# Patient Record
Sex: MALE | Race: WHITE | NOT HISPANIC OR LATINO | Employment: OTHER | URBAN - METROPOLITAN AREA
[De-identification: names, ages, dates, MRNs, and addresses within clinical notes are randomized per-mention and may not be internally consistent; named-entity substitution may affect disease eponyms.]

---

## 2018-05-29 ENCOUNTER — HOSPITAL ENCOUNTER (INPATIENT)
Facility: HOSPITAL | Age: 58
LOS: 16 days | Discharge: HOME HEALTH | End: 2018-06-15
Attending: EMERGENCY MEDICINE | Admitting: INTERNAL MEDICINE

## 2018-05-29 ENCOUNTER — APPOINTMENT (OUTPATIENT)
Dept: GENERAL RADIOLOGY | Facility: HOSPITAL | Age: 58
End: 2018-05-29

## 2018-05-29 DIAGNOSIS — J96.02 ACUTE RESPIRATORY FAILURE WITH HYPOXIA AND HYPERCAPNIA (HCC): ICD-10-CM

## 2018-05-29 DIAGNOSIS — J12.9 VIRAL PNEUMONIA: Primary | ICD-10-CM

## 2018-05-29 DIAGNOSIS — J96.01 ACUTE RESPIRATORY FAILURE WITH HYPOXIA AND HYPERCAPNIA (HCC): ICD-10-CM

## 2018-05-29 PROCEDURE — 85379 FIBRIN DEGRADATION QUANT: CPT | Performed by: INTERNAL MEDICINE

## 2018-05-29 PROCEDURE — 99285 EMERGENCY DEPT VISIT HI MDM: CPT

## 2018-05-29 PROCEDURE — 71045 X-RAY EXAM CHEST 1 VIEW: CPT

## 2018-05-29 PROCEDURE — 93005 ELECTROCARDIOGRAM TRACING: CPT | Performed by: EMERGENCY MEDICINE

## 2018-05-29 PROCEDURE — 84484 ASSAY OF TROPONIN QUANT: CPT | Performed by: EMERGENCY MEDICINE

## 2018-05-29 PROCEDURE — 83880 ASSAY OF NATRIURETIC PEPTIDE: CPT | Performed by: EMERGENCY MEDICINE

## 2018-05-29 PROCEDURE — 80053 COMPREHEN METABOLIC PANEL: CPT | Performed by: EMERGENCY MEDICINE

## 2018-05-29 PROCEDURE — 83605 ASSAY OF LACTIC ACID: CPT | Performed by: EMERGENCY MEDICINE

## 2018-05-29 PROCEDURE — 85025 COMPLETE CBC W/AUTO DIFF WBC: CPT | Performed by: EMERGENCY MEDICINE

## 2018-05-29 PROCEDURE — 93010 ELECTROCARDIOGRAM REPORT: CPT | Performed by: INTERNAL MEDICINE

## 2018-05-29 PROCEDURE — 87040 BLOOD CULTURE FOR BACTERIA: CPT | Performed by: EMERGENCY MEDICINE

## 2018-05-29 RX ORDER — CEFTRIAXONE SODIUM 1 G/50ML
1 INJECTION, SOLUTION INTRAVENOUS ONCE
Status: COMPLETED | OUTPATIENT
Start: 2018-05-29 | End: 2018-05-30

## 2018-05-29 RX ORDER — SODIUM CHLORIDE 0.9 % (FLUSH) 0.9 %
10 SYRINGE (ML) INJECTION AS NEEDED
Status: DISCONTINUED | OUTPATIENT
Start: 2018-05-29 | End: 2018-06-15 | Stop reason: HOSPADM

## 2018-05-30 ENCOUNTER — APPOINTMENT (OUTPATIENT)
Dept: CT IMAGING | Facility: HOSPITAL | Age: 58
End: 2018-05-30

## 2018-05-30 ENCOUNTER — APPOINTMENT (OUTPATIENT)
Dept: CARDIOLOGY | Facility: HOSPITAL | Age: 58
End: 2018-05-30
Attending: INTERNAL MEDICINE

## 2018-05-30 PROBLEM — J96.90 RESPIRATORY FAILURE (HCC): Status: ACTIVE | Noted: 2018-05-30

## 2018-05-30 LAB
ALBUMIN SERPL-MCNC: 3.2 G/DL (ref 3.5–5.2)
ALBUMIN SERPL-MCNC: 3.7 G/DL (ref 3.5–5.2)
ALBUMIN/GLOB SERPL: 1 G/DL
ALBUMIN/GLOB SERPL: 1.1 G/DL
ALP SERPL-CCNC: 50 U/L (ref 39–117)
ALP SERPL-CCNC: 58 U/L (ref 39–117)
ALT SERPL W P-5'-P-CCNC: 20 U/L (ref 1–41)
ALT SERPL W P-5'-P-CCNC: 26 U/L (ref 1–41)
ANION GAP SERPL CALCULATED.3IONS-SCNC: 8.6 MMOL/L
ANION GAP SERPL CALCULATED.3IONS-SCNC: 9.2 MMOL/L
ARTERIAL PATENCY WRIST A: POSITIVE
ARTERIAL PATENCY WRIST A: POSITIVE
AST SERPL-CCNC: 14 U/L (ref 1–40)
AST SERPL-CCNC: 19 U/L (ref 1–40)
ATMOSPHERIC PRESS: 744.6 MMHG
ATMOSPHERIC PRESS: 745.6 MMHG
B PERT DNA SPEC QL NAA+PROBE: NOT DETECTED
BASE EXCESS BLDA CALC-SCNC: 7.6 MMOL/L (ref 0–2)
BASE EXCESS BLDA CALC-SCNC: 8.2 MMOL/L (ref 0–2)
BASOPHILS # BLD AUTO: 0.01 10*3/MM3 (ref 0–0.2)
BASOPHILS # BLD AUTO: 0.02 10*3/MM3 (ref 0–0.2)
BASOPHILS NFR BLD AUTO: 0.2 % (ref 0–1.5)
BASOPHILS NFR BLD AUTO: 0.3 % (ref 0–1.5)
BDY SITE: ABNORMAL
BDY SITE: ABNORMAL
BH CV ECHO MEAS - ACS: 2.6 CM
BH CV ECHO MEAS - AO MEAN PG (FULL): 2 MMHG
BH CV ECHO MEAS - AO MEAN PG: 6 MMHG
BH CV ECHO MEAS - AO V2 MAX: 148 CM/SEC
BH CV ECHO MEAS - AO V2 MEAN: 113 CM/SEC
BH CV ECHO MEAS - AO V2 VTI: 25.4 CM
BH CV ECHO MEAS - AVA(I,A): 3.7 CM^2
BH CV ECHO MEAS - AVA(I,D): 3.7 CM^2
BH CV ECHO MEAS - BSA(HAYCOCK): 2.1 M^2
BH CV ECHO MEAS - BSA: 2.1 M^2
BH CV ECHO MEAS - BZI_BMI: 25 KILOGRAMS/M^2
BH CV ECHO MEAS - BZI_METRIC_HEIGHT: 182.9 CM
BH CV ECHO MEAS - BZI_METRIC_WEIGHT: 83.5 KG
BH CV ECHO MEAS - CONTRAST EF (2CH): 56.6 ML/M^2
BH CV ECHO MEAS - CONTRAST EF 4CH: 65.6 ML/M^2
BH CV ECHO MEAS - EDV(CUBED): 97.3 ML
BH CV ECHO MEAS - EDV(MOD-SP2): 122 ML
BH CV ECHO MEAS - EDV(MOD-SP4): 128 ML
BH CV ECHO MEAS - EDV(TEICH): 97.3 ML
BH CV ECHO MEAS - EF(CUBED): 74.9 %
BH CV ECHO MEAS - EF(MOD-BP): 66 %
BH CV ECHO MEAS - EF(MOD-SP2): 56.6 %
BH CV ECHO MEAS - EF(MOD-SP4): 65.6 %
BH CV ECHO MEAS - EF(TEICH): 66.9 %
BH CV ECHO MEAS - ESV(CUBED): 24.4 ML
BH CV ECHO MEAS - ESV(MOD-SP2): 53 ML
BH CV ECHO MEAS - ESV(MOD-SP4): 44 ML
BH CV ECHO MEAS - ESV(TEICH): 32.2 ML
BH CV ECHO MEAS - FS: 37 %
BH CV ECHO MEAS - IVS/LVPW: 1.1
BH CV ECHO MEAS - IVSD: 1.2 CM
BH CV ECHO MEAS - LA DIMENSION: 5 CM
BH CV ECHO MEAS - LAT PEAK E' VEL: 5 CM/SEC
BH CV ECHO MEAS - LV DIASTOLIC VOL/BSA (35-75): 62.3 ML/M^2
BH CV ECHO MEAS - LV MASS(C)D: 192.9 GRAMS
BH CV ECHO MEAS - LV MASS(C)DI: 93.8 GRAMS/M^2
BH CV ECHO MEAS - LV MEAN PG: 4 MMHG
BH CV ECHO MEAS - LV SYSTOLIC VOL/BSA (12-30): 21.4 ML/M^2
BH CV ECHO MEAS - LV V1 MAX: 132 CM/SEC
BH CV ECHO MEAS - LV V1 MEAN: 85.4 CM/SEC
BH CV ECHO MEAS - LV V1 VTI: 24.9 CM
BH CV ECHO MEAS - LVIDD: 4.6 CM
BH CV ECHO MEAS - LVIDS: 2.9 CM
BH CV ECHO MEAS - LVLD AP2: 8.3 CM
BH CV ECHO MEAS - LVLD AP4: 8 CM
BH CV ECHO MEAS - LVLS AP2: 8.3 CM
BH CV ECHO MEAS - LVLS AP4: 6.8 CM
BH CV ECHO MEAS - LVOT AREA (M): 3.8 CM^2
BH CV ECHO MEAS - LVOT AREA: 3.8 CM^2
BH CV ECHO MEAS - LVOT DIAM: 2.2 CM
BH CV ECHO MEAS - LVPWD: 1.1 CM
BH CV ECHO MEAS - MED PEAK E' VEL: 5 CM/SEC
BH CV ECHO MEAS - MV A DUR: 0.12 SEC
BH CV ECHO MEAS - MV A MAX VEL: 68.1 CM/SEC
BH CV ECHO MEAS - MV DEC SLOPE: 235 CM/SEC^2
BH CV ECHO MEAS - MV DEC TIME: 0.18 SEC
BH CV ECHO MEAS - MV E MAX VEL: 57.8 CM/SEC
BH CV ECHO MEAS - MV E/A: 0.85
BH CV ECHO MEAS - MV MEAN PG: 1 MMHG
BH CV ECHO MEAS - MV P1/2T MAX VEL: 61.1 CM/SEC
BH CV ECHO MEAS - MV P1/2T: 76.2 MSEC
BH CV ECHO MEAS - MV V2 MEAN: 40.8 CM/SEC
BH CV ECHO MEAS - MV V2 VTI: 22.3 CM
BH CV ECHO MEAS - MVA P1/2T LCG: 3.6 CM^2
BH CV ECHO MEAS - MVA(P1/2T): 2.9 CM^2
BH CV ECHO MEAS - MVA(VTI): 4.2 CM^2
BH CV ECHO MEAS - PA ACC SLOPE: 0 CM/SEC^2
BH CV ECHO MEAS - PA ACC TIME: 0.06 SEC
BH CV ECHO MEAS - PA MAX PG (FULL): 2.8 MMHG
BH CV ECHO MEAS - PA MAX PG: 4.3 MMHG
BH CV ECHO MEAS - PA PR(ACCEL): 50.7 MMHG
BH CV ECHO MEAS - PA V2 MAX: 104 CM/SEC
BH CV ECHO MEAS - PULM A REVS DUR: 0.15 SEC
BH CV ECHO MEAS - PULM A REVS VEL: 55 CM/SEC
BH CV ECHO MEAS - PULM DIAS VEL: 42.9 CM/SEC
BH CV ECHO MEAS - PULM S/D: 1
BH CV ECHO MEAS - PULM SYS VEL: 43.3 CM/SEC
BH CV ECHO MEAS - PVA(V,A): 2.5 CM^2
BH CV ECHO MEAS - PVA(V,D): 2.5 CM^2
BH CV ECHO MEAS - QP/QS: 0.55
BH CV ECHO MEAS - RAP SYSTOLE: 8 MMHG
BH CV ECHO MEAS - RV MAX PG: 1.6 MMHG
BH CV ECHO MEAS - RV MEAN PG: 1 MMHG
BH CV ECHO MEAS - RV V1 MAX: 62.4 CM/SEC
BH CV ECHO MEAS - RV V1 MEAN: 40.9 CM/SEC
BH CV ECHO MEAS - RV V1 VTI: 12.5 CM
BH CV ECHO MEAS - RVOT AREA: 4.2 CM^2
BH CV ECHO MEAS - RVOT DIAM: 2.3 CM
BH CV ECHO MEAS - RVSP: 15 MMHG
BH CV ECHO MEAS - SI(CUBED): 35.5 ML/M^2
BH CV ECHO MEAS - SI(LVOT): 46 ML/M^2
BH CV ECHO MEAS - SI(MOD-SP2): 33.6 ML/M^2
BH CV ECHO MEAS - SI(MOD-SP4): 40.9 ML/M^2
BH CV ECHO MEAS - SI(TEICH): 31.7 ML/M^2
BH CV ECHO MEAS - SV(CUBED): 72.9 ML
BH CV ECHO MEAS - SV(LVOT): 94.7 ML
BH CV ECHO MEAS - SV(MOD-SP2): 69 ML
BH CV ECHO MEAS - SV(MOD-SP4): 84 ML
BH CV ECHO MEAS - SV(RVOT): 51.9 ML
BH CV ECHO MEAS - SV(TEICH): 65.1 ML
BH CV ECHO MEAS - TAPSE (>1.6): 1.8 CM2
BH CV ECHO MEAS - TR MAX VEL: 132 CM/SEC
BH CV ECHO MEASUREMENTS AVERAGE E/E' RATIO: 11.56
BH CV XLRA - RV BASE: 3.6 CM
BH CV XLRA - TDI S': 14 CM/SEC
BILIRUB SERPL-MCNC: 0.2 MG/DL (ref 0.1–1.2)
BILIRUB SERPL-MCNC: 0.3 MG/DL (ref 0.1–1.2)
BUN BLD-MCNC: 16 MG/DL (ref 6–20)
BUN BLD-MCNC: 17 MG/DL (ref 6–20)
BUN/CREAT SERPL: 33.3 (ref 7–25)
BUN/CREAT SERPL: 36.2 (ref 7–25)
C PNEUM DNA NPH QL NAA+NON-PROBE: NOT DETECTED
CALCIUM SPEC-SCNC: 7.9 MG/DL (ref 8.6–10.5)
CALCIUM SPEC-SCNC: 8.7 MG/DL (ref 8.6–10.5)
CHLORIDE SERPL-SCNC: 101 MMOL/L (ref 98–107)
CHLORIDE SERPL-SCNC: 97 MMOL/L (ref 98–107)
CO2 SERPL-SCNC: 31.4 MMOL/L (ref 22–29)
CO2 SERPL-SCNC: 32.8 MMOL/L (ref 22–29)
CREAT BLD-MCNC: 0.47 MG/DL (ref 0.76–1.27)
CREAT BLD-MCNC: 0.48 MG/DL (ref 0.76–1.27)
D DIMER PPP FEU-MCNC: 0.31 MCGFEU/ML (ref 0–0.49)
D-LACTATE SERPL-SCNC: 1.8 MMOL/L (ref 0.5–2)
DEPRECATED RDW RBC AUTO: 53.3 FL (ref 37–54)
DEPRECATED RDW RBC AUTO: 53.9 FL (ref 37–54)
EOSINOPHIL # BLD AUTO: 0.03 10*3/MM3 (ref 0–0.7)
EOSINOPHIL # BLD AUTO: 0.04 10*3/MM3 (ref 0–0.7)
EOSINOPHIL NFR BLD AUTO: 0.5 % (ref 0.3–6.2)
EOSINOPHIL NFR BLD AUTO: 0.7 % (ref 0.3–6.2)
ERYTHROCYTE [DISTWIDTH] IN BLOOD BY AUTOMATED COUNT: 14.3 % (ref 11.5–14.5)
ERYTHROCYTE [DISTWIDTH] IN BLOOD BY AUTOMATED COUNT: 14.5 % (ref 11.5–14.5)
FLUAV H1 2009 PAND RNA NPH QL NAA+PROBE: NOT DETECTED
FLUAV H1 HA GENE NPH QL NAA+PROBE: NOT DETECTED
FLUAV H3 RNA NPH QL NAA+PROBE: NOT DETECTED
FLUAV SUBTYP SPEC NAA+PROBE: NOT DETECTED
FLUBV RNA ISLT QL NAA+PROBE: NOT DETECTED
FOLATE SERPL-MCNC: 19.2 NG/ML (ref 4.78–24.2)
GFR SERPL CREATININE-BSD FRML MDRD: >150 ML/MIN/1.73
GFR SERPL CREATININE-BSD FRML MDRD: >150 ML/MIN/1.73
GLOBULIN UR ELPH-MCNC: 3.2 GM/DL
GLOBULIN UR ELPH-MCNC: 3.4 GM/DL
GLUCOSE BLD-MCNC: 112 MG/DL (ref 65–99)
GLUCOSE BLD-MCNC: 196 MG/DL (ref 65–99)
GLUCOSE BLDC GLUCOMTR-MCNC: 106 MG/DL (ref 70–130)
GLUCOSE BLDC GLUCOMTR-MCNC: 109 MG/DL (ref 70–130)
GLUCOSE BLDC GLUCOMTR-MCNC: 110 MG/DL (ref 70–130)
GLUCOSE BLDC GLUCOMTR-MCNC: 117 MG/DL (ref 70–130)
GLUCOSE BLDC GLUCOMTR-MCNC: 136 MG/DL (ref 70–130)
HADV DNA SPEC NAA+PROBE: NOT DETECTED
HBA1C MFR BLD: 5.6 % (ref 4.8–5.6)
HCO3 BLDA-SCNC: 35.8 MMOL/L (ref 22–28)
HCO3 BLDA-SCNC: 36.5 MMOL/L (ref 22–28)
HCOV 229E RNA SPEC QL NAA+PROBE: NOT DETECTED
HCOV HKU1 RNA SPEC QL NAA+PROBE: NOT DETECTED
HCOV NL63 RNA SPEC QL NAA+PROBE: NOT DETECTED
HCOV OC43 RNA SPEC QL NAA+PROBE: NOT DETECTED
HCT VFR BLD AUTO: 41.2 % (ref 40.4–52.2)
HCT VFR BLD AUTO: 45.9 % (ref 40.4–52.2)
HGB BLD-MCNC: 12.7 G/DL (ref 13.7–17.6)
HGB BLD-MCNC: 14.8 G/DL (ref 13.7–17.6)
HMPV RNA NPH QL NAA+NON-PROBE: NOT DETECTED
HOLD SPECIMEN: NORMAL
HOLD SPECIMEN: NORMAL
HOROWITZ INDEX BLD+IHG-RTO: 100 %
HPIV1 RNA SPEC QL NAA+PROBE: NOT DETECTED
HPIV2 RNA SPEC QL NAA+PROBE: NOT DETECTED
HPIV3 RNA NPH QL NAA+PROBE: DETECTED
HPIV4 P GENE NPH QL NAA+PROBE: NOT DETECTED
IMM GRANULOCYTES # BLD: 0.02 10*3/MM3 (ref 0–0.03)
IMM GRANULOCYTES # BLD: 0.03 10*3/MM3 (ref 0–0.03)
IMM GRANULOCYTES NFR BLD: 0.3 % (ref 0–0.5)
IMM GRANULOCYTES NFR BLD: 0.5 % (ref 0–0.5)
LEFT ATRIUM VOLUME INDEX: 13 ML/M2
LEFT ATRIUM VOLUME: 26 CM3
LYMPHOCYTES # BLD AUTO: 0.93 10*3/MM3 (ref 0.9–4.8)
LYMPHOCYTES # BLD AUTO: 1.07 10*3/MM3 (ref 0.9–4.8)
LYMPHOCYTES NFR BLD AUTO: 15.6 % (ref 19.6–45.3)
LYMPHOCYTES NFR BLD AUTO: 17.8 % (ref 19.6–45.3)
M PNEUMO IGG SER IA-ACNC: NOT DETECTED
MAXIMAL PREDICTED HEART RATE: 161 BPM
MCH RBC QN AUTO: 31.4 PG (ref 27–32.7)
MCH RBC QN AUTO: 32.8 PG (ref 27–32.7)
MCHC RBC AUTO-ENTMCNC: 30.8 G/DL (ref 32.6–36.4)
MCHC RBC AUTO-ENTMCNC: 32.2 G/DL (ref 32.6–36.4)
MCV RBC AUTO: 101.8 FL (ref 79.8–96.2)
MCV RBC AUTO: 102 FL (ref 79.8–96.2)
MODALITY: ABNORMAL
MODALITY: ABNORMAL
MONOCYTES # BLD AUTO: 0.62 10*3/MM3 (ref 0.2–1.2)
MONOCYTES # BLD AUTO: 0.73 10*3/MM3 (ref 0.2–1.2)
MONOCYTES NFR BLD AUTO: 10.3 % (ref 5–12)
MONOCYTES NFR BLD AUTO: 12.2 % (ref 5–12)
NEUTROPHILS # BLD AUTO: 4.22 10*3/MM3 (ref 1.9–8.1)
NEUTROPHILS # BLD AUTO: 4.26 10*3/MM3 (ref 1.9–8.1)
NEUTROPHILS NFR BLD AUTO: 70.8 % (ref 42.7–76)
NEUTROPHILS NFR BLD AUTO: 70.8 % (ref 42.7–76)
NT-PROBNP SERPL-MCNC: 182.9 PG/ML (ref 0–900)
O2 A-A PPRESDIFF RESPIRATORY: 0.1 MMHG
PCO2 BLDA: 64.2 MM HG (ref 35–45)
PCO2 BLDA: 67.2 MM HG (ref 35–45)
PH BLDA: 7.34 PH UNITS (ref 7.35–7.45)
PH BLDA: 7.36 PH UNITS (ref 7.35–7.45)
PLATELET # BLD AUTO: 120 10*3/MM3 (ref 140–500)
PLATELET # BLD AUTO: 127 10*3/MM3 (ref 140–500)
PMV BLD AUTO: 9.6 FL (ref 6–12)
PMV BLD AUTO: 9.6 FL (ref 6–12)
PO2 BLDA: 67.8 MM HG (ref 80–100)
PO2 BLDA: 89.6 MM HG (ref 80–100)
POTASSIUM BLD-SCNC: 4.1 MMOL/L (ref 3.5–5.2)
POTASSIUM BLD-SCNC: 4.4 MMOL/L (ref 3.5–5.2)
PROCALCITONIN SERPL-MCNC: 0.06 NG/ML (ref 0.1–0.25)
PROT SERPL-MCNC: 6.4 G/DL (ref 6–8.5)
PROT SERPL-MCNC: 7.1 G/DL (ref 6–8.5)
RBC # BLD AUTO: 4.04 10*6/MM3 (ref 4.6–6)
RBC # BLD AUTO: 4.51 10*6/MM3 (ref 4.6–6)
RHINOVIRUS RNA SPEC NAA+PROBE: NOT DETECTED
RSV RNA NPH QL NAA+NON-PROBE: NOT DETECTED
SAO2 % BLDCOA: 91.2 % (ref 92–99)
SAO2 % BLDCOA: 96.1 % (ref 92–99)
SET MECH RESP RATE: 24
SODIUM BLD-SCNC: 139 MMOL/L (ref 136–145)
SODIUM BLD-SCNC: 141 MMOL/L (ref 136–145)
STRESS TARGET HR: 137 BPM
TOTAL RATE: 20 BREATHS/MINUTE
TROPONIN T SERPL-MCNC: <0.01 NG/ML (ref 0–0.03)
VIT B12 BLD-MCNC: 641 PG/ML (ref 211–946)
WBC NRBC COR # BLD: 5.96 10*3/MM3 (ref 4.5–10.7)
WBC NRBC COR # BLD: 6.02 10*3/MM3 (ref 4.5–10.7)
WHOLE BLOOD HOLD SPECIMEN: NORMAL
WHOLE BLOOD HOLD SPECIMEN: NORMAL

## 2018-05-30 PROCEDURE — 93306 TTE W/DOPPLER COMPLETE: CPT | Performed by: INTERNAL MEDICINE

## 2018-05-30 PROCEDURE — 94799 UNLISTED PULMONARY SVC/PX: CPT

## 2018-05-30 PROCEDURE — 87798 DETECT AGENT NOS DNA AMP: CPT | Performed by: EMERGENCY MEDICINE

## 2018-05-30 PROCEDURE — 82962 GLUCOSE BLOOD TEST: CPT

## 2018-05-30 PROCEDURE — 87486 CHLMYD PNEUM DNA AMP PROBE: CPT | Performed by: EMERGENCY MEDICINE

## 2018-05-30 PROCEDURE — 36600 WITHDRAWAL OF ARTERIAL BLOOD: CPT

## 2018-05-30 PROCEDURE — 70450 CT HEAD/BRAIN W/O DYE: CPT

## 2018-05-30 PROCEDURE — 25010000002 CEFTRIAXONE PER 250 MG: Performed by: EMERGENCY MEDICINE

## 2018-05-30 PROCEDURE — 87633 RESP VIRUS 12-25 TARGETS: CPT | Performed by: EMERGENCY MEDICINE

## 2018-05-30 PROCEDURE — 83036 HEMOGLOBIN GLYCOSYLATED A1C: CPT | Performed by: INTERNAL MEDICINE

## 2018-05-30 PROCEDURE — 85025 COMPLETE CBC W/AUTO DIFF WBC: CPT | Performed by: INTERNAL MEDICINE

## 2018-05-30 PROCEDURE — 82803 BLOOD GASES ANY COMBINATION: CPT

## 2018-05-30 PROCEDURE — 87081 CULTURE SCREEN ONLY: CPT | Performed by: INTERNAL MEDICINE

## 2018-05-30 PROCEDURE — 87581 M.PNEUMON DNA AMP PROBE: CPT | Performed by: EMERGENCY MEDICINE

## 2018-05-30 PROCEDURE — 25010000002 AZITHROMYCIN PER 500 MG: Performed by: EMERGENCY MEDICINE

## 2018-05-30 PROCEDURE — 80053 COMPREHEN METABOLIC PANEL: CPT | Performed by: INTERNAL MEDICINE

## 2018-05-30 PROCEDURE — 94668 MNPJ CHEST WALL SBSQ: CPT

## 2018-05-30 PROCEDURE — 25010000002 ENOXAPARIN PER 10 MG: Performed by: INTERNAL MEDICINE

## 2018-05-30 PROCEDURE — 94667 MNPJ CHEST WALL 1ST: CPT

## 2018-05-30 PROCEDURE — 94640 AIRWAY INHALATION TREATMENT: CPT

## 2018-05-30 PROCEDURE — 99222 1ST HOSP IP/OBS MODERATE 55: CPT | Performed by: PSYCHIATRY & NEUROLOGY

## 2018-05-30 PROCEDURE — 93010 ELECTROCARDIOGRAM REPORT: CPT | Performed by: INTERNAL MEDICINE

## 2018-05-30 PROCEDURE — 93306 TTE W/DOPPLER COMPLETE: CPT

## 2018-05-30 PROCEDURE — 87205 SMEAR GRAM STAIN: CPT | Performed by: INTERNAL MEDICINE

## 2018-05-30 PROCEDURE — 25010000002 FUROSEMIDE PER 20 MG: Performed by: INTERNAL MEDICINE

## 2018-05-30 PROCEDURE — 84145 PROCALCITONIN (PCT): CPT | Performed by: INTERNAL MEDICINE

## 2018-05-30 PROCEDURE — 87070 CULTURE OTHR SPECIMN AEROBIC: CPT | Performed by: INTERNAL MEDICINE

## 2018-05-30 PROCEDURE — 93005 ELECTROCARDIOGRAM TRACING: CPT | Performed by: INTERNAL MEDICINE

## 2018-05-30 PROCEDURE — 71250 CT THORAX DX C-: CPT

## 2018-05-30 PROCEDURE — 82607 VITAMIN B-12: CPT | Performed by: INTERNAL MEDICINE

## 2018-05-30 PROCEDURE — 82746 ASSAY OF FOLIC ACID SERUM: CPT | Performed by: INTERNAL MEDICINE

## 2018-05-30 RX ORDER — SODIUM CHLORIDE 0.9 % (FLUSH) 0.9 %
1-10 SYRINGE (ML) INJECTION AS NEEDED
Status: DISCONTINUED | OUTPATIENT
Start: 2018-05-30 | End: 2018-06-15 | Stop reason: HOSPADM

## 2018-05-30 RX ORDER — ACETAMINOPHEN 325 MG/1
650 TABLET ORAL EVERY 4 HOURS PRN
Status: DISCONTINUED | OUTPATIENT
Start: 2018-05-30 | End: 2018-06-15 | Stop reason: HOSPADM

## 2018-05-30 RX ORDER — CARBAMAZEPINE 100 MG/5ML
100 SUSPENSION ORAL 2 TIMES DAILY
Status: DISCONTINUED | OUTPATIENT
Start: 2018-05-30 | End: 2018-06-15 | Stop reason: HOSPADM

## 2018-05-30 RX ORDER — DIGOXIN 125 MCG
125 TABLET ORAL
COMMUNITY

## 2018-05-30 RX ORDER — POTASSIUM CHLORIDE 750 MG/1
40 CAPSULE, EXTENDED RELEASE ORAL AS NEEDED
Status: DISCONTINUED | OUTPATIENT
Start: 2018-05-30 | End: 2018-06-15 | Stop reason: HOSPADM

## 2018-05-30 RX ORDER — LANSOPRAZOLE 30 MG/1
30 CAPSULE, DELAYED RELEASE ORAL DAILY
COMMUNITY
End: 2018-06-15 | Stop reason: HOSPADM

## 2018-05-30 RX ORDER — CETIRIZINE HYDROCHLORIDE 10 MG/1
10 TABLET ORAL DAILY
COMMUNITY
End: 2018-06-15 | Stop reason: HOSPADM

## 2018-05-30 RX ORDER — SODIUM CHLORIDE FOR INHALATION 7 %
4 VIAL, NEBULIZER (ML) INHALATION
Status: DISCONTINUED | OUTPATIENT
Start: 2018-05-30 | End: 2018-06-01

## 2018-05-30 RX ORDER — ARIPIPRAZOLE 10 MG/1
20 TABLET ORAL DAILY
Status: DISCONTINUED | OUTPATIENT
Start: 2018-05-30 | End: 2018-05-30

## 2018-05-30 RX ORDER — BACLOFEN 10 MG/1
20 TABLET ORAL 3 TIMES DAILY
Status: DISCONTINUED | OUTPATIENT
Start: 2018-05-30 | End: 2018-05-30

## 2018-05-30 RX ORDER — POTASSIUM CHLORIDE 1.5 G/1.77G
40 POWDER, FOR SOLUTION ORAL AS NEEDED
Status: DISCONTINUED | OUTPATIENT
Start: 2018-05-30 | End: 2018-06-15 | Stop reason: HOSPADM

## 2018-05-30 RX ORDER — ACETAMINOPHEN 650 MG/1
650 SUPPOSITORY RECTAL EVERY 4 HOURS PRN
Status: DISCONTINUED | OUTPATIENT
Start: 2018-05-30 | End: 2018-06-15 | Stop reason: HOSPADM

## 2018-05-30 RX ORDER — DEXTROSE MONOHYDRATE 25 G/50ML
25 INJECTION, SOLUTION INTRAVENOUS
Status: DISCONTINUED | OUTPATIENT
Start: 2018-05-30 | End: 2018-06-15 | Stop reason: HOSPADM

## 2018-05-30 RX ORDER — PANTOPRAZOLE SODIUM 40 MG/1
40 TABLET, DELAYED RELEASE ORAL EVERY MORNING
Status: DISCONTINUED | OUTPATIENT
Start: 2018-05-30 | End: 2018-05-30

## 2018-05-30 RX ORDER — CARBAMAZEPINE 100 MG/5ML
SUSPENSION ORAL 2 TIMES DAILY
COMMUNITY

## 2018-05-30 RX ORDER — FUROSEMIDE 10 MG/ML
40 INJECTION INTRAMUSCULAR; INTRAVENOUS ONCE
Status: COMPLETED | OUTPATIENT
Start: 2018-05-30 | End: 2018-05-30

## 2018-05-30 RX ORDER — BUMETANIDE 0.5 MG/1
0.5 TABLET ORAL DAILY
COMMUNITY
End: 2018-06-15 | Stop reason: HOSPADM

## 2018-05-30 RX ORDER — LEVOTHYROXINE SODIUM 0.05 MG/1
50 TABLET ORAL DAILY
Status: DISCONTINUED | OUTPATIENT
Start: 2018-05-30 | End: 2018-06-15 | Stop reason: HOSPADM

## 2018-05-30 RX ORDER — CETIRIZINE HYDROCHLORIDE 10 MG/1
10 TABLET ORAL DAILY
Status: DISCONTINUED | OUTPATIENT
Start: 2018-05-30 | End: 2018-06-10

## 2018-05-30 RX ORDER — POTASSIUM CHLORIDE 7.45 MG/ML
10 INJECTION INTRAVENOUS
Status: DISCONTINUED | OUTPATIENT
Start: 2018-05-30 | End: 2018-06-15 | Stop reason: HOSPADM

## 2018-05-30 RX ORDER — IPRATROPIUM BROMIDE AND ALBUTEROL SULFATE 2.5; .5 MG/3ML; MG/3ML
3 SOLUTION RESPIRATORY (INHALATION)
Status: DISCONTINUED | OUTPATIENT
Start: 2018-05-30 | End: 2018-05-30

## 2018-05-30 RX ORDER — DIGOXIN 125 MCG
125 TABLET ORAL
Status: DISCONTINUED | OUTPATIENT
Start: 2018-05-30 | End: 2018-06-11

## 2018-05-30 RX ORDER — IPRATROPIUM BROMIDE 21 UG/1
2 SPRAY, METERED NASAL EVERY 12 HOURS
Status: DISCONTINUED | OUTPATIENT
Start: 2018-05-30 | End: 2018-05-30

## 2018-05-30 RX ORDER — ASPIRIN 325 MG
325 TABLET ORAL DAILY
Status: DISCONTINUED | OUTPATIENT
Start: 2018-05-30 | End: 2018-06-15 | Stop reason: HOSPADM

## 2018-05-30 RX ORDER — LEVETIRACETAM 750 MG/1
750 TABLET ORAL 2 TIMES DAILY
COMMUNITY

## 2018-05-30 RX ORDER — SENNA AND DOCUSATE SODIUM 50; 8.6 MG/1; MG/1
2 TABLET, FILM COATED ORAL NIGHTLY
Status: DISCONTINUED | OUTPATIENT
Start: 2018-05-30 | End: 2018-06-02

## 2018-05-30 RX ORDER — SCOLOPAMINE TRANSDERMAL SYSTEM 1 MG/1
1 PATCH, EXTENDED RELEASE TRANSDERMAL
Status: DISCONTINUED | OUTPATIENT
Start: 2018-05-30 | End: 2018-06-02

## 2018-05-30 RX ORDER — SODIUM CHLORIDE, SODIUM LACTATE, POTASSIUM CHLORIDE, CALCIUM CHLORIDE 600; 310; 30; 20 MG/100ML; MG/100ML; MG/100ML; MG/100ML
100 INJECTION, SOLUTION INTRAVENOUS CONTINUOUS
Status: DISCONTINUED | OUTPATIENT
Start: 2018-05-30 | End: 2018-05-30

## 2018-05-30 RX ORDER — FAMOTIDINE 10 MG/ML
20 INJECTION, SOLUTION INTRAVENOUS EVERY 12 HOURS SCHEDULED
Status: DISCONTINUED | OUTPATIENT
Start: 2018-05-30 | End: 2018-05-30 | Stop reason: ALTCHOICE

## 2018-05-30 RX ORDER — NICOTINE POLACRILEX 4 MG
15 LOZENGE BUCCAL
Status: DISCONTINUED | OUTPATIENT
Start: 2018-05-30 | End: 2018-06-15 | Stop reason: HOSPADM

## 2018-05-30 RX ORDER — ARFORMOTEROL TARTRATE 15 UG/2ML
15 SOLUTION RESPIRATORY (INHALATION)
Status: DISCONTINUED | OUTPATIENT
Start: 2018-05-30 | End: 2018-06-01

## 2018-05-30 RX ORDER — LEVOTHYROXINE SODIUM 0.05 MG/1
50 TABLET ORAL DAILY
COMMUNITY

## 2018-05-30 RX ORDER — BACLOFEN 10 MG/1
10 TABLET ORAL 3 TIMES DAILY
Status: DISCONTINUED | OUTPATIENT
Start: 2018-05-30 | End: 2018-06-10

## 2018-05-30 RX ORDER — IPRATROPIUM BROMIDE AND ALBUTEROL SULFATE 2.5; .5 MG/3ML; MG/3ML
3 SOLUTION RESPIRATORY (INHALATION)
Status: DISCONTINUED | OUTPATIENT
Start: 2018-05-30 | End: 2018-06-07

## 2018-05-30 RX ORDER — ARIPIPRAZOLE 20 MG/1
20 TABLET ORAL DAILY
COMMUNITY

## 2018-05-30 RX ORDER — BACLOFEN 20 MG/1
20 TABLET ORAL 3 TIMES DAILY
Status: ON HOLD | COMMUNITY
End: 2018-06-15

## 2018-05-30 RX ORDER — LANSOPRAZOLE
30 KIT EVERY MORNING
Status: DISCONTINUED | OUTPATIENT
Start: 2018-05-31 | End: 2018-06-10

## 2018-05-30 RX ORDER — ASPIRIN 325 MG
325 TABLET ORAL DAILY
COMMUNITY

## 2018-05-30 RX ADMIN — IPRATROPIUM BROMIDE 2 SPRAY: 21 SPRAY NASAL at 04:19

## 2018-05-30 RX ADMIN — LEVETIRACETAM 750 MG: 500 TABLET, FILM COATED ORAL at 07:36

## 2018-05-30 RX ADMIN — BACLOFEN 10 MG: 10 TABLET ORAL at 20:08

## 2018-05-30 RX ADMIN — SODIUM CHLORIDE, POTASSIUM CHLORIDE, SODIUM LACTATE AND CALCIUM CHLORIDE 100 ML/HR: 600; 310; 30; 20 INJECTION, SOLUTION INTRAVENOUS at 03:36

## 2018-05-30 RX ADMIN — ARFORMOTEROL TARTRATE 15 MCG: 15 SOLUTION RESPIRATORY (INHALATION) at 15:18

## 2018-05-30 RX ADMIN — CETIRIZINE HYDROCHLORIDE 10 MG: 10 TABLET, FILM COATED ORAL at 08:39

## 2018-05-30 RX ADMIN — ENOXAPARIN SODIUM 40 MG: 100 INJECTION SUBCUTANEOUS at 06:24

## 2018-05-30 RX ADMIN — BACLOFEN 20 MG: 10 TABLET ORAL at 08:39

## 2018-05-30 RX ADMIN — CEFTRIAXONE SODIUM 1 G: 1 INJECTION, SOLUTION INTRAVENOUS at 00:00

## 2018-05-30 RX ADMIN — SODIUM CHLORIDE 4 ML: 7 NEBU SOLN,3 % NEBU at 15:18

## 2018-05-30 RX ADMIN — FAMOTIDINE 20 MG: 10 INJECTION INTRAVENOUS at 08:41

## 2018-05-30 RX ADMIN — ARIPIPRAZOLE 20 MG: 10 TABLET ORAL at 08:39

## 2018-05-30 RX ADMIN — IPRATROPIUM BROMIDE AND ALBUTEROL SULFATE 3 ML: .5; 3 SOLUTION RESPIRATORY (INHALATION) at 06:48

## 2018-05-30 RX ADMIN — DILTIAZEM HYDROCHLORIDE 30 MG: 30 TABLET, FILM COATED ORAL at 20:07

## 2018-05-30 RX ADMIN — LEVOTHYROXINE SODIUM 50 MCG: 50 TABLET ORAL at 08:39

## 2018-05-30 RX ADMIN — DILTIAZEM HYDROCHLORIDE 30 MG: 30 TABLET, FILM COATED ORAL at 08:39

## 2018-05-30 RX ADMIN — DIGOXIN 125 MCG: 0.12 TABLET ORAL at 13:49

## 2018-05-30 RX ADMIN — CARBAMAZEPINE 100 MG: 100 SUSPENSION ORAL at 20:08

## 2018-05-30 RX ADMIN — SCOPOLAMINE 1 PATCH: 1 PATCH, EXTENDED RELEASE TRANSDERMAL at 17:16

## 2018-05-30 RX ADMIN — FUROSEMIDE 40 MG: 10 INJECTION, SOLUTION INTRAMUSCULAR; INTRAVENOUS at 16:09

## 2018-05-30 RX ADMIN — IPRATROPIUM BROMIDE AND ALBUTEROL SULFATE 3 ML: .5; 3 SOLUTION RESPIRATORY (INHALATION) at 20:21

## 2018-05-30 RX ADMIN — BACLOFEN 10 MG: 10 TABLET ORAL at 16:07

## 2018-05-30 RX ADMIN — ASPIRIN 325 MG: 325 TABLET ORAL at 08:39

## 2018-05-30 RX ADMIN — AZITHROMYCIN MONOHYDRATE 500 MG: 500 INJECTION, POWDER, LYOPHILIZED, FOR SOLUTION INTRAVENOUS at 00:31

## 2018-05-30 RX ADMIN — DOCUSATE SODIUM -SENNOSIDES 2 TABLET: 50; 8.6 TABLET, COATED ORAL at 07:36

## 2018-05-30 RX ADMIN — LEVETIRACETAM 750 MG: 500 TABLET, FILM COATED ORAL at 17:17

## 2018-05-30 RX ADMIN — CARBAMAZEPINE 100 MG: 100 SUSPENSION ORAL at 08:39

## 2018-05-30 RX ADMIN — SODIUM CHLORIDE 1000 ML: 9 INJECTION, SOLUTION INTRAVENOUS at 00:00

## 2018-05-30 RX ADMIN — DOCUSATE SODIUM -SENNOSIDES 2 TABLET: 50; 8.6 TABLET, COATED ORAL at 20:08

## 2018-05-30 RX ADMIN — DILTIAZEM HYDROCHLORIDE 30 MG: 30 TABLET, FILM COATED ORAL at 16:08

## 2018-05-31 PROBLEM — J96.01 ACUTE RESPIRATORY FAILURE WITH HYPOXIA AND HYPERCAPNIA (HCC): Status: ACTIVE | Noted: 2018-05-29

## 2018-05-31 PROBLEM — J96.02 ACUTE RESPIRATORY FAILURE WITH HYPOXIA AND HYPERCAPNIA (HCC): Status: ACTIVE | Noted: 2018-05-29

## 2018-05-31 LAB
ANION GAP SERPL CALCULATED.3IONS-SCNC: 9.3 MMOL/L
ARTERIAL PATENCY WRIST A: ABNORMAL
ATMOSPHERIC PRESS: 748.1 MMHG
BASE EXCESS BLDA CALC-SCNC: 8.3 MMOL/L (ref 0–2)
BASOPHILS # BLD AUTO: 0.02 10*3/MM3 (ref 0–0.2)
BASOPHILS NFR BLD AUTO: 0.3 % (ref 0–1.5)
BDY SITE: ABNORMAL
BUN BLD-MCNC: 19 MG/DL (ref 6–20)
BUN/CREAT SERPL: 46.3 (ref 7–25)
CALCIUM SPEC-SCNC: 7.9 MG/DL (ref 8.6–10.5)
CHLORIDE SERPL-SCNC: 97 MMOL/L (ref 98–107)
CO2 SERPL-SCNC: 34.7 MMOL/L (ref 22–29)
CREAT BLD-MCNC: 0.41 MG/DL (ref 0.76–1.27)
DEPRECATED RDW RBC AUTO: 49.4 FL (ref 37–54)
EOSINOPHIL # BLD AUTO: 0.01 10*3/MM3 (ref 0–0.7)
EOSINOPHIL NFR BLD AUTO: 0.2 % (ref 0.3–6.2)
ERYTHROCYTE [DISTWIDTH] IN BLOOD BY AUTOMATED COUNT: 14.3 % (ref 11.5–14.5)
GFR SERPL CREATININE-BSD FRML MDRD: >150 ML/MIN/1.73
GLUCOSE BLD-MCNC: 149 MG/DL (ref 65–99)
GLUCOSE BLDC GLUCOMTR-MCNC: 102 MG/DL (ref 70–130)
GLUCOSE BLDC GLUCOMTR-MCNC: 146 MG/DL (ref 70–130)
GLUCOSE BLDC GLUCOMTR-MCNC: 191 MG/DL (ref 70–130)
HCO3 BLDA-SCNC: 36.3 MMOL/L (ref 22–28)
HCT VFR BLD AUTO: 40.3 % (ref 40.4–52.2)
HGB BLD-MCNC: 13.6 G/DL (ref 13.7–17.6)
HOROWITZ INDEX BLD+IHG-RTO: 100 %
IMM GRANULOCYTES # BLD: 0.02 10*3/MM3 (ref 0–0.03)
IMM GRANULOCYTES NFR BLD: 0.3 % (ref 0–0.5)
LYMPHOCYTES # BLD AUTO: 0.48 10*3/MM3 (ref 0.9–4.8)
LYMPHOCYTES NFR BLD AUTO: 7.6 % (ref 19.6–45.3)
MCH RBC QN AUTO: 32.1 PG (ref 27–32.7)
MCHC RBC AUTO-ENTMCNC: 33.7 G/DL (ref 32.6–36.4)
MCV RBC AUTO: 95 FL (ref 79.8–96.2)
MODALITY: ABNORMAL
MONOCYTES # BLD AUTO: 0.44 10*3/MM3 (ref 0.2–1.2)
MONOCYTES NFR BLD AUTO: 7 % (ref 5–12)
NEUTROPHILS # BLD AUTO: 5.38 10*3/MM3 (ref 1.9–8.1)
NEUTROPHILS NFR BLD AUTO: 84.9 % (ref 42.7–76)
O2 A-A PPRESDIFF RESPIRATORY: 0.1 MMHG
PCO2 BLDA: 64.3 MM HG (ref 35–45)
PH BLDA: 7.36 PH UNITS (ref 7.35–7.45)
PLATELET # BLD AUTO: 115 10*3/MM3 (ref 140–500)
PMV BLD AUTO: 8.9 FL (ref 6–12)
PO2 BLDA: 55.7 MM HG (ref 80–100)
POTASSIUM BLD-SCNC: 3.9 MMOL/L (ref 3.5–5.2)
RBC # BLD AUTO: 4.24 10*6/MM3 (ref 4.6–6)
SAO2 % BLDCOA: 86 % (ref 92–99)
SET MECH RESP RATE: 29
SODIUM BLD-SCNC: 141 MMOL/L (ref 136–145)
WBC NRBC COR # BLD: 6.33 10*3/MM3 (ref 4.5–10.7)

## 2018-05-31 PROCEDURE — 85025 COMPLETE CBC W/AUTO DIFF WBC: CPT | Performed by: INTERNAL MEDICINE

## 2018-05-31 PROCEDURE — 82803 BLOOD GASES ANY COMBINATION: CPT

## 2018-05-31 PROCEDURE — 94799 UNLISTED PULMONARY SVC/PX: CPT

## 2018-05-31 PROCEDURE — 80048 BASIC METABOLIC PNL TOTAL CA: CPT | Performed by: INTERNAL MEDICINE

## 2018-05-31 PROCEDURE — 63710000001 INSULIN ASPART PER 5 UNITS: Performed by: INTERNAL MEDICINE

## 2018-05-31 PROCEDURE — 99232 SBSQ HOSP IP/OBS MODERATE 35: CPT | Performed by: NURSE PRACTITIONER

## 2018-05-31 PROCEDURE — 94660 CPAP INITIATION&MGMT: CPT

## 2018-05-31 PROCEDURE — 25010000002 ENOXAPARIN PER 10 MG: Performed by: INTERNAL MEDICINE

## 2018-05-31 PROCEDURE — 94668 MNPJ CHEST WALL SBSQ: CPT

## 2018-05-31 PROCEDURE — 36600 WITHDRAWAL OF ARTERIAL BLOOD: CPT

## 2018-05-31 PROCEDURE — 82962 GLUCOSE BLOOD TEST: CPT

## 2018-05-31 PROCEDURE — 25010000002 FUROSEMIDE PER 20 MG: Performed by: INTERNAL MEDICINE

## 2018-05-31 RX ORDER — FUROSEMIDE 10 MG/ML
40 INJECTION INTRAMUSCULAR; INTRAVENOUS ONCE
Status: COMPLETED | OUTPATIENT
Start: 2018-05-31 | End: 2018-05-31

## 2018-05-31 RX ADMIN — INSULIN ASPART 2 UNITS: 100 INJECTION, SOLUTION INTRAVENOUS; SUBCUTANEOUS at 06:00

## 2018-05-31 RX ADMIN — DILTIAZEM HYDROCHLORIDE 30 MG: 30 TABLET, FILM COATED ORAL at 10:00

## 2018-05-31 RX ADMIN — CETIRIZINE HYDROCHLORIDE 10 MG: 10 TABLET, FILM COATED ORAL at 10:00

## 2018-05-31 RX ADMIN — BACLOFEN 10 MG: 10 TABLET ORAL at 20:07

## 2018-05-31 RX ADMIN — ASPIRIN 325 MG: 325 TABLET ORAL at 10:00

## 2018-05-31 RX ADMIN — LEVOTHYROXINE SODIUM 50 MCG: 50 TABLET ORAL at 10:00

## 2018-05-31 RX ADMIN — DILTIAZEM HYDROCHLORIDE 30 MG: 30 TABLET, FILM COATED ORAL at 20:07

## 2018-05-31 RX ADMIN — DIGOXIN 125 MCG: 0.12 TABLET ORAL at 12:18

## 2018-05-31 RX ADMIN — LANSOPRAZOLE 30 MG: KIT at 06:11

## 2018-05-31 RX ADMIN — ARIPIPRAZOLE 15 MG: 5 TABLET ORAL at 10:00

## 2018-05-31 RX ADMIN — CARBAMAZEPINE 100 MG: 100 SUSPENSION ORAL at 20:07

## 2018-05-31 RX ADMIN — IPRATROPIUM BROMIDE AND ALBUTEROL SULFATE 3 ML: .5; 3 SOLUTION RESPIRATORY (INHALATION) at 02:33

## 2018-05-31 RX ADMIN — ENOXAPARIN SODIUM 40 MG: 100 INJECTION SUBCUTANEOUS at 06:11

## 2018-05-31 RX ADMIN — SODIUM CHLORIDE 4 ML: 7 NEBU SOLN,3 % NEBU at 06:36

## 2018-05-31 RX ADMIN — BACLOFEN 10 MG: 10 TABLET ORAL at 10:00

## 2018-05-31 RX ADMIN — ARFORMOTEROL TARTRATE 15 MCG: 15 SOLUTION RESPIRATORY (INHALATION) at 19:42

## 2018-05-31 RX ADMIN — LEVETIRACETAM 750 MG: 500 TABLET, FILM COATED ORAL at 06:12

## 2018-05-31 RX ADMIN — ARFORMOTEROL TARTRATE 15 MCG: 15 SOLUTION RESPIRATORY (INHALATION) at 06:36

## 2018-05-31 RX ADMIN — DILTIAZEM HYDROCHLORIDE 30 MG: 30 TABLET, FILM COATED ORAL at 16:28

## 2018-05-31 RX ADMIN — FUROSEMIDE 40 MG: 10 INJECTION, SOLUTION INTRAMUSCULAR; INTRAVENOUS at 15:43

## 2018-05-31 RX ADMIN — SODIUM CHLORIDE 4 ML: 7 NEBU SOLN,3 % NEBU at 19:43

## 2018-05-31 RX ADMIN — CARBAMAZEPINE 100 MG: 100 SUSPENSION ORAL at 10:00

## 2018-05-31 RX ADMIN — LEVETIRACETAM 750 MG: 500 TABLET, FILM COATED ORAL at 18:20

## 2018-05-31 RX ADMIN — IPRATROPIUM BROMIDE AND ALBUTEROL SULFATE 3 ML: .5; 3 SOLUTION RESPIRATORY (INHALATION) at 13:40

## 2018-05-31 RX ADMIN — BACLOFEN 10 MG: 10 TABLET ORAL at 16:28

## 2018-05-31 RX ADMIN — ACETAMINOPHEN 650 MG: 325 TABLET ORAL at 10:13

## 2018-06-01 LAB
ANION GAP SERPL CALCULATED.3IONS-SCNC: 7.1 MMOL/L
APPEARANCE FLD: ABNORMAL
ARTERIAL PATENCY WRIST A: ABNORMAL
ARTERIAL PATENCY WRIST A: POSITIVE
ATMOSPHERIC PRESS: 748.2 MMHG
ATMOSPHERIC PRESS: 748.8 MMHG
BACTERIA SPEC RESP CULT: NORMAL
BASE EXCESS BLDA CALC-SCNC: 11.5 MMOL/L (ref 0–2)
BASE EXCESS BLDA CALC-SCNC: 12.1 MMOL/L (ref 0–2)
BASOPHILS # BLD AUTO: 0.03 10*3/MM3 (ref 0–0.2)
BASOPHILS NFR BLD AUTO: 0.4 % (ref 0–1.5)
BDY SITE: ABNORMAL
BDY SITE: ABNORMAL
BUN BLD-MCNC: 21 MG/DL (ref 6–20)
BUN/CREAT SERPL: 46.7 (ref 7–25)
CALCIUM SPEC-SCNC: 8.1 MG/DL (ref 8.6–10.5)
CHLORIDE SERPL-SCNC: 95 MMOL/L (ref 98–107)
CO2 SERPL-SCNC: 36.9 MMOL/L (ref 22–29)
COLOR FLD: ABNORMAL
CREAT BLD-MCNC: 0.45 MG/DL (ref 0.76–1.27)
DEPRECATED RDW RBC AUTO: 53.9 FL (ref 37–54)
EOSINOPHIL # BLD AUTO: 0.01 10*3/MM3 (ref 0–0.7)
EOSINOPHIL NFR BLD AUTO: 0.1 % (ref 0.3–6.2)
ERYTHROCYTE [DISTWIDTH] IN BLOOD BY AUTOMATED COUNT: 14.5 % (ref 11.5–14.5)
GFR SERPL CREATININE-BSD FRML MDRD: >150 ML/MIN/1.73
GIE STN SPEC: NORMAL
GLUCOSE BLD-MCNC: 120 MG/DL (ref 65–99)
GLUCOSE BLDC GLUCOMTR-MCNC: 103 MG/DL (ref 70–130)
GLUCOSE BLDC GLUCOMTR-MCNC: 106 MG/DL (ref 70–130)
GLUCOSE BLDC GLUCOMTR-MCNC: 110 MG/DL (ref 70–130)
GLUCOSE BLDC GLUCOMTR-MCNC: 125 MG/DL (ref 70–130)
GLUCOSE BLDC GLUCOMTR-MCNC: 133 MG/DL (ref 70–130)
GLUCOSE BLDC GLUCOMTR-MCNC: 144 MG/DL (ref 70–130)
GLUCOSE BLDC GLUCOMTR-MCNC: 154 MG/DL (ref 70–130)
GRAM STN SPEC: NORMAL
HCO3 BLDA-SCNC: 38.2 MMOL/L (ref 22–28)
HCO3 BLDA-SCNC: 43.6 MMOL/L (ref 22–28)
HCT VFR BLD AUTO: 43.6 % (ref 40.4–52.2)
HGB BLD-MCNC: 13.7 G/DL (ref 13.7–17.6)
HOROWITZ INDEX BLD+IHG-RTO: 50 %
HOROWITZ INDEX BLD+IHG-RTO: 50 %
IMM GRANULOCYTES # BLD: 0.02 10*3/MM3 (ref 0–0.03)
IMM GRANULOCYTES NFR BLD: 0.2 % (ref 0–0.5)
LYMPHOCYTES # BLD AUTO: 0.93 10*3/MM3 (ref 0.9–4.8)
LYMPHOCYTES NFR BLD AUTO: 11.6 % (ref 19.6–45.3)
LYMPHOCYTES NFR FLD MANUAL: 11 %
MCH RBC QN AUTO: 31.9 PG (ref 27–32.7)
MCHC RBC AUTO-ENTMCNC: 31.4 G/DL (ref 32.6–36.4)
MCV RBC AUTO: 101.6 FL (ref 79.8–96.2)
MODALITY: ABNORMAL
MODALITY: ABNORMAL
MONOCYTES # BLD AUTO: 0.71 10*3/MM3 (ref 0.2–1.2)
MONOCYTES NFR BLD AUTO: 8.9 % (ref 5–12)
MONOCYTES NFR FLD: 6 %
MONOS+MACROS NFR FLD: 1 %
MRSA SPEC QL CULT: NORMAL
NEUTROPHILS # BLD AUTO: 6.32 10*3/MM3 (ref 1.9–8.1)
NEUTROPHILS NFR BLD AUTO: 78.8 % (ref 42.7–76)
NEUTROPHILS NFR FLD MANUAL: 82 %
O2 A-A PPRESDIFF RESPIRATORY: 0.2 MMHG
O2 A-A PPRESDIFF RESPIRATORY: 0.3 MMHG
PCO2 BLDA: 57 MM HG (ref 35–45)
PCO2 BLDA: 92.6 MM HG (ref 35–45)
PEEP RESPIRATORY: 5 CM[H2O]
PEEP RESPIRATORY: 5 CM[H2O]
PH BLDA: 7.28 PH UNITS (ref 7.35–7.45)
PH BLDA: 7.43 PH UNITS (ref 7.35–7.45)
PLATELET # BLD AUTO: 111 10*3/MM3 (ref 140–500)
PMV BLD AUTO: 9.4 FL (ref 6–12)
PO2 BLDA: 65.1 MM HG (ref 80–100)
PO2 BLDA: 68.8 MM HG (ref 80–100)
POTASSIUM BLD-SCNC: 3.8 MMOL/L (ref 3.5–5.2)
RBC # BLD AUTO: 4.29 10*6/MM3 (ref 4.6–6)
RBC # FLD AUTO: ABNORMAL /MM3
SAO2 % BLDCOA: 89.3 % (ref 92–99)
SAO2 % BLDCOA: 92.4 % (ref 92–99)
SET MECH RESP RATE: 16
SET MECH RESP RATE: 24
SODIUM BLD-SCNC: 139 MMOL/L (ref 136–145)
TOTAL RATE: 16 BREATHS/MINUTE
TOTAL RATE: 24 BREATHS/MINUTE
VENTILATOR MODE: ABNORMAL
VENTILATOR MODE: AC
VT ON VENT VENT: 345 ML
VT ON VENT VENT: 450 ML
WBC # FLD: 920 /MM3
WBC NRBC COR # BLD: 8.02 10*3/MM3 (ref 4.5–10.7)

## 2018-06-01 PROCEDURE — 25010000002 FENTANYL CITRATE (PF) 100 MCG/2ML SOLUTION

## 2018-06-01 PROCEDURE — 87116 MYCOBACTERIA CULTURE: CPT | Performed by: INTERNAL MEDICINE

## 2018-06-01 PROCEDURE — 87102 FUNGUS ISOLATION CULTURE: CPT | Performed by: INTERNAL MEDICINE

## 2018-06-01 PROCEDURE — 94668 MNPJ CHEST WALL SBSQ: CPT

## 2018-06-01 PROCEDURE — 87071 CULTURE AEROBIC QUANT OTHER: CPT | Performed by: INTERNAL MEDICINE

## 2018-06-01 PROCEDURE — 88312 SPECIAL STAINS GROUP 1: CPT | Performed by: INTERNAL MEDICINE

## 2018-06-01 PROCEDURE — 89051 BODY FLUID CELL COUNT: CPT | Performed by: INTERNAL MEDICINE

## 2018-06-01 PROCEDURE — 82962 GLUCOSE BLOOD TEST: CPT

## 2018-06-01 PROCEDURE — 94799 UNLISTED PULMONARY SVC/PX: CPT

## 2018-06-01 PROCEDURE — 63710000001 INSULIN ASPART PER 5 UNITS: Performed by: INTERNAL MEDICINE

## 2018-06-01 PROCEDURE — 25010000002 PROPOFOL 10 MG/ML EMULSION

## 2018-06-01 PROCEDURE — 0B21XFZ CHANGE TRACHEOSTOMY DEVICE IN TRACHEA, EXTERNAL APPROACH: ICD-10-PCS | Performed by: INTERNAL MEDICINE

## 2018-06-01 PROCEDURE — C1769 GUIDE WIRE: HCPCS

## 2018-06-01 PROCEDURE — 85025 COMPLETE CBC W/AUTO DIFF WBC: CPT | Performed by: INTERNAL MEDICINE

## 2018-06-01 PROCEDURE — 88305 TISSUE EXAM BY PATHOLOGIST: CPT | Performed by: INTERNAL MEDICINE

## 2018-06-01 PROCEDURE — 88112 CYTOPATH CELL ENHANCE TECH: CPT | Performed by: INTERNAL MEDICINE

## 2018-06-01 PROCEDURE — 87206 SMEAR FLUORESCENT/ACID STAI: CPT | Performed by: INTERNAL MEDICINE

## 2018-06-01 PROCEDURE — 36600 WITHDRAWAL OF ARTERIAL BLOOD: CPT

## 2018-06-01 PROCEDURE — 0B9J8ZX DRAINAGE OF LEFT LOWER LUNG LOBE, VIA NATURAL OR ARTIFICIAL OPENING ENDOSCOPIC, DIAGNOSTIC: ICD-10-PCS | Performed by: INTERNAL MEDICINE

## 2018-06-01 PROCEDURE — 94002 VENT MGMT INPAT INIT DAY: CPT

## 2018-06-01 PROCEDURE — 82803 BLOOD GASES ANY COMBINATION: CPT

## 2018-06-01 PROCEDURE — 87186 SC STD MICRODIL/AGAR DIL: CPT | Performed by: INTERNAL MEDICINE

## 2018-06-01 PROCEDURE — 25010000002 PROPOFOL 1000 MG/ML EMULSION: Performed by: INTERNAL MEDICINE

## 2018-06-01 PROCEDURE — 94640 AIRWAY INHALATION TREATMENT: CPT

## 2018-06-01 PROCEDURE — 80048 BASIC METABOLIC PNL TOTAL CA: CPT | Performed by: INTERNAL MEDICINE

## 2018-06-01 PROCEDURE — 25010000002 FENTANYL CITRATE (PF) 100 MCG/2ML SOLUTION: Performed by: INTERNAL MEDICINE

## 2018-06-01 PROCEDURE — 87205 SMEAR GRAM STAIN: CPT | Performed by: INTERNAL MEDICINE

## 2018-06-01 PROCEDURE — 0B9B8ZZ DRAINAGE OF LEFT LOWER LOBE BRONCHUS, VIA NATURAL OR ARTIFICIAL OPENING ENDOSCOPIC: ICD-10-PCS | Performed by: INTERNAL MEDICINE

## 2018-06-01 RX ORDER — FENTANYL CITRATE 50 UG/ML
INJECTION, SOLUTION INTRAMUSCULAR; INTRAVENOUS
Status: COMPLETED
Start: 2018-06-01 | End: 2018-06-01

## 2018-06-01 RX ORDER — VECURONIUM BROMIDE 1 MG/ML
INJECTION, POWDER, LYOPHILIZED, FOR SOLUTION INTRAVENOUS
Status: COMPLETED
Start: 2018-06-01 | End: 2018-06-01

## 2018-06-01 RX ORDER — FENTANYL CITRATE 50 UG/ML
12.5 INJECTION, SOLUTION INTRAMUSCULAR; INTRAVENOUS
Status: DISCONTINUED | OUTPATIENT
Start: 2018-06-01 | End: 2018-06-11

## 2018-06-01 RX ORDER — ALBUTEROL SULFATE 90 UG/1
2 AEROSOL, METERED RESPIRATORY (INHALATION) EVERY 4 HOURS PRN
Status: DISCONTINUED | OUTPATIENT
Start: 2018-06-01 | End: 2018-06-01

## 2018-06-01 RX ORDER — ALBUTEROL SULFATE 90 UG/1
6 AEROSOL, METERED RESPIRATORY (INHALATION)
Status: DISCONTINUED | OUTPATIENT
Start: 2018-06-01 | End: 2018-06-07

## 2018-06-01 RX ORDER — PROPOFOL 10 MG/ML
VIAL (ML) INTRAVENOUS
Status: COMPLETED
Start: 2018-06-01 | End: 2018-06-01

## 2018-06-01 RX ADMIN — VECURONIUM BROMIDE 10 MG: 1 INJECTION, POWDER, LYOPHILIZED, FOR SOLUTION INTRAVENOUS at 09:15

## 2018-06-01 RX ADMIN — DILTIAZEM HYDROCHLORIDE 30 MG: 30 TABLET, FILM COATED ORAL at 16:27

## 2018-06-01 RX ADMIN — LANSOPRAZOLE 30 MG: KIT at 06:14

## 2018-06-01 RX ADMIN — ALBUTEROL SULFATE 6 PUFF: 90 AEROSOL, METERED RESPIRATORY (INHALATION) at 20:15

## 2018-06-01 RX ADMIN — PROPOFOL 1000 MG: 10 INJECTION, EMULSION INTRAVENOUS at 09:15

## 2018-06-01 RX ADMIN — BACLOFEN 10 MG: 10 TABLET ORAL at 20:08

## 2018-06-01 RX ADMIN — FENTANYL CITRATE 100 MCG: 50 INJECTION INTRAMUSCULAR; INTRAVENOUS at 09:15

## 2018-06-01 RX ADMIN — ARFORMOTEROL TARTRATE 15 MCG: 15 SOLUTION RESPIRATORY (INHALATION) at 07:10

## 2018-06-01 RX ADMIN — ALBUTEROL SULFATE 6 PUFF: 90 AEROSOL, METERED RESPIRATORY (INHALATION) at 15:09

## 2018-06-01 RX ADMIN — ALBUTEROL SULFATE 6 PUFF: 90 AEROSOL, METERED RESPIRATORY (INHALATION) at 23:26

## 2018-06-01 RX ADMIN — DOCUSATE SODIUM -SENNOSIDES 2 TABLET: 50; 8.6 TABLET, COATED ORAL at 20:08

## 2018-06-01 RX ADMIN — INSULIN ASPART 2 UNITS: 100 INJECTION, SOLUTION INTRAVENOUS; SUBCUTANEOUS at 18:00

## 2018-06-01 RX ADMIN — FENTANYL CITRATE 12.5 MCG: 50 INJECTION, SOLUTION INTRAMUSCULAR; INTRAVENOUS at 11:29

## 2018-06-01 RX ADMIN — LEVETIRACETAM 750 MG: 500 TABLET, FILM COATED ORAL at 06:14

## 2018-06-01 RX ADMIN — CETIRIZINE HYDROCHLORIDE 10 MG: 10 TABLET, FILM COATED ORAL at 11:48

## 2018-06-01 RX ADMIN — LEVETIRACETAM 750 MG: 500 TABLET, FILM COATED ORAL at 18:00

## 2018-06-01 RX ADMIN — ALBUTEROL SULFATE 6 PUFF: 90 AEROSOL, METERED RESPIRATORY (INHALATION) at 11:41

## 2018-06-01 RX ADMIN — CARBAMAZEPINE 100 MG: 100 SUSPENSION ORAL at 20:08

## 2018-06-01 RX ADMIN — SODIUM CHLORIDE 4 ML: 7 NEBU SOLN,3 % NEBU at 07:11

## 2018-06-01 RX ADMIN — ARIPIPRAZOLE 15 MG: 5 TABLET ORAL at 11:48

## 2018-06-01 RX ADMIN — LEVOTHYROXINE SODIUM 50 MCG: 50 TABLET ORAL at 11:49

## 2018-06-01 RX ADMIN — PROPOFOL 10 MCG/KG/MIN: 10 INJECTION, EMULSION INTRAVENOUS at 23:23

## 2018-06-01 RX ADMIN — ASPIRIN 325 MG: 325 TABLET ORAL at 11:48

## 2018-06-01 RX ADMIN — BACLOFEN 10 MG: 10 TABLET ORAL at 16:27

## 2018-06-01 NOTE — PROGRESS NOTES
Juan Blankenship MD                          [phone]      Patient ID:    Name:  Brayden Lynn    MRN:  [id number]    1960   57 y.o.  male            Patient Care Team:  Guillermo Do MD as PCP - General (Internal Medicine)      CC/Reason for visit:     Subjective: Pt seen and examined this AM. No acute overnight events noted. Doing better. O2 needs stable. Hemoptysis this AM. Persistent secretions +.      ROS:   Unable to obtain     Objective     Vital Signs past 24hrs    BP range: BP: ()/() 116/62  Pulse range: Heart Rate:  [52-87] 57  Resp rate range: Resp:  [18-24] 22  Temp range: Temp (24hrs), Av.1 °F (36.7 °C), Min:97.7 °F (36.5 °C), Max:99.1 °F (37.3 °C)      Ventilator/Non-Invasive Ventilation Settings     None          Device (Oxygen Therapy): high-flow nasal cannula       82.2 kg (181 lb 3.5 oz); Body mass index is 24.57 kg/m².      Intake/Output Summary (Last 24 hours) at 18 0817  Last data filed at 18 0616   Gross per 24 hour   Intake              275 ml   Output             1420 ml   Net            -1145 ml       Exam:    GEN:  Awake but drowsy male, minimally interactive @ baseline  EYES:   EOM-i, anicteric sclera bilat  ENT:    External ears/nose normal, OP dry  NECK:  Supple, Size 4 metal juanjose, No JVD or cervical LApathy  LUNGS: Bilateral breath sounds heard, Dec BS @ bases, rhonchi+   CV:  Regular rate and rhythm, No murmur  ABD:  Non tender, no distended, no palpable liver or masses, PEG +  EXT:  No cyanosis or clubbing, No peripheral/sacral edema    Scheduled meds:      arformoterol 15 mcg Nebulization BID - RT   ARIPiprazole 15 mg Per G Tube Daily   aspirin 325 mg Per G Tube Daily   baclofen 10 mg Per G Tube TID   carBAMazepine 100 mg Per G Tube BID   cetirizine 10 mg Per G Tube Daily   digoxin 125 mcg Per G Tube Daily   diltiaZEM 30 mg Per G Tube TID   enoxaparin 40 mg Subcutaneous Q24H   insulin aspart 0-7 Units  Subcutaneous Q6H   lansoprazole 30 mg Nasogastric QAM   levETIRAcetam 750 mg Per G Tube Q12H   levothyroxine 50 mcg Per G Tube Daily   Scopolamine 1 patch Transdermal Q72H   sennosides-docusate sodium 2 tablet Oral Nightly   sodium chloride 4 mL Nebulization BID - RT       IV meds:                           Data Review:        Results from last 7 days  Lab Units 06/01/18  0441 05/31/18  1009 05/30/18  0413 05/29/18  2347   SODIUM mmol/L 139 141 141 139   POTASSIUM mmol/L 3.8 3.9 4.1 4.4   CHLORIDE mmol/L 95* 97* 101 97*   CO2 mmol/L 36.9* 34.7* 31.4* 32.8*   BUN mg/dL 21* 19 17 16   CREATININE mg/dL 0.45* 0.41* 0.47* 0.48*   CALCIUM mg/dL 8.1* 7.9* 7.9* 8.7   BILIRUBIN mg/dL  --   --  0.2 0.3   ALK PHOS U/L  --   --  50 58   ALT (SGPT) U/L  --   --  20 26   AST (SGOT) U/L  --   --  14 19   GLUCOSE mg/dL 120* 149* 112* 196*   WBC 10*3/mm3 8.02 6.33 5.96 6.02   HEMOGLOBIN g/dL 13.7 13.6* 12.7* 14.8   PLATELETS 10*3/mm3 111* 115* 120* 127*   PROBNP pg/mL  --   --   --  182.9   PROCALCITONIN ng/mL  --   --  0.06*  --        Lab Results   Component Value Date    CALCIUM 8.1 (L) 06/01/2018         Results from last 7 days  Lab Units 05/30/18  0336 05/30/18  0322 05/29/18  2359 05/29/18  2343   BLOODCX   --   --  No growth at 2 days No growth at 2 days   RESPCX  Light growth (2+) Normal Respiratory Yanet  --   --   --    GRAM STAIN RESULT  Few (2+) Mixed bacterial morphotypes seen on Gram Stain  Rare (1+) WBCs per low power field  Rare (1+) Epithelial cells per low power field  --   --   --    MRSA SCREEN CX   --  No Methicillin Resistant Staphylococcus aureus Isolated at 24 hours  --   --          Results from last 7 days  Lab Units 05/29/18  0047   TROPONIN T ng/mL <0.010       Results Review:    I have reviewed the available laboratory results and reviewed the chest imaging personally    Imaging Results (all)     Procedure Component Value Units Date/Time    XR Chest 1 View [[id number]] Collected:  05/29/18 4959      "Updated:  05/29/18 0845    Narrative:       X-RAY CHEST 1 VIEW.     HISTORY: Shortness of breath.     COMPARISON: No prior studies for comparison.     FINDINGS:  Borderline cardiomegaly, previously tube is in position.         Pulmonary congestion. Opacity at the left lung base and elevation of the  left hemidiaphragm.              Impression:       Infiltrate of the left lung base cannot be excluded, clinical  correlation is recommended.                   ASSESSMENT:   Ac on chronic hypoxic resp failure (On nocturnal o2 only @ baseline)  Ac on chronic hypercapnic resp failure   Parainfluenza URTI   LLL atelectasis/ ? pna  Metabolic encephalopathy   H/o TBI s/p trach - plugged usually   Dysphagia s/p PEG   Chronic ruiz - replaced     PLAN:  LLL atelectasis with hilar fullness and mucus pluggings.   Plan for trach upsize and bronch today in CCU.    Will wean suppl o2 as tolerated. Will rest him on the vent post procedure  Appreciate neuro input and will need to back off meds aggressively. I suspect most of the sedation and chronic hypercapnic resp failure is from it.    Might end up needing AVAPS QHS on a plugged trach if we cant make progress with chronic resp failure. ? If he will be more awake than and can cough up preventing pna / resp failure    Would c/w watching off abx. Will get a BAL when we bronch.   Will c/w mucus clearance techniques - BD/ CHest PT   Pt on bumex with no diagnosis of CHF. ECHO shows normal EF. No DD. RAP 8, RVSP 15. Getting spot lasix for \"secretions\" essentially.  Full Code   DVT ppx     CC- 39 mins    I have discussed my findings and recommendations with patient, family and nursing staff.     Juan Blankenship MD  6/1/2018  "

## 2018-06-01 NOTE — OP NOTE
Bronchoscopy Procedure Note    Procedure:  1. Bronchoscopy, Diagnostic  2. Bronchoscopy, Therapeutic  3. Bronchoalveolar lavage, BAL    Pre-Operative Diagnosis: LLL atelectasis       Tracheostomy position confirmation    Post-Operative Diagnosis: Same    Anesthesia: Moderate Sedation    Procedure Details: Patient was consented for the procedure with all risk and benefit of the procedure explained in detail.  Patient was given the opportunity to ask questions and all concerns were answered.  The bronchocope was inserted into the main airway via the trach. A detailed anatomical survey was done of the airways from the trachea till  the visualized subsegmental bronchi and the findings are as reported below.     A bronchialalveolar lavage was performed using aliquots of normal saline, a total of 90 cc was instilled into the airways and 60 cc was aspirated back.    Findings:  1) Diffusely erythematous airways  2) Thick mucoid secretions occluding the LLL bronchus s/p therapeutic suctioning   3) Successful BAL of the LLL was performed as mentioned above.    4) Trach position confirmed and is approximately 5 cms above the julio c.    Estimated Blood Loss:  Minimal and self limited                Complications: None; patient tolerated the procedure well.           Disposition: CCU    Patient tolerated the procedure well and I have updated the family at bedside    Juan Blankenship MD  6/1/2018  9:58 AM

## 2018-06-01 NOTE — PLAN OF CARE
Problem: Breathing Pattern Ineffective (Adult)  Intervention: Optimize Oxygenation/Ventilation/Perfusion   06/01/18 0013   Respiratory Interventions   Airway/Ventilation Management airway patency maintained;humidification applied;pulmonary hygiene promoted

## 2018-06-01 NOTE — PLAN OF CARE
Problem: Breathing Pattern Ineffective (Adult)  Goal: Effective Oxygenation/Ventilation  Outcome: Ongoing (interventions implemented as appropriate)

## 2018-06-01 NOTE — OP NOTE
Pre-procedure Diagnosis: Acute hypoxic respiratory failure     Post procedure Diagnosis: Same    SEDATION: Moderate sedation was provided by me personally with face to face time from 9:10 AM to 9:45 AM.     MEDICATIONS: Plz see MAR     DESCRIPTION OF PROCEDURE:   The procedure was performed in the patient's ICU bed. The patient was lying in a supine position with rolled up towels placed under the shoulders to hyperextend the neck. The neck anatomy was then examined. The pt had a prior long term trach size 4 metal juanjose. The cricoid cartilage, the laryngeal cartilage, tracheal rings, sternal notch and sternocleidomastoid muscles could be easily palpated and identified. The anterior part of the neck was then prepped and draped in the usual sterile fashion.     Next, a 1.5 centimeter vertical midline incision was made in  the anterior part of the neck opposite the 2nd tracheal ring extending the prior trach incision inferiorly. Blunt dissection was done down to the level of the trachea. Next, a guidewire was passed into the trachea through the existing small hole using the Seldinger technique. Next, a curved Rhino dilator preloaded on a guiding catheter, was passed as a single unit over the guidewire to enter the trachea and dilate  the stoma.     The curved Rhino dilator was then removed, leaving the guidewire and  guiding catheter in place. Initially a size 8 trach was attempted but we could not successfully pass it.  Next, a size 6 cuff Shiley tracheostomy tube was preloaded on the 26 -Micronesian curved dilator, and then it was passed over the guidewire and guiding catheter to enter the trachea without difficulty. This was than visualized on the bronchoscopic review.     The flexible fiberoptic bronchoscope was then used to confirm the trach position by passing it through the trach. The julio c was about 4 centimeters from the tip of the tracheostomy tube. The inner cannula was then placed, and the patient was  connected to the ventilator through the tracheostomy showing good tidal volume return,  again confirming placement. The tracheostomy was finally secured in place sutures and a soft Velcro tracheostomy tie.      The patient tolerated the procedure very well other than brief desaturation for less than 30 secs needing Bag and mask ventilation with good recovery. Family updated at bedside post procedure.     ESTIMATED BLOOD LOSS: Minimal expected blood loss.      COMPLICATIONS : No immediate periop complications noted.

## 2018-06-01 NOTE — PLAN OF CARE
Problem: Breathing Pattern Ineffective (Adult)  Goal: Identify Related Risk Factors and Signs and Symptoms  Outcome: Ongoing (interventions implemented as appropriate)   06/01/18 0013   Breathing Pattern Ineffective (Adult)   Related Risk Factors (Breathing Pattern Ineffective) immobility   Signs and Symptoms (Breathing Pattern Ineffective) breath sounds abnormal;cough ineffective

## 2018-06-01 NOTE — PLAN OF CARE
Problem: Breathing Pattern Ineffective (Adult)  Intervention: Minimize Oxygen Consumption/Demand   06/01/18 0014   Coping/Psychosocial Interventions   Environmental Support calm environment promoted

## 2018-06-02 ENCOUNTER — APPOINTMENT (OUTPATIENT)
Dept: GENERAL RADIOLOGY | Facility: HOSPITAL | Age: 58
End: 2018-06-02

## 2018-06-02 ENCOUNTER — APPOINTMENT (OUTPATIENT)
Dept: CT IMAGING | Facility: HOSPITAL | Age: 58
End: 2018-06-02

## 2018-06-02 LAB
ANION GAP SERPL CALCULATED.3IONS-SCNC: 9.4 MMOL/L
BASOPHILS # BLD AUTO: 0.02 10*3/MM3 (ref 0–0.2)
BASOPHILS NFR BLD AUTO: 0.2 % (ref 0–1.5)
BUN BLD-MCNC: 31 MG/DL (ref 6–20)
BUN/CREAT SERPL: 70.5 (ref 7–25)
C DIFF TOX GENS STL QL NAA+PROBE: NEGATIVE
CALCIUM SPEC-SCNC: 8.8 MG/DL (ref 8.6–10.5)
CHLORIDE SERPL-SCNC: 93 MMOL/L (ref 98–107)
CO2 SERPL-SCNC: 35.6 MMOL/L (ref 22–29)
CREAT BLD-MCNC: 0.44 MG/DL (ref 0.76–1.27)
DEPRECATED RDW RBC AUTO: 53.8 FL (ref 37–54)
EOSINOPHIL # BLD AUTO: 0 10*3/MM3 (ref 0–0.7)
EOSINOPHIL NFR BLD AUTO: 0 % (ref 0.3–6.2)
ERYTHROCYTE [DISTWIDTH] IN BLOOD BY AUTOMATED COUNT: 14.6 % (ref 11.5–14.5)
GFR SERPL CREATININE-BSD FRML MDRD: >150 ML/MIN/1.73
GLUCOSE BLD-MCNC: 146 MG/DL (ref 65–99)
GLUCOSE BLDC GLUCOMTR-MCNC: 121 MG/DL (ref 70–130)
GLUCOSE BLDC GLUCOMTR-MCNC: 145 MG/DL (ref 70–130)
GLUCOSE BLDC GLUCOMTR-MCNC: 169 MG/DL (ref 70–130)
HCT VFR BLD AUTO: 41.9 % (ref 40.4–52.2)
HGB BLD-MCNC: 13.4 G/DL (ref 13.7–17.6)
IMM GRANULOCYTES # BLD: 0.02 10*3/MM3 (ref 0–0.03)
IMM GRANULOCYTES NFR BLD: 0.2 % (ref 0–0.5)
LYMPHOCYTES # BLD AUTO: 1.57 10*3/MM3 (ref 0.9–4.8)
LYMPHOCYTES NFR BLD AUTO: 13.7 % (ref 19.6–45.3)
MCH RBC QN AUTO: 32.3 PG (ref 27–32.7)
MCHC RBC AUTO-ENTMCNC: 32 G/DL (ref 32.6–36.4)
MCV RBC AUTO: 101 FL (ref 79.8–96.2)
MONOCYTES # BLD AUTO: 0.83 10*3/MM3 (ref 0.2–1.2)
MONOCYTES NFR BLD AUTO: 7.3 % (ref 5–12)
NEUTROPHILS # BLD AUTO: 8.98 10*3/MM3 (ref 1.9–8.1)
NEUTROPHILS NFR BLD AUTO: 78.6 % (ref 42.7–76)
PLATELET # BLD AUTO: 121 10*3/MM3 (ref 140–500)
PMV BLD AUTO: 9.7 FL (ref 6–12)
POTASSIUM BLD-SCNC: 3.1 MMOL/L (ref 3.5–5.2)
PROCALCITONIN SERPL-MCNC: 0.08 NG/ML (ref 0.1–0.25)
RBC # BLD AUTO: 4.15 10*6/MM3 (ref 4.6–6)
SODIUM BLD-SCNC: 138 MMOL/L (ref 136–145)
WBC NRBC COR # BLD: 11.42 10*3/MM3 (ref 4.5–10.7)

## 2018-06-02 PROCEDURE — 85025 COMPLETE CBC W/AUTO DIFF WBC: CPT | Performed by: INTERNAL MEDICINE

## 2018-06-02 PROCEDURE — 94668 MNPJ CHEST WALL SBSQ: CPT

## 2018-06-02 PROCEDURE — 80048 BASIC METABOLIC PNL TOTAL CA: CPT | Performed by: INTERNAL MEDICINE

## 2018-06-02 PROCEDURE — 94799 UNLISTED PULMONARY SVC/PX: CPT

## 2018-06-02 PROCEDURE — 74176 CT ABD & PELVIS W/O CONTRAST: CPT

## 2018-06-02 PROCEDURE — 94003 VENT MGMT INPAT SUBQ DAY: CPT

## 2018-06-02 PROCEDURE — 25010000002 FENTANYL CITRATE (PF) 100 MCG/2ML SOLUTION: Performed by: INTERNAL MEDICINE

## 2018-06-02 PROCEDURE — 63710000001 INSULIN ASPART PER 5 UNITS: Performed by: INTERNAL MEDICINE

## 2018-06-02 PROCEDURE — 82962 GLUCOSE BLOOD TEST: CPT

## 2018-06-02 PROCEDURE — 84145 PROCALCITONIN (PCT): CPT | Performed by: INTERNAL MEDICINE

## 2018-06-02 PROCEDURE — 87040 BLOOD CULTURE FOR BACTERIA: CPT | Performed by: INTERNAL MEDICINE

## 2018-06-02 PROCEDURE — 25010000002 ENOXAPARIN PER 10 MG: Performed by: INTERNAL MEDICINE

## 2018-06-02 PROCEDURE — 74018 RADEX ABDOMEN 1 VIEW: CPT

## 2018-06-02 PROCEDURE — 99223 1ST HOSP IP/OBS HIGH 75: CPT | Performed by: INTERNAL MEDICINE

## 2018-06-02 PROCEDURE — 87493 C DIFF AMPLIFIED PROBE: CPT | Performed by: INTERNAL MEDICINE

## 2018-06-02 RX ADMIN — BACLOFEN 10 MG: 10 TABLET ORAL at 21:29

## 2018-06-02 RX ADMIN — DILTIAZEM HYDROCHLORIDE 30 MG: 30 TABLET, FILM COATED ORAL at 16:02

## 2018-06-02 RX ADMIN — INSULIN ASPART 2 UNITS: 100 INJECTION, SOLUTION INTRAVENOUS; SUBCUTANEOUS at 11:44

## 2018-06-02 RX ADMIN — ALBUTEROL SULFATE 6 PUFF: 90 AEROSOL, METERED RESPIRATORY (INHALATION) at 15:27

## 2018-06-02 RX ADMIN — ENOXAPARIN SODIUM 40 MG: 100 INJECTION SUBCUTANEOUS at 06:10

## 2018-06-02 RX ADMIN — POTASSIUM CHLORIDE 40 MEQ: 1.5 POWDER, FOR SOLUTION ORAL at 09:00

## 2018-06-02 RX ADMIN — DILTIAZEM HYDROCHLORIDE 30 MG: 30 TABLET, FILM COATED ORAL at 09:00

## 2018-06-02 RX ADMIN — LEVOTHYROXINE SODIUM 50 MCG: 50 TABLET ORAL at 09:01

## 2018-06-02 RX ADMIN — ALBUTEROL SULFATE 6 PUFF: 90 AEROSOL, METERED RESPIRATORY (INHALATION) at 11:11

## 2018-06-02 RX ADMIN — ALBUTEROL SULFATE 6 PUFF: 90 AEROSOL, METERED RESPIRATORY (INHALATION) at 22:58

## 2018-06-02 RX ADMIN — DIGOXIN 125 MCG: 0.12 TABLET ORAL at 16:02

## 2018-06-02 RX ADMIN — CARBAMAZEPINE 100 MG: 100 SUSPENSION ORAL at 21:29

## 2018-06-02 RX ADMIN — LEVETIRACETAM 750 MG: 500 TABLET, FILM COATED ORAL at 19:03

## 2018-06-02 RX ADMIN — CETIRIZINE HYDROCHLORIDE 10 MG: 10 TABLET, FILM COATED ORAL at 09:01

## 2018-06-02 RX ADMIN — BACLOFEN 10 MG: 10 TABLET ORAL at 09:00

## 2018-06-02 RX ADMIN — POTASSIUM CHLORIDE 40 MEQ: 1.5 POWDER, FOR SOLUTION ORAL at 21:29

## 2018-06-02 RX ADMIN — LANSOPRAZOLE 30 MG: KIT at 06:11

## 2018-06-02 RX ADMIN — SCOPOLAMINE 1 PATCH: 1 PATCH, EXTENDED RELEASE TRANSDERMAL at 16:02

## 2018-06-02 RX ADMIN — ASPIRIN 325 MG: 325 TABLET ORAL at 09:01

## 2018-06-02 RX ADMIN — ALBUTEROL SULFATE 6 PUFF: 90 AEROSOL, METERED RESPIRATORY (INHALATION) at 20:56

## 2018-06-02 RX ADMIN — ALBUTEROL SULFATE 6 PUFF: 90 AEROSOL, METERED RESPIRATORY (INHALATION) at 07:32

## 2018-06-02 RX ADMIN — LEVETIRACETAM 750 MG: 500 TABLET, FILM COATED ORAL at 06:10

## 2018-06-02 RX ADMIN — BACLOFEN 10 MG: 10 TABLET ORAL at 16:02

## 2018-06-02 RX ADMIN — FENTANYL CITRATE 12.5 MCG: 50 INJECTION, SOLUTION INTRAMUSCULAR; INTRAVENOUS at 04:18

## 2018-06-02 RX ADMIN — CARBAMAZEPINE 100 MG: 100 SUSPENSION ORAL at 09:01

## 2018-06-02 RX ADMIN — ALBUTEROL SULFATE 6 PUFF: 90 AEROSOL, METERED RESPIRATORY (INHALATION) at 03:33

## 2018-06-02 RX ADMIN — DILTIAZEM HYDROCHLORIDE 30 MG: 30 TABLET, FILM COATED ORAL at 21:29

## 2018-06-02 RX ADMIN — ARIPIPRAZOLE 15 MG: 5 TABLET ORAL at 09:00

## 2018-06-02 RX ADMIN — ACETAMINOPHEN 650 MG: 325 TABLET ORAL at 16:07

## 2018-06-02 NOTE — PLAN OF CARE
Problem: Breathing Pattern Ineffective (Adult)  Intervention: Optimize Oxygenation/Ventilation/Perfusion  Pt placed on pressure support. Unable to maintain sats on PS 8 and 40%. Pt on PS for 90 min. Placed back on AC/VC.

## 2018-06-02 NOTE — PLAN OF CARE
Problem: Patient Care Overview  Goal: Plan of Care Review  Outcome: Ongoing (interventions implemented as appropriate)   06/02/18 0659   Coping/Psychosocial   Plan of Care Reviewed With father;mother   OTHER   Outcome Summary Pt remains on tube feeds. VS stable. Pt is on the vent with propofol, plans to wean today.    Plan of Care Review   Progress no change       Problem: Skin Injury Risk (Adult)  Goal: Skin Health and Integrity  Outcome: Ongoing (interventions implemented as appropriate)      Problem: Infection, Risk/Actual (Adult)  Goal: Infection Prevention/Resolution  Outcome: Ongoing (interventions implemented as appropriate)      Problem: Nutrition, Enteral (Adult)  Goal: Signs and Symptoms of Listed Potential Problems Will be Absent, Minimized or Managed (Nutrition, Enteral)  Outcome: Ongoing (interventions implemented as appropriate)      Problem: Breathing Pattern Ineffective (Adult)  Goal: Effective Oxygenation/Ventilation  Outcome: Ongoing (interventions implemented as appropriate)    Goal: Anxiety/Fear Reduction  Outcome: Ongoing (interventions implemented as appropriate)

## 2018-06-02 NOTE — CONSULTS
[facility]              Initial Inpatient Consult Note  Referring Provider: Carlota  Reason for Consultation: Abdominal distention    Subjective     History of present illness:  Patient was admitted for respiratory insufficiency.  Over the last 24 hours he has been noted to have increasing abdominal distention.  The patient is not verbal but staff believes she is been in some distress.  Both CT scan and abdominal series demonstrated diffuse colon distention with diameters measuring up to 10.5 cm.  He's also been noted to be febrile today.    Past Medical History:  Past Medical History:   Diagnosis Date   • Gastrostomy tube in place    • TBI (traumatic brain injury)    • Tracheostomy dependent        Past Surgical History:  Past Surgical History:   Procedure Laterality Date   • HIP SURGERY     • LEG SURGERY          Social History:   Social History   Substance Use Topics   • Smoking status: Never Smoker   • Smokeless tobacco: Not on file   • Alcohol use No        Family History:  History reviewed. No pertinent family history.    Home Meds:  Prescriptions Prior to Admission   Medication Sig Dispense Refill Last Dose   • ARIPiprazole (ABILIFY) 20 MG tablet 20 mg by Per G Tube route Daily.      • aspirin 325 MG tablet 325 mg by Per G Tube route Daily.      • baclofen (LIORESAL) 20 MG tablet 20 mg by Per G Tube route 3 (Three) Times a Day.      • bumetanide (BUMEX) 0.5 MG tablet 0.5 mg by Per G Tube route Daily.      • carBAMazepine (TEGretol) 100 MG/5ML suspension by Per G Tube route 2 (Two) Times a Day.      • cetirizine (zyrTEC) 10 MG tablet 10 mg by Per G Tube route Daily.      • digoxin (LANOXIN) 125 MCG tablet 125 mcg by Per G Tube route Daily.      • diltiaZEM (CARDIZEM) 30 MG tablet 30 mg by Per G Tube route 3 (Three) Times a Day.      • lansoprazole (PREVACID) 30 MG capsule Take 30 mg by mouth Daily.      • levETIRAcetam (KEPPRA) 750 MG tablet 750 mg by Per G Tube route 2  (Two) Times a Day.      • levothyroxine (SYNTHROID, LEVOTHROID) 50 MCG tablet 50 mcg by Per G Tube route Daily.          Current Meds:     albuterol 6 puff Inhalation Q4H - RT   ARIPiprazole 15 mg Per G Tube Daily   aspirin 325 mg Per G Tube Daily   baclofen 10 mg Per G Tube TID   carBAMazepine 100 mg Per G Tube BID   cetirizine 10 mg Per G Tube Daily   digoxin 125 mcg Per G Tube Daily   diltiaZEM 30 mg Per G Tube TID   enoxaparin 40 mg Subcutaneous Q24H   insulin aspart 0-7 Units Subcutaneous Q6H   lansoprazole 30 mg Nasogastric QAM   levETIRAcetam 750 mg Per G Tube Q12H   levothyroxine 50 mcg Per G Tube Daily   Scopolamine 1 patch Transdermal Q72H   sennosides-docusate sodium 2 tablet Oral Nightly       Allergies:  Allergies   Allergen Reactions   • Ciprofloxacin Unknown (See Comments)     Decreases BP per family report     • Contrast Dye Swelling   • Amoxicillin Rash   • Augmentin [Amoxicillin-Pot Clavulanate] Rash       Review of Systems  Review of systems could not be obtained due to   patient nonverbal.    Objective     Vital Signs  Temp:  [100.3 °F (37.9 °C)-101.1 °F (38.4 °C)] 101.1 °F (38.4 °C)  Heart Rate:  [58-95] 79  Resp:  [20-24] 21  BP: ()/() 117/70  FiO2 (%):  [40 %-41 %] 40 %    Physical Exam:   Chronically ill-appearing male.  He is being ventilated via tracheostomy.   Head examination demonstrates evidence of previous trauma.   Appears to be some scarring involving the right orbit.  Left eye looks normal.   Hearing cannot be assessed.    Neck is supple.  Tracheostomy in place.   He's being mechanically ventilated.  Coarse rales throughout the lungs.   Cardiac examination is normal.  There is no pedal edema.   His abdomen is distended.  Bowel sounds are present.  He is tympanitic on percussion.  Palpation does not appear to elicit any pain response.  There is no gross hepatosplenomegaly.   Rectal examination demonstrates rather high anal sphincter tone.  With simple dilation he passed a  large amount of liquid stool and gas.   No obvious rash or crepitus.   Neurologic examination demonstrates evidence of orientation and he is completely nonverbal.    Results Review:   I reviewed the patient's new clinical results.    WBC No results found for: WBCS   HGB Hemoglobin   Date Value Ref Range Status   06/02/2018 13.4 (L) 13.7 - 17.6 g/dL Final   06/01/2018 13.7 13.7 - 17.6 g/dL Final   05/31/2018 13.6 (L) 13.7 - 17.6 g/dL Final      HCT Hematocrit   Date Value Ref Range Status   06/02/2018 41.9 40.4 - 52.2 % Final   06/01/2018 43.6 40.4 - 52.2 % Final   05/31/2018 40.3 (L) 40.4 - 52.2 % Final      Platlets No results found for: LABPLAT   MCV MCV   Date Value Ref Range Status   06/02/2018 101.0 (H) 79.8 - 96.2 fL Final   06/01/2018 101.6 (H) 79.8 - 96.2 fL Final   05/31/2018 95.0 79.8 - 96.2 fL Final          Sodium Sodium   Date Value Ref Range Status   06/02/2018 138 136 - 145 mmol/L Final   06/01/2018 139 136 - 145 mmol/L Final   05/31/2018 141 136 - 145 mmol/L Final      Potassium Potassium   Date Value Ref Range Status   06/02/2018 3.1 (L) 3.5 - 5.2 mmol/L Final   06/01/2018 3.8 3.5 - 5.2 mmol/L Final   05/31/2018 3.9 3.5 - 5.2 mmol/L Final      Chloride Chloride   Date Value Ref Range Status   06/02/2018 93 (L) 98 - 107 mmol/L Final   06/01/2018 95 (L) 98 - 107 mmol/L Final   05/31/2018 97 (L) 98 - 107 mmol/L Final      CO2 CO2   Date Value Ref Range Status   06/02/2018 35.6 (H) 22.0 - 29.0 mmol/L Final   06/01/2018 36.9 (H) 22.0 - 29.0 mmol/L Final   05/31/2018 34.7 (H) 22.0 - 29.0 mmol/L Final      BUN BUN   Date Value Ref Range Status   06/02/2018 31 (H) 6 - 20 mg/dL Final   06/01/2018 21 (H) 6 - 20 mg/dL Final   05/31/2018 19 6 - 20 mg/dL Final      Creatinine Creatinine   Date Value Ref Range Status   06/02/2018 0.44 (L) 0.76 - 1.27 mg/dL Final   06/01/2018 0.45 (L) 0.76 - 1.27 mg/dL Final   05/31/2018 0.41 (L) 0.76 - 1.27 mg/dL Final      Calcium Calcium   Date Value Ref Range Status    06/02/2018 8.8 8.6 - 10.5 mg/dL Final   06/01/2018 8.1 (L) 8.6 - 10.5 mg/dL Final   05/31/2018 7.9 (L) 8.6 - 10.5 mg/dL Final      Albumin No results found for: ALBUMIN   AST  ALT  PT/INR:   No results found for: AST  No results found for: ALT  No results found for: PROTIME/No results found for: INR         Imaging Results (last 72 hours)     Procedure Component Value Units Date/Time    CT Abdomen Pelvis Without Contrast [[id number]] Collected:  06/02/18 1309     Updated:  06/02/18 1349    Narrative:       CT ABDOMEN PELVIS WITHOUT CONTRAST-     CLINICAL: Ileus versus megacolon, traumatic brain injury patient with  abdominal distention.     Radiation dose reduction techniques were utilized, including automated  exposure control and exposure modulation based on body size.     FINDINGS: The stomach is collapsed, the G-tube position is satisfactory.  The bladder is collapsed, Hay catheter position is satisfactory.     The colon is grossly distended without wall thickening or transition  point. There is gradual tapering to the rectum. The colon is filled with  fluid and a small amount of formed fecal material.     The small bowel is normal in appearance. No free intraperitoneal air or  free fluid identified.     Several small nonobstructing left renal calculi measuring up to 6 mm.  The left kidney is somewhat small in size compared to the right.     Bilateral lower lobe lung consolidation. 2.6 cm hepatic cyst identified.  The gallbladder has either been removed or it is completely collapsed.  No given history of previous cholecystectomy. In the gallbladder fossa  there is a calcific density measuring 1.4 cm, gallstone possible.     The pancreas, spleen and imaging of glands are within normal limits.  Caliber of the aorta is normal.     CONCLUSION:  1. Bilateral lower lobe lung consolidation, possible pneumonia.  2. G-tube and Hay catheter positions are satisfactory.  3. Nonobstructing left renal calculi.  4.  Hepatic cysts.  5. Possible gallstone, the gallbladder has either been removed or  completely collapsed, no given history of cholecystectomy.  6. The entire colon is dilated and slowly tapers to the rectum, it is  filled with fluid and a small amount of formed fecal material. No wall  thickening or transition point and the small bowel caliber is normal.     This report was finalized on 6/2/2018 1:46 PM by Dr. Magdi Tse M.D.       XR Abdomen KUB [[id number]] Collected:  06/02/18 1057     Updated:  06/02/18 1108    Narrative:       ONE VIEW PORTABLE ABDOMEN AT 10:39 AM     HISTORY: Abdominal distention.     There is marked gaseous distention of the entire colon with several  segments of the colon measuring up to 10.5 cm in diameter. There is  relatively little definite small bowel distention and the findings are  nonspecific but could relate to either severe ileus or possibly toxic  megacolon and further clinical correlation is required.     This report was finalized on 6/2/2018 11:05 AM by Dr. Jonathan Clarke M.D.       XR Chest 1 View [[id number]] Collected:  05/29/18 2349     Updated:  05/30/18 2253    Narrative:       X-RAY CHEST 1 VIEW.     HISTORY: Shortness of breath.     COMPARISON: No prior studies for comparison.     FINDINGS:  Borderline cardiomegaly, previously tube is in position.         Pulmonary congestion. Opacity at the left lung base and elevation of the  left hemidiaphragm.              Impression:       Infiltrate of the left lung base cannot be excluded, clinical  correlation is recommended.         This report was finalized on 5/30/2018 10:50 PM by Dr. Simi Freedman M.D.             Assessment/Plan     Principal Problem:    Acute respiratory failure with hypoxia and hypercapnia  Active Problems:    Respiratory failure      This is likely to be nonobstructive colonic inertia/distention.  I see no evidence of an obstructive process at all.  It is encouraging that he passed a great deal of gas  with simple digital examination of the rectum.  This suggests that he would have a good response to temporary placement of a fecal management system, i.e. rectal tube.  If he does not respond readily to this, I would discontinue some of his medications including the scopolamine  and anything acid as anticholinergic effects.  I will recheck his flat plate in the morning.      I discussed the patients findings and my recommendations with nursing staff    Massimo Gooden MD  [facility]  06/02/18  5:13 PM

## 2018-06-02 NOTE — PROGRESS NOTES
Aman Van MD                          [phone]      Patient ID:    Name:  Brayden Lynn    MRN:  [id number]    1960   57 y.o.  male            Patient Care Team:  Guillermo Do MD as PCP - General (Internal Medicine)      CC/Reason for visit:     Subjective:  Bronch and trach yesterday at bedside.  Trach upsize to shiley cuff size 6.  Increased shake of right arm per family (who has taken care of him for past 57years)    Objective     Vital Signs past 24hrs    BP range: BP: ()/() 155/108  Pulse range: Heart Rate:  [54-87] 77  Resp rate range: Resp:  [20-24] 24  Temp range: Temp (24hrs), Av.2 °F (37.3 °C), Min:98.4 °F (36.9 °C), Max:100.3 °F (37.9 °C)      Device (Oxygen Therapy): ventilator FiO2 (%): 40 %     81.3 kg (179 lb 3.7 oz); Body mass index is 24.3 kg/m².      Intake/Output Summary (Last 24 hours) at 18 0938  Last data filed at 18 0516   Gross per 24 hour   Intake             1586 ml   Output              450 ml   Net             1136 ml     FiO2 (%):  [40 %-50 %] 40 %  S RR:  [16-24] 20  PEEP/CPAP (cm H2O):  [5 cm H20] 5 cm H20  MAP (cm H2O):  [9.8-17] 9.8    Exam:    GEN:  Awake but drowsy male, minimally interactive ( baseline)  EYES:   EOM-i, anicteric sclera bilat  ENT:    External ears/nose normal, OP dry  NECK:  Supple, Size 6 shiley suture, cdi, no bleeding , No JVD or cervical lymphadenopthy  LUNGS: Bilateral breath sounds with rhonchi diffusely and diminished air entry at bilateral bases   CV:  Regular rate and rhythm, No murmur  ABD:  Non tender, +distended, no palpable liver or masses, PEG + hypoactive BS  EXT:  No cyanosis or clubbing, No peripheral/sacral edema fine tremor intermittently or right UE    Scheduled meds:      albuterol 6 puff Inhalation Q4H - RT   ARIPiprazole 15 mg Per G Tube Daily   aspirin 325 mg Per G Tube Daily   baclofen 10 mg Per G Tube TID   carBAMazepine 100 mg Per G Tube BID   cetirizine 10  mg Per G Tube Daily   digoxin 125 mcg Per G Tube Daily   diltiaZEM 30 mg Per G Tube TID   enoxaparin 40 mg Subcutaneous Q24H   insulin aspart 0-7 Units Subcutaneous Q6H   lansoprazole 30 mg Nasogastric QAM   levETIRAcetam 750 mg Per G Tube Q12H   levothyroxine 50 mcg Per G Tube Daily   Scopolamine 1 patch Transdermal Q72H   sennosides-docusate sodium 2 tablet Oral Nightly       IV meds:                          propofol 5-50 mcg/kg/min Last Rate: 10 mcg/kg/min (06/01/18 7168)       Data Review:        Results from last 7 days  Lab Units 06/02/18  0529 06/01/18  0441 05/31/18  1009 05/30/18  0413 05/29/18  2347   SODIUM mmol/L 138 139 141 141 139   POTASSIUM mmol/L 3.1* 3.8 3.9 4.1 4.4   CHLORIDE mmol/L 93* 95* 97* 101 97*   CO2 mmol/L 35.6* 36.9* 34.7* 31.4* 32.8*   BUN mg/dL 31* 21* 19 17 16   CREATININE mg/dL 0.44* 0.45* 0.41* 0.47* 0.48*   CALCIUM mg/dL 8.8 8.1* 7.9* 7.9* 8.7   BILIRUBIN mg/dL  --   --   --  0.2 0.3   ALK PHOS U/L  --   --   --  50 58   ALT (SGPT) U/L  --   --   --  20 26   AST (SGOT) U/L  --   --   --  14 19   GLUCOSE mg/dL 146* 120* 149* 112* 196*   WBC 10*3/mm3 11.42* 8.02 6.33 5.96 6.02   HEMOGLOBIN g/dL 13.4* 13.7 13.6* 12.7* 14.8   PLATELETS 10*3/mm3 121* 111* 115* 120* 127*   PROBNP pg/mL  --   --   --   --  182.9   PROCALCITONIN ng/mL  --   --   --  0.06*  --        Lab Results   Component Value Date    CALCIUM 8.8 06/02/2018         Results from last 7 days  Lab Units 05/30/18  0336 05/30/18  0322 05/29/18  6339 05/29/18  2346   BLOODCX   --   --  No growth at 3 days No growth at 3 days   RESPCX  Light growth (2+) Normal Respiratory Yanet  --   --   --    GRAM STAIN RESULT  Few (2+) Mixed bacterial morphotypes seen on Gram Stain  Rare (1+) WBCs per low power field  Rare (1+) Epithelial cells per low power field  --   --   --    MRSA SCREEN CX   --  No Methicillin Resistant Staphylococcus aureus isolated  --   --          Results from last 7 days  Lab Units 05/29/18  9410   TROPONIN T  ng/mL <0.010       Results Review:    I have reviewed the available laboratory results and reviewed the chest imaging personally    Imaging Results (all)     Procedure Component Value Units Date/Time    XR Chest 1 View [[id number]] Collected:  05/29/18 2349     Updated:  05/29/18 2349    Narrative:       X-RAY CHEST 1 VIEW.     HISTORY: Shortness of breath.     COMPARISON: No prior studies for comparison.     FINDINGS:  Borderline cardiomegaly, previously tube is in position.         Pulmonary congestion. Opacity at the left lung base and elevation of the  left hemidiaphragm.              Impression:       Infiltrate of the left lung base cannot be excluded, clinical  correlation is recommended.                   ASSESSMENT:   Ac on chronic hypoxic resp failure (On nocturnal o2 only @ baseline)  Ac on chronic hypercapnic resp failure   Parainfluenza URTI   LLL atelectasis/ ? pna  Metabolic encephalopathy   H/o TBI s/p trach - capped usually   Dysphagia s/p PEG   Chronic ruiz - replaced     PLAN:  LLL atelectasis with hilar fullness and mucus pluggings.   KUB hold tubefeeds  6/1/2018 trach upsize and bronch   Will wean suppl o2 as tolerated. Will rest him on the vent post procedure  Appreciate neuro input and will need to back off meds aggressively. I suspect most of the sedation and chronic hypercapnic resp failure is from it.    Follow up BAL and procal send today  Will c/w mucus clearance techniques - BD/ CHest PT   Pt on bumex with no diagnosis of CHF. ECHO shows normal EF. No DD. RAP 8, RVSP 15.   SBT today, if goes well would wean to trach collar  Full Code   DVT ppx     CC- 42minutes    I have discussed my findings and recommendations with patient, family and nursing staff.     Aman Van MD  6/2/2018

## 2018-06-03 ENCOUNTER — APPOINTMENT (OUTPATIENT)
Dept: GENERAL RADIOLOGY | Facility: HOSPITAL | Age: 58
End: 2018-06-03

## 2018-06-03 LAB
ANION GAP SERPL CALCULATED.3IONS-SCNC: 10.9 MMOL/L
BACTERIA SPEC AEROBE CULT: ABNORMAL
BACTERIA SPEC AEROBE CULT: ABNORMAL
BASOPHILS # BLD AUTO: 0.01 10*3/MM3 (ref 0–0.2)
BASOPHILS NFR BLD AUTO: 0.1 % (ref 0–1.5)
BUN BLD-MCNC: 24 MG/DL (ref 6–20)
BUN/CREAT SERPL: 51.1 (ref 7–25)
CALCIUM SPEC-SCNC: 8.3 MG/DL (ref 8.6–10.5)
CHLORIDE SERPL-SCNC: 100 MMOL/L (ref 98–107)
CO2 SERPL-SCNC: 31.1 MMOL/L (ref 22–29)
CREAT BLD-MCNC: 0.47 MG/DL (ref 0.76–1.27)
DEPRECATED RDW RBC AUTO: 52.9 FL (ref 37–54)
EOSINOPHIL # BLD AUTO: 0 10*3/MM3 (ref 0–0.7)
EOSINOPHIL NFR BLD AUTO: 0 % (ref 0.3–6.2)
ERYTHROCYTE [DISTWIDTH] IN BLOOD BY AUTOMATED COUNT: 14.4 % (ref 11.5–14.5)
GFR SERPL CREATININE-BSD FRML MDRD: >150 ML/MIN/1.73
GLUCOSE BLD-MCNC: 117 MG/DL (ref 65–99)
GLUCOSE BLDC GLUCOMTR-MCNC: 115 MG/DL (ref 70–130)
GLUCOSE BLDC GLUCOMTR-MCNC: 116 MG/DL (ref 70–130)
GLUCOSE BLDC GLUCOMTR-MCNC: 117 MG/DL (ref 70–130)
GLUCOSE BLDC GLUCOMTR-MCNC: 123 MG/DL (ref 70–130)
GLUCOSE BLDC GLUCOMTR-MCNC: 88 MG/DL (ref 70–130)
GRAM STN SPEC: ABNORMAL
HCT VFR BLD AUTO: 39.1 % (ref 40.4–52.2)
HGB BLD-MCNC: 12.4 G/DL (ref 13.7–17.6)
IMM GRANULOCYTES # BLD: 0.03 10*3/MM3 (ref 0–0.03)
IMM GRANULOCYTES NFR BLD: 0.3 % (ref 0–0.5)
LYMPHOCYTES # BLD AUTO: 1.29 10*3/MM3 (ref 0.9–4.8)
LYMPHOCYTES NFR BLD AUTO: 11.4 % (ref 19.6–45.3)
MAGNESIUM SERPL-MCNC: 2.4 MG/DL (ref 1.6–2.6)
MCH RBC QN AUTO: 31.6 PG (ref 27–32.7)
MCHC RBC AUTO-ENTMCNC: 31.7 G/DL (ref 32.6–36.4)
MCV RBC AUTO: 99.5 FL (ref 79.8–96.2)
MONOCYTES # BLD AUTO: 0.77 10*3/MM3 (ref 0.2–1.2)
MONOCYTES NFR BLD AUTO: 6.8 % (ref 5–12)
NEUTROPHILS # BLD AUTO: 9.18 10*3/MM3 (ref 1.9–8.1)
NEUTROPHILS NFR BLD AUTO: 81.4 % (ref 42.7–76)
PLATELET # BLD AUTO: 110 10*3/MM3 (ref 140–500)
PMV BLD AUTO: 9.1 FL (ref 6–12)
POTASSIUM BLD-SCNC: 3.3 MMOL/L (ref 3.5–5.2)
RBC # BLD AUTO: 3.93 10*6/MM3 (ref 4.6–6)
SODIUM BLD-SCNC: 142 MMOL/L (ref 136–145)
WBC NRBC COR # BLD: 11.28 10*3/MM3 (ref 4.5–10.7)

## 2018-06-03 PROCEDURE — 94003 VENT MGMT INPAT SUBQ DAY: CPT

## 2018-06-03 PROCEDURE — 74018 RADEX ABDOMEN 1 VIEW: CPT

## 2018-06-03 PROCEDURE — 94668 MNPJ CHEST WALL SBSQ: CPT

## 2018-06-03 PROCEDURE — 99232 SBSQ HOSP IP/OBS MODERATE 35: CPT | Performed by: INTERNAL MEDICINE

## 2018-06-03 PROCEDURE — 94799 UNLISTED PULMONARY SVC/PX: CPT

## 2018-06-03 PROCEDURE — 83735 ASSAY OF MAGNESIUM: CPT | Performed by: INTERNAL MEDICINE

## 2018-06-03 PROCEDURE — 82962 GLUCOSE BLOOD TEST: CPT

## 2018-06-03 PROCEDURE — 71045 X-RAY EXAM CHEST 1 VIEW: CPT

## 2018-06-03 PROCEDURE — 80048 BASIC METABOLIC PNL TOTAL CA: CPT | Performed by: INTERNAL MEDICINE

## 2018-06-03 PROCEDURE — 85025 COMPLETE CBC W/AUTO DIFF WBC: CPT | Performed by: INTERNAL MEDICINE

## 2018-06-03 PROCEDURE — 25010000002 ENOXAPARIN PER 10 MG: Performed by: INTERNAL MEDICINE

## 2018-06-03 RX ORDER — RISPERIDONE 1 MG/ML
1 SOLUTION ORAL 4 TIMES DAILY
Status: DISCONTINUED | OUTPATIENT
Start: 2018-06-03 | End: 2018-06-10 | Stop reason: CLARIF

## 2018-06-03 RX ADMIN — CARBAMAZEPINE 100 MG: 100 SUSPENSION ORAL at 09:12

## 2018-06-03 RX ADMIN — ARIPIPRAZOLE 15 MG: 5 TABLET ORAL at 09:12

## 2018-06-03 RX ADMIN — ALBUTEROL SULFATE 6 PUFF: 90 AEROSOL, METERED RESPIRATORY (INHALATION) at 11:38

## 2018-06-03 RX ADMIN — BACLOFEN 10 MG: 10 TABLET ORAL at 09:13

## 2018-06-03 RX ADMIN — LEVETIRACETAM 750 MG: 500 TABLET, FILM COATED ORAL at 06:10

## 2018-06-03 RX ADMIN — DILTIAZEM HYDROCHLORIDE 30 MG: 30 TABLET, FILM COATED ORAL at 16:52

## 2018-06-03 RX ADMIN — ALBUTEROL SULFATE 6 PUFF: 90 AEROSOL, METERED RESPIRATORY (INHALATION) at 07:00

## 2018-06-03 RX ADMIN — DILTIAZEM HYDROCHLORIDE 30 MG: 30 TABLET, FILM COATED ORAL at 09:13

## 2018-06-03 RX ADMIN — DILTIAZEM HYDROCHLORIDE 30 MG: 30 TABLET, FILM COATED ORAL at 21:24

## 2018-06-03 RX ADMIN — CARBAMAZEPINE 100 MG: 100 SUSPENSION ORAL at 21:24

## 2018-06-03 RX ADMIN — ALBUTEROL SULFATE 6 PUFF: 90 AEROSOL, METERED RESPIRATORY (INHALATION) at 03:19

## 2018-06-03 RX ADMIN — RISPERIDONE 1 MG: 1 SOLUTION ORAL at 18:23

## 2018-06-03 RX ADMIN — POTASSIUM CHLORIDE 40 MEQ: 1.5 POWDER, FOR SOLUTION ORAL at 18:23

## 2018-06-03 RX ADMIN — ASPIRIN 325 MG: 325 TABLET ORAL at 09:13

## 2018-06-03 RX ADMIN — CETIRIZINE HYDROCHLORIDE 10 MG: 10 TABLET, FILM COATED ORAL at 09:13

## 2018-06-03 RX ADMIN — BACLOFEN 10 MG: 10 TABLET ORAL at 16:52

## 2018-06-03 RX ADMIN — DIGOXIN 125 MCG: 0.12 TABLET ORAL at 12:59

## 2018-06-03 RX ADMIN — RISPERIDONE 1 MG: 1 SOLUTION ORAL at 12:59

## 2018-06-03 RX ADMIN — LEVETIRACETAM 750 MG: 500 TABLET, FILM COATED ORAL at 18:23

## 2018-06-03 RX ADMIN — LANSOPRAZOLE 30 MG: KIT at 06:39

## 2018-06-03 RX ADMIN — ALBUTEROL SULFATE 6 PUFF: 90 AEROSOL, METERED RESPIRATORY (INHALATION) at 23:22

## 2018-06-03 RX ADMIN — LEVOTHYROXINE SODIUM 50 MCG: 50 TABLET ORAL at 09:13

## 2018-06-03 RX ADMIN — ENOXAPARIN SODIUM 40 MG: 100 INJECTION SUBCUTANEOUS at 06:10

## 2018-06-03 RX ADMIN — BACLOFEN 10 MG: 10 TABLET ORAL at 21:24

## 2018-06-03 RX ADMIN — ALBUTEROL SULFATE 6 PUFF: 90 AEROSOL, METERED RESPIRATORY (INHALATION) at 15:02

## 2018-06-03 RX ADMIN — RISPERIDONE 1 MG: 1 SOLUTION ORAL at 21:25

## 2018-06-03 RX ADMIN — ALBUTEROL SULFATE 6 PUFF: 90 AEROSOL, METERED RESPIRATORY (INHALATION) at 20:28

## 2018-06-03 NOTE — PROGRESS NOTES
Aman Van MD                          [phone]      Patient ID:    Name:  Brayden Lynn    MRN:  [id number]    1960   57 y.o.  male            Patient Care Team:  Guillermo Do MD as PCP - General (Internal Medicine)      Subjective:  Bronch and trach Bedside 2018 Trach upsize to shiley cuff size 6.  abd distension yesterday with ct showing ileus.  PS yesterday.  Tmax 101.1    Objective     Vital Signs past 24hrs    BP range: BP: (103-156)/() 135/68  Pulse range: Heart Rate:  [72-95] 74  Resp rate range: Resp:  [20-22] 21  Temp range: Temp (24hrs), Av °F (37.8 °C), Min:99 °F (37.2 °C), Max:101.1 °F (38.4 °C)      Device (Oxygen Therapy): ventilator FiO2 (%): 40 %     81.7 kg (180 lb 1.9 oz); Body mass index is 24.42 kg/m².      Intake/Output Summary (Last 24 hours) at 18 0958  Last data filed at 18 0545   Gross per 24 hour   Intake              393 ml   Output             1155 ml   Net             -762 ml     FiO2 (%):  [40 %-100 %] 40 %  S RR:  [20] 20  PEEP/CPAP (cm H2O):  [5 cm H20] 5 cm H20  OH SUP:  [6 cm H20-8 cm H20] 8 cm H20  MAP (cm H2O):  [7.3-11] 7.7    Exam:    GEN:  Awake but drowsy male, minimally interactive (baseline)  EYES:   EOM-i, anicteric sclera bilat  ENT:    External ears/nose normal, OP dry  NECK:  Supple, Size 6 shiley suture, cdi, no bleeding , No JVD or cervical lymphadenopthy  LUNGS: Bilateral breath sounds with rhonchi diffusely and diminished air entry at bilateral bases   CV:  Regular rate and rhythm, No murmur  ABD:  Non tender, +distended but less so today, no palpable liver or masses, PEG + hypoactive BS  EXT:  No cyanosis or clubbing, No peripheral/sacral edema fine tremor intermittently or right UE    Scheduled meds:      albuterol 6 puff Inhalation Q4H - RT   ARIPiprazole 15 mg Per G Tube Daily   aspirin 325 mg Per G Tube Daily   baclofen 10 mg Per G Tube TID   carBAMazepine 100 mg Per G Tube BID    cetirizine 10 mg Per G Tube Daily   digoxin 125 mcg Per G Tube Daily   diltiaZEM 30 mg Per G Tube TID   enoxaparin 40 mg Subcutaneous Q24H   insulin aspart 0-7 Units Subcutaneous Q6H   lansoprazole 30 mg Nasogastric QAM   levETIRAcetam 750 mg Per G Tube Q12H   levothyroxine 50 mcg Per G Tube Daily   risperiDONE 1 mg Oral 4x Daily       IV meds:                          propofol 5-50 mcg/kg/min Last Rate: 10 mcg/kg/min (06/01/18 2255)       Data Review:        Results from last 7 days  Lab Units 06/03/18  0816 06/02/18  0529 06/01/18  0441  05/30/18  0413 05/29/18  2347   SODIUM mmol/L 142 138 139  < > 141 139   POTASSIUM mmol/L 3.3* 3.1* 3.8  < > 4.1 4.4   CHLORIDE mmol/L 100 93* 95*  < > 101 97*   CO2 mmol/L 31.1* 35.6* 36.9*  < > 31.4* 32.8*   BUN mg/dL 24* 31* 21*  < > 17 16   CREATININE mg/dL 0.47* 0.44* 0.45*  < > 0.47* 0.48*   CALCIUM mg/dL 8.3* 8.8 8.1*  < > 7.9* 8.7   BILIRUBIN mg/dL  --   --   --   --  0.2 0.3   ALK PHOS U/L  --   --   --   --  50 58   ALT (SGPT) U/L  --   --   --   --  20 26   AST (SGOT) U/L  --   --   --   --  14 19   GLUCOSE mg/dL 117* 146* 120*  < > 112* 196*   WBC 10*3/mm3 11.28* 11.42* 8.02  < > 5.96 6.02   HEMOGLOBIN g/dL 12.4* 13.4* 13.7  < > 12.7* 14.8   PLATELETS 10*3/mm3 110* 121* 111*  < > 120* 127*   PROBNP pg/mL  --   --   --   --   --  182.9   PROCALCITONIN ng/mL  --  0.08*  --   --  0.06*  --    < > = values in this interval not displayed.    Lab Results   Component Value Date    CALCIUM 8.3 (L) 06/03/2018         Results from last 7 days  Lab Units 06/02/18  1521 06/02/18  1512 05/30/18  0336 05/30/18  0322 05/29/18  2359 05/29/18  2347   BLOODCX  No growth at less than 24 hours No growth at less than 24 hours  --   --  No growth at 4 days No growth at 4 days   RESPCX   --   --  Light growth (2+) Normal Respiratory Yanet  --   --   --    GRAM STAIN RESULT   --   --  Few (2+) Mixed bacterial morphotypes seen on Gram Stain  Rare (1+) WBCs per low power field  Rare (1+)  Epithelial cells per low power field  --   --   --    MRSA SCREEN CX   --   --   --  No Methicillin Resistant Staphylococcus aureus isolated  --   --          Results from last 7 days  Lab Units 05/29/18  2347   TROPONIN T ng/mL <0.010       Results Review:    I have reviewed the available laboratory results and reviewed the chest imaging personally    ASSESSMENT:   Ac on chronic hypoxic resp failure (On nocturnal o2 only @ baseline)  Ac on chronic hypercapnic resp failure   Parainfluenza URTI   LLL atelectasis/ ? pna  Metabolic encephalopathy   H/o TBI s/p trach - capped usually   Dysphagia s/p PEG   Chronic ruiz - replaced   Routine mgmt of [facility] ventilation    PLAN:  LLL atelectasis with hilar fullness and mucus plugging s/p broncho on 6/1/2018 6/1/2018 trach upsize and bronch   PS then trach collar trial  Risperidal helped with tremor, off med list, will add back  Will c/w mucus clearance techniques - BD/ CHest PT   Pt on bumex with no diagnosis of CHF. ECHO shows normal EF. No DD. RAP 8, RVSP 15.     Full Code   DVT ppx     CCT- 35mins    I have discussed my findings and recommendations with patient and nursing staff.    Aman Van MD  6/3/2018

## 2018-06-03 NOTE — PROGRESS NOTES
[facility]     Inpatient Follow Up Note    Patient Identification:  Name: Brayden Lynn  Age: 57 y.o.  Sex: male  :  1960  MRN: [id number]    Information from:patient     CC: Distention.     History:   Parents note his abdomen is much less distended. Venting quite a bit of air via FMS.     Review of Systems:  Non-verbal            Problem List:  Patient Active Problem List    Diagnosis   • Respiratory failure [J96.90]   • Acute respiratory failure with hypoxia and hypercapnia [J96.01, J96.02]     Overview Note:     Added automatically from request for surgery [id number]       Current Meds:  MAR Reviewed  Scheduled Meds:  albuterol 6 puff Inhalation Q4H - RT   ARIPiprazole 15 mg Per G Tube Daily   aspirin 325 mg Per G Tube Daily   baclofen 10 mg Per G Tube TID   carBAMazepine 100 mg Per G Tube BID   cetirizine 10 mg Per G Tube Daily   digoxin 125 mcg Per G Tube Daily   diltiaZEM 30 mg Per G Tube TID   enoxaparin 40 mg Subcutaneous Q24H   insulin aspart 0-7 Units Subcutaneous Q6H   lansoprazole 30 mg Nasogastric QAM   levETIRAcetam 750 mg Per G Tube Q12H   levothyroxine 50 mcg Per G Tube Daily   risperiDONE 1 mg Oral 4x Daily     Continuous Infusions:  propofol 5-50 mcg/kg/min Last Rate: 10 mcg/kg/min (18 2109)     PRN Meds:.•  acetaminophen **OR** acetaminophen  •  dextrose  •  dextrose  •  fentaNYL citrate (PF)  •  glucagon (human recombinant)  •  ipratropium-albuterol  •  potassium chloride **OR** potassium chloride **OR** potassium chloride  •  sodium chloride  •  sodium chloride  Allergies:  Allergies   Allergen Reactions   • Ciprofloxacin Unknown (See Comments)     Decreases BP per family report     • Contrast Dye Swelling   • Amoxicillin Rash   • Augmentin [Amoxicillin-Pot Clavulanate] Rash       Intake/Output:     Intake/Output Summary (Last 24 hours) at 18 1425  Last data filed at 18 0545   Gross per 24 hour   Intake              393 ml   Output            "   885 ml   Net             -492 ml     New allergies/reactions:  None    Physical Exam:  Vitals:   Temp (24hrs), Av.9 °F (37.2 °C), Min:96.9 °F (36.1 °C), Max:100.1 °F (37.8 °C)    Temp:  [96.9 °F (36.1 °C)-100.1 °F (37.8 °C)] 96.9 °F (36.1 °C)  Heart Rate:  [72-95] 87  Resp:  [20-22] 22  BP: (103-156)/() 133/78  FiO2 (%):  [40 %-100 %] 40 %  /78   Pulse 87   Temp 96.9 °F (36.1 °C) (Oral)   Resp 22   Ht 182.9 cm (72.01\")   Wt 81.7 kg (180 lb 1.9 oz)   SpO2 93%   BMI 24.42 kg/m²     Exam:  NAD  Abdomen is much softer today.         DATA:  Radiology and Labs:   Recent Results (from the past 24 hour(s))   Blood Culture - Blood, Blood, Venous Line    Collection Time: 18  3:12 PM   Result Value Ref Range    Blood Culture No growth at less than 24 hours    Blood Culture - Blood, Blood, Venous Line    Collection Time: 18  3:21 PM   Result Value Ref Range    Blood Culture No growth at less than 24 hours    POC Glucose Once    Collection Time: 18  5:45 PM   Result Value Ref Range    Glucose 121 70 - 130 mg/dL   POC Glucose Once    Collection Time: 18 12:20 AM   Result Value Ref Range    Glucose 116 70 - 130 mg/dL   POC Glucose Once    Collection Time: 18  5:31 AM   Result Value Ref Range    Glucose 117 70 - 130 mg/dL   POC Glucose Once    Collection Time: 18  7:30 AM   Result Value Ref Range    Glucose 123 70 - 130 mg/dL   Basic Metabolic Panel    Collection Time: 18  8:16 AM   Result Value Ref Range    Glucose 117 (H) 65 - 99 mg/dL    BUN 24 (H) 6 - 20 mg/dL    Creatinine 0.47 (L) 0.76 - 1.27 mg/dL    Sodium 142 136 - 145 mmol/L    Potassium 3.3 (L) 3.5 - 5.2 mmol/L    Chloride 100 98 - 107 mmol/L    CO2 31.1 (H) 22.0 - 29.0 mmol/L    Calcium 8.3 (L) 8.6 - 10.5 mg/dL    eGFR Non African Amer >150 >60 mL/min/1.73    BUN/Creatinine Ratio 51.1 (H) 7.0 - 25.0    Anion Gap 10.9 mmol/L   CBC Auto Differential    Collection Time: 18  8:16 AM   Result Value Ref " Range    WBC 11.28 (H) 4.50 - 10.70 10*3/mm3    RBC 3.93 (L) 4.60 - 6.00 10*6/mm3    Hemoglobin 12.4 (L) 13.7 - 17.6 g/dL    Hematocrit 39.1 (L) 40.4 - 52.2 %    MCV 99.5 (H) 79.8 - 96.2 fL    MCH 31.6 27.0 - 32.7 pg    MCHC 31.7 (L) 32.6 - 36.4 g/dL    RDW 14.4 11.5 - 14.5 %    RDW-SD 52.9 37.0 - 54.0 fl    MPV 9.1 6.0 - 12.0 fL    Platelets 110 (L) 140 - 500 10*3/mm3    Neutrophil % 81.4 (H) 42.7 - 76.0 %    Lymphocyte % 11.4 (L) 19.6 - 45.3 %    Monocyte % 6.8 5.0 - 12.0 %    Eosinophil % 0.0 (L) 0.3 - 6.2 %    Basophil % 0.1 0.0 - 1.5 %    Immature Grans % 0.3 0.0 - 0.5 %    Neutrophils, Absolute 9.18 (H) 1.90 - 8.10 10*3/mm3    Lymphocytes, Absolute 1.29 0.90 - 4.80 10*3/mm3    Monocytes, Absolute 0.77 0.20 - 1.20 10*3/mm3    Eosinophils, Absolute 0.00 0.00 - 0.70 10*3/mm3    Basophils, Absolute 0.01 0.00 - 0.20 10*3/mm3    Immature Grans, Absolute 0.03 0.00 - 0.03 10*3/mm3   Magnesium    Collection Time: 06/03/18  8:16 AM   Result Value Ref Range    Magnesium 2.4 1.6 - 2.6 mg/dL   POC Glucose Once    Collection Time: 06/03/18 11:24 AM   Result Value Ref Range    Glucose 115 70 - 130 mg/dL       Assessment:   Problem List:   Principal Problem:    Acute respiratory failure with hypoxia and hypercapnia  Active Problems:    Respiratory failure    Colonic ileus, responding to conservative measures.     Plan:   Continue same. Might be able to remove rectal tube tomorrow.       Massimo Gooden MD  [facility]  6/3/2018

## 2018-06-03 NOTE — PLAN OF CARE
Problem: Patient Care Overview  Goal: Plan of Care Review  Outcome: Ongoing (interventions implemented as appropriate)   06/03/18 0632   OTHER   Outcome Summary Pt VS stable. Tube feeds stopped, due to abdomen distention. KUB this am. Propofol off but pt remains on the vent., unable to wean yesterday.    Plan of Care Review   Progress no change       Problem: Fall Risk (Adult)  Goal: Absence of Fall  Outcome: Ongoing (interventions implemented as appropriate)      Problem: Skin Injury Risk (Adult)  Goal: Skin Health and Integrity  Outcome: Ongoing (interventions implemented as appropriate)      Problem: Infection, Risk/Actual (Adult)  Goal: Infection Prevention/Resolution  Outcome: Ongoing (interventions implemented as appropriate)      Problem: Nutrition, Enteral (Adult)  Goal: Signs and Symptoms of Listed Potential Problems Will be Absent, Minimized or Managed (Nutrition, Enteral)  Outcome: Ongoing (interventions implemented as appropriate)      Problem: Breathing Pattern Ineffective (Adult)  Goal: Effective Oxygenation/Ventilation  Outcome: Ongoing (interventions implemented as appropriate)    Goal: Anxiety/Fear Reduction  Outcome: Ongoing (interventions implemented as appropriate)      Problem: Ventilation, Mechanical Invasive (Adult)  Goal: Signs and Symptoms of Listed Potential Problems Will be Absent, Minimized or Managed (Ventilation, Mechanical Invasive)  Outcome: Ongoing (interventions implemented as appropriate)

## 2018-06-04 LAB
ANION GAP SERPL CALCULATED.3IONS-SCNC: 12 MMOL/L
BACTERIA SPEC AEROBE CULT: NORMAL
BACTERIA SPEC AEROBE CULT: NORMAL
BASOPHILS # BLD AUTO: 0.01 10*3/MM3 (ref 0–0.2)
BASOPHILS NFR BLD AUTO: 0.1 % (ref 0–1.5)
BUN BLD-MCNC: 17 MG/DL (ref 6–20)
BUN/CREAT SERPL: 44.7 (ref 7–25)
CALCIUM SPEC-SCNC: 8.2 MG/DL (ref 8.6–10.5)
CHLORIDE SERPL-SCNC: 101 MMOL/L (ref 98–107)
CO2 SERPL-SCNC: 31 MMOL/L (ref 22–29)
CREAT BLD-MCNC: 0.38 MG/DL (ref 0.76–1.27)
CYTO UR: NORMAL
DEPRECATED RDW RBC AUTO: 50.4 FL (ref 37–54)
EOSINOPHIL # BLD AUTO: 0.02 10*3/MM3 (ref 0–0.7)
EOSINOPHIL NFR BLD AUTO: 0.2 % (ref 0.3–6.2)
ERYTHROCYTE [DISTWIDTH] IN BLOOD BY AUTOMATED COUNT: 14.2 % (ref 11.5–14.5)
GFR SERPL CREATININE-BSD FRML MDRD: >150 ML/MIN/1.73
GLUCOSE BLD-MCNC: 100 MG/DL (ref 65–99)
GLUCOSE BLDC GLUCOMTR-MCNC: 100 MG/DL (ref 70–130)
GLUCOSE BLDC GLUCOMTR-MCNC: 102 MG/DL (ref 70–130)
GLUCOSE BLDC GLUCOMTR-MCNC: 80 MG/DL (ref 70–130)
GLUCOSE BLDC GLUCOMTR-MCNC: 86 MG/DL (ref 70–130)
GLUCOSE BLDC GLUCOMTR-MCNC: 86 MG/DL (ref 70–130)
GLUCOSE BLDC GLUCOMTR-MCNC: 97 MG/DL (ref 70–130)
GLUCOSE BLDC GLUCOMTR-MCNC: 99 MG/DL (ref 70–130)
HCT VFR BLD AUTO: 38.3 % (ref 40.4–52.2)
HGB BLD-MCNC: 12.6 G/DL (ref 13.7–17.6)
IMM GRANULOCYTES # BLD: 0.03 10*3/MM3 (ref 0–0.03)
IMM GRANULOCYTES NFR BLD: 0.3 % (ref 0–0.5)
LAB AP CASE REPORT: NORMAL
LAB AP CLINICAL INFORMATION: NORMAL
LAB AP SPECIAL STAINS: NORMAL
LYMPHOCYTES # BLD AUTO: 1.13 10*3/MM3 (ref 0.9–4.8)
LYMPHOCYTES NFR BLD AUTO: 12.2 % (ref 19.6–45.3)
Lab: NORMAL
MCH RBC QN AUTO: 32.5 PG (ref 27–32.7)
MCHC RBC AUTO-ENTMCNC: 32.9 G/DL (ref 32.6–36.4)
MCV RBC AUTO: 98.7 FL (ref 79.8–96.2)
MONOCYTES # BLD AUTO: 0.76 10*3/MM3 (ref 0.2–1.2)
MONOCYTES NFR BLD AUTO: 8.2 % (ref 5–12)
NEUTROPHILS # BLD AUTO: 7.33 10*3/MM3 (ref 1.9–8.1)
NEUTROPHILS NFR BLD AUTO: 79.3 % (ref 42.7–76)
PATH REPORT.FINAL DX SPEC: NORMAL
PATH REPORT.GROSS SPEC: NORMAL
PLATELET # BLD AUTO: 131 10*3/MM3 (ref 140–500)
PMV BLD AUTO: 8.9 FL (ref 6–12)
POTASSIUM BLD-SCNC: 3.7 MMOL/L (ref 3.5–5.2)
RBC # BLD AUTO: 3.88 10*6/MM3 (ref 4.6–6)
SODIUM BLD-SCNC: 144 MMOL/L (ref 136–145)
WBC NRBC COR # BLD: 9.25 10*3/MM3 (ref 4.5–10.7)

## 2018-06-04 PROCEDURE — 80048 BASIC METABOLIC PNL TOTAL CA: CPT | Performed by: INTERNAL MEDICINE

## 2018-06-04 PROCEDURE — 94799 UNLISTED PULMONARY SVC/PX: CPT

## 2018-06-04 PROCEDURE — 94668 MNPJ CHEST WALL SBSQ: CPT

## 2018-06-04 PROCEDURE — 99232 SBSQ HOSP IP/OBS MODERATE 35: CPT | Performed by: INTERNAL MEDICINE

## 2018-06-04 PROCEDURE — 25010000002 ENOXAPARIN PER 10 MG: Performed by: INTERNAL MEDICINE

## 2018-06-04 PROCEDURE — 85025 COMPLETE CBC W/AUTO DIFF WBC: CPT | Performed by: INTERNAL MEDICINE

## 2018-06-04 PROCEDURE — 82962 GLUCOSE BLOOD TEST: CPT

## 2018-06-04 PROCEDURE — 94003 VENT MGMT INPAT SUBQ DAY: CPT

## 2018-06-04 RX ORDER — LEVETIRACETAM 100 MG/ML
750 SOLUTION ORAL EVERY 12 HOURS SCHEDULED
Status: DISCONTINUED | OUTPATIENT
Start: 2018-06-04 | End: 2018-06-15 | Stop reason: HOSPADM

## 2018-06-04 RX ORDER — VECURONIUM BROMIDE 1 MG/ML
10 INJECTION, POWDER, LYOPHILIZED, FOR SOLUTION INTRAVENOUS ONCE
Status: DISCONTINUED | OUTPATIENT
Start: 2018-06-01 | End: 2018-06-08

## 2018-06-04 RX ORDER — FENTANYL CITRATE 50 UG/ML
100 INJECTION, SOLUTION INTRAMUSCULAR; INTRAVENOUS ONCE
Status: DISCONTINUED | OUTPATIENT
Start: 2018-06-01 | End: 2018-06-10

## 2018-06-04 RX ADMIN — ALBUTEROL SULFATE 6 PUFF: 90 AEROSOL, METERED RESPIRATORY (INHALATION) at 07:12

## 2018-06-04 RX ADMIN — BACLOFEN 10 MG: 10 TABLET ORAL at 08:16

## 2018-06-04 RX ADMIN — BACLOFEN 10 MG: 10 TABLET ORAL at 16:07

## 2018-06-04 RX ADMIN — DILTIAZEM HYDROCHLORIDE 30 MG: 30 TABLET, FILM COATED ORAL at 20:15

## 2018-06-04 RX ADMIN — IPRATROPIUM BROMIDE AND ALBUTEROL SULFATE 3 ML: .5; 3 SOLUTION RESPIRATORY (INHALATION) at 23:36

## 2018-06-04 RX ADMIN — DIGOXIN 125 MCG: 0.12 TABLET ORAL at 12:47

## 2018-06-04 RX ADMIN — CARBAMAZEPINE 100 MG: 100 SUSPENSION ORAL at 08:16

## 2018-06-04 RX ADMIN — CETIRIZINE HYDROCHLORIDE 10 MG: 10 TABLET, FILM COATED ORAL at 08:17

## 2018-06-04 RX ADMIN — DILTIAZEM HYDROCHLORIDE 30 MG: 30 TABLET, FILM COATED ORAL at 16:07

## 2018-06-04 RX ADMIN — ALBUTEROL SULFATE 6 PUFF: 90 AEROSOL, METERED RESPIRATORY (INHALATION) at 03:11

## 2018-06-04 RX ADMIN — RISPERIDONE 1 MG: 1 SOLUTION ORAL at 18:20

## 2018-06-04 RX ADMIN — LANSOPRAZOLE 30 MG: KIT at 06:00

## 2018-06-04 RX ADMIN — IPRATROPIUM BROMIDE AND ALBUTEROL SULFATE 3 ML: .5; 3 SOLUTION RESPIRATORY (INHALATION) at 20:22

## 2018-06-04 RX ADMIN — ALBUTEROL SULFATE 6 PUFF: 90 AEROSOL, METERED RESPIRATORY (INHALATION) at 11:52

## 2018-06-04 RX ADMIN — BACLOFEN 10 MG: 10 TABLET ORAL at 20:15

## 2018-06-04 RX ADMIN — LEVETIRACETAM 750 MG: 500 TABLET, FILM COATED ORAL at 18:20

## 2018-06-04 RX ADMIN — LEVETIRACETAM 750 MG: 500 TABLET, FILM COATED ORAL at 05:57

## 2018-06-04 RX ADMIN — CARBAMAZEPINE 100 MG: 100 SUSPENSION ORAL at 20:15

## 2018-06-04 RX ADMIN — RISPERIDONE 1 MG: 1 SOLUTION ORAL at 13:20

## 2018-06-04 RX ADMIN — LEVOTHYROXINE SODIUM 50 MCG: 50 TABLET ORAL at 08:16

## 2018-06-04 RX ADMIN — RISPERIDONE 1 MG: 1 SOLUTION ORAL at 20:15

## 2018-06-04 RX ADMIN — ASPIRIN 325 MG: 325 TABLET ORAL at 08:16

## 2018-06-04 RX ADMIN — IPRATROPIUM BROMIDE AND ALBUTEROL SULFATE 3 ML: .5; 3 SOLUTION RESPIRATORY (INHALATION) at 16:05

## 2018-06-04 RX ADMIN — ENOXAPARIN SODIUM 40 MG: 100 INJECTION SUBCUTANEOUS at 05:56

## 2018-06-04 RX ADMIN — POTASSIUM CHLORIDE 40 MEQ: 1.5 POWDER, FOR SOLUTION ORAL at 00:34

## 2018-06-04 RX ADMIN — ARIPIPRAZOLE 15 MG: 5 TABLET ORAL at 08:16

## 2018-06-04 RX ADMIN — DILTIAZEM HYDROCHLORIDE 30 MG: 30 TABLET, FILM COATED ORAL at 08:16

## 2018-06-04 NOTE — PLAN OF CARE
Problem: Patient Care Overview  Goal: Plan of Care Review  Outcome: Ongoing (interventions implemented as appropriate)      Problem: Fall Risk (Adult)  Goal: Absence of Fall  Outcome: Ongoing (interventions implemented as appropriate)      Problem: Skin Injury Risk (Adult)  Goal: Skin Health and Integrity  Outcome: Ongoing (interventions implemented as appropriate)      Problem: Infection, Risk/Actual (Adult)  Goal: Infection Prevention/Resolution  Outcome: Ongoing (interventions implemented as appropriate)      Problem: Nutrition, Enteral (Adult)  Goal: Signs and Symptoms of Listed Potential Problems Will be Absent, Minimized or Managed (Nutrition, Enteral)  Outcome: Ongoing (interventions implemented as appropriate)      Problem: Breathing Pattern Ineffective (Adult)  Goal: Effective Oxygenation/Ventilation  Outcome: Ongoing (interventions implemented as appropriate)    Goal: Anxiety/Fear Reduction  Outcome: Ongoing (interventions implemented as appropriate)      Problem: Ventilation, Mechanical Invasive (Adult)  Goal: Signs and Symptoms of Listed Potential Problems Will be Absent, Minimized or Managed (Ventilation, Mechanical Invasive)  Outcome: Ongoing (interventions implemented as appropriate)

## 2018-06-04 NOTE — SIGNIFICANT NOTE
06/04/18 1336   Rehab Time/Intention   Evaluation Not Performed other (see comments)  (Per previous PT note by Marianne Shaw, pt uses chau lift at baseline and is dependent with care. Original PLOF report from nurse and family. Due to limited baseline function, pt is not appropriate for PT at this time. PT will sign off.)   Rehab Treatment   Discipline physical therapist

## 2018-06-04 NOTE — PLAN OF CARE
Problem: Patient Care Overview  Goal: Plan of Care Review  Outcome: Ongoing (interventions implemented as appropriate)   06/04/18 0706   OTHER   Outcome Summary VS stable. Pt on spontaneous all night. BP slightly elevated. Good UOP.       Goal: Individualization and Mutuality  Outcome: Ongoing (interventions implemented as appropriate)    Goal: Discharge Needs Assessment  Outcome: Ongoing (interventions implemented as appropriate)    Goal: Interprofessional Rounds/Family Conf  Outcome: Ongoing (interventions implemented as appropriate)

## 2018-06-04 NOTE — PROGRESS NOTES
[facility]  Inpatient Progress Note    Reason for Follow Up:  Colonic intertia    Subjective     Interval History:   Nursing reports pt stable overnight.  Some gas and liquid outpt from rectal tube    Current Facility-Administered Medications:   •  acetaminophen (TYLENOL) tablet 650 mg, 650 mg, Oral, Q4H PRN, 650 mg at 06/02/18 1607 **OR** acetaminophen (TYLENOL) suppository 650 mg, 650 mg, Rectal, Q4H PRN, Ravi Van MD  •  albuterol (PROVENTIL HFA;VENTOLIN HFA) inhaler 6 puff, 6 puff, Inhalation, Q4H - RT, Juan Blankenship MD, 6 puff at 06/04/18 0712  •  ARIPiprazole (ABILIFY) tablet 15 mg, 15 mg, Per G Tube, Daily, Ed Andrews MD, 15 mg at 06/04/18 0816  •  aspirin tablet 325 mg, 325 mg, Per G Tube, Daily, Ravi Van MD, 325 mg at 06/04/18 0816  •  baclofen (LIORESAL) tablet 10 mg, 10 mg, Per G Tube, TID, Ed Andrews MD, 10 mg at 06/04/18 0816  •  carBAMazepine (TEGretol) 100 MG/5ML suspension 100 mg, 100 mg, Per G Tube, BID, Ravi Van MD, 100 mg at 06/04/18 0816  •  cetirizine (zyrTEC) tablet 10 mg, 10 mg, Per G Tube, Daily, Ravi Van MD, 10 mg at 06/04/18 0817  •  dextrose (D50W) solution 25 g, 25 g, Intravenous, Q15 Min PRN, Ravi Van MD  •  dextrose (GLUTOSE) oral gel 15 g, 15 g, Oral, Q15 Min PRN, Ravi Van MD  •  digoxin (LANOXIN) tablet 125 mcg, 125 mcg, Per G Tube, Daily, Ravi Van MD, 125 mcg at 06/03/18 1259  •  diltiaZEM (CARDIZEM) tablet 30 mg, 30 mg, Per G Tube, TID, Ravi Van MD, 30 mg at 06/04/18 0816  •  enoxaparin (LOVENOX) syringe 40 mg, 40 mg, Subcutaneous, Q24H, Ravi Van MD, 40 mg at 06/04/18 0556  •  fentaNYL citrate (PF) (SUBLIMAZE) injection 100 mcg, 100 mcg, Intravenous, Once, Juan Blankenship MD  •  fentaNYL citrate (PF) (SUBLIMAZE) injection 12.5 mcg, 12.5 mcg, Intravenous, Q2H PRN, Juan Blankenship MD, 12.5 mcg at 06/02/18 0418  •  glucagon (human recombinant) (GLUCAGEN  DIAGNOSTIC) injection 1 mg, 1 mg, Subcutaneous, PRN, Ravi Van MD  •  insulin aspart (novoLOG) injection 0-7 Units, 0-7 Units, Subcutaneous, Q6H, Ravi Van MD, 2 Units at 06/02/18 1144  •  ipratropium-albuterol (DUO-NEB) nebulizer solution 3 mL, 3 mL, Nebulization, Q2H PRN, Ravi Van MD, 3 mL at 05/31/18 1340  •  lansoprazole (FIRST) oral suspension 30 mg, 30 mg, Nasogastric, QAM, Juan Blankenship MD, 30 mg at 06/04/18 0600  •  levETIRAcetam (KEPPRA) tablet 750 mg, 750 mg, Per G Tube, Q12H, Ravi Van MD, 750 mg at 06/04/18 0557  •  levothyroxine (SYNTHROID, LEVOTHROID) tablet 50 mcg, 50 mcg, Per G Tube, Daily, Ravi Van MD, 50 mcg at 06/04/18 0816  •  potassium chloride (MICRO-K) CR capsule 40 mEq, 40 mEq, Oral, PRN **OR** potassium chloride (KLOR-CON) packet 40 mEq, 40 mEq, Oral, PRN, 40 mEq at 06/04/18 0034 **OR** potassium chloride 10 mEq in 100 mL IVPB, 10 mEq, Intravenous, Q1H PRN, Ravi Van MD  •  propofol (DIPRIVAN) infusion 10 mg/mL 100 mL, 5-50 mcg/kg/min, Intravenous, Titrated, Juan Blankenship MD, Last Rate: 4.93 mL/hr at 06/01/18 2323, 10 mcg/kg/min at 06/01/18 2323  •  risperiDONE (risperDAL) 1 MG/ML oral solution 1 mg, 1 mg, Oral, 4x Daily, Aman Van MD, 1 mg at 06/03/18 2125  •  sodium chloride 0.9 % flush 1-10 mL, 1-10 mL, Intravenous, PRN, Ravi Van MD  •  sodium chloride 0.9 % flush 10 mL, 10 mL, Intravenous, PRN, Thomas Snow MD  •  vecuronium (NORCURON) injection 10 mg, 10 mg, Intravenous, Once, Juan Blankenship MD  Review of Systems:    Review of systems could not be obtained due to  patient nonverbal.    Objective     Vital Signs  Temp:  [96.9 °F (36.1 °C)-99.6 °F (37.6 °C)] 97.2 °F (36.2 °C)  Heart Rate:  [77-98] 98  Resp:  [19-22] 21  BP: (133-168)/(58-99) 138/93  FiO2 (%):  [40 %-100 %] 40 %  Body mass index is 24.06 kg/m².    Intake/Output Summary (Last 24 hours) at 06/04/18 1015  Last data filed at 06/04/18 9818    Gross per 24 hour   Intake              657 ml   Output              925 ml   Net             -268 ml     No intake/output data recorded.     Physical Exam:   General: patient awake,    Cardiovascular: regular rhythm and rate, no murmurs auscultated   Pulm: clear to auscultation bilaterally, regular and unlabored   Abdomen: soft, mild tympany to percussion, G tube in place   Rectal: rectal tube in place, some austen-rectal excoriations   Extremities: no rash or edema        Results Review:     I reviewed the patient's new clinical results.  I reviewed the patient's new imaging results and agree with the interpretation.      Results from last 7 days  Lab Units 06/04/18  0743 06/03/18  0816 06/02/18  0529   WBC 10*3/mm3 9.25 11.28* 11.42*   HEMOGLOBIN g/dL 12.6* 12.4* 13.4*   HEMATOCRIT % 38.3* 39.1* 41.9   PLATELETS 10*3/mm3 131* 110* 121*       Results from last 7 days  Lab Units 06/04/18  0743 06/03/18  0816 06/02/18  0529  05/30/18  0413 05/29/18  2347   SODIUM mmol/L 144 142 138  < > 141 139   POTASSIUM mmol/L 3.7 3.3* 3.1*  < > 4.1 4.4   CHLORIDE mmol/L 101 100 93*  < > 101 97*   CO2 mmol/L 31.0* 31.1* 35.6*  < > 31.4* 32.8*   BUN mg/dL 17 24* 31*  < > 17 16   CREATININE mg/dL 0.38* 0.47* 0.44*  < > 0.47* 0.48*   CALCIUM mg/dL 8.2* 8.3* 8.8  < > 7.9* 8.7   BILIRUBIN mg/dL  --   --   --   --  0.2 0.3   ALK PHOS U/L  --   --   --   --  50 58   ALT (SGPT) U/L  --   --   --   --  20 26   AST (SGOT) U/L  --   --   --   --  14 19   GLUCOSE mg/dL 100* 117* 146*  < > 112* 196*   < > = values in this interval not displayed.      No results found for: LIPASE    KUB from 6/3 reviewed - agree with interpretation    Assessment/Plan   Assessment:   1.  Colonic ileus - stable per imaging, improved per physical exam  2.  Acute hypoxic respiratory failure  3.  Dysphagia with feeding tube    Plan:   I would keep FMS in place for now and continue NPO status  Will repeat KUB in am  Give dose of Miralax per G tube  Avoid narcotics as  much as possible    I discussed the patients findings and my recommendations with nursing staff.         Linus Perkins M.D.  [facility]  [address] [place] [address]  Office: [phone]

## 2018-06-04 NOTE — PROGRESS NOTES
Aman Van MD                          [phone]      Patient ID:    Name:  Brayden Lynn    MRN:  [id number]    1960   57 y.o.  male            Patient Care Team:  Guillermo Do MD as PCP - General (Internal Medicine)      Subjective:  Bronch and trach Bedside 2018 Trach upsize to shiley cuff size 6.  abd distension yesterday with ct showing ileus.  More interactive today  Mother and father at bedside agree with above.    Objective     Vital Signs past 24hrs    BP range: BP: (133-168)/(58-99) 139/72  Pulse range: Heart Rate:  [72-92] 86  Resp rate range: Resp:  [19-22] 21  Temp range: Temp (24hrs), Av.2 °F (36.8 °C), Min:96.9 °F (36.1 °C), Max:99.6 °F (37.6 °C)      Device (Oxygen Therapy): ventilator FiO2 (%): 40 %     80.5 kg (177 lb 7.5 oz); Body mass index is 24.06 kg/m².      Intake/Output Summary (Last 24 hours) at 18 0815  Last data filed at 18 0628   Gross per 24 hour   Intake              657 ml   Output              925 ml   Net             -268 ml     FiO2 (%):  [40 %-100 %] 40 %  S RR:  [20] 20  PEEP/CPAP (cm H2O):  [5 cm H20] 5 cm H20  HI SUP:  [8 cm H20] 8 cm H20  MAP (cm H2O):  [7.6-8] 7.6    Exam:    GEN:  Awake but drowsy male, minimally interactive (baseline)  EYES:   EOM-i, anicteric sclera bilat  ENT:    External ears/nose normal, OP dry  NECK:  Supple, Size 6 shiley suture, cdi, no bleeding , No JVD or cervical lymphadenopthy  LUNGS: Bilateral breath sounds diminished air entry at bilateral bases   CV:  Regular rate and rhythm, No murmur  ABD:  Non tender, mild distension but soft, no palpable liver or masses, PEG + +BS rectal tube  EXT:  No cyanosis or clubbing, No peripheral/sacral edema fine tremor intermittently or right UE    Scheduled meds:      albuterol 6 puff Inhalation Q4H - RT   ARIPiprazole 15 mg Per G Tube Daily   aspirin 325 mg Per G Tube Daily   baclofen 10 mg Per G Tube TID   carBAMazepine 100 mg Per G Tube  BID   cetirizine 10 mg Per G Tube Daily   digoxin 125 mcg Per G Tube Daily   diltiaZEM 30 mg Per G Tube TID   enoxaparin 40 mg Subcutaneous Q24H   insulin aspart 0-7 Units Subcutaneous Q6H   lansoprazole 30 mg Nasogastric QAM   levETIRAcetam 750 mg Per G Tube Q12H   levothyroxine 50 mcg Per G Tube Daily   risperiDONE 1 mg Oral 4x Daily       IV meds:                          propofol 5-50 mcg/kg/min Last Rate: 10 mcg/kg/min (06/01/18 9247)       Data Review:        Results from last 7 days  Lab Units 06/03/18  0816 06/02/18  0529 06/01/18  0441  05/30/18  0413 05/29/18  2347   SODIUM mmol/L 142 138 139  < > 141 139   POTASSIUM mmol/L 3.3* 3.1* 3.8  < > 4.1 4.4   CHLORIDE mmol/L 100 93* 95*  < > 101 97*   CO2 mmol/L 31.1* 35.6* 36.9*  < > 31.4* 32.8*   BUN mg/dL 24* 31* 21*  < > 17 16   CREATININE mg/dL 0.47* 0.44* 0.45*  < > 0.47* 0.48*   CALCIUM mg/dL 8.3* 8.8 8.1*  < > 7.9* 8.7   BILIRUBIN mg/dL  --   --   --   --  0.2 0.3   ALK PHOS U/L  --   --   --   --  50 58   ALT (SGPT) U/L  --   --   --   --  20 26   AST (SGOT) U/L  --   --   --   --  14 19   GLUCOSE mg/dL 117* 146* 120*  < > 112* 196*   WBC 10*3/mm3 11.28* 11.42* 8.02  < > 5.96 6.02   HEMOGLOBIN g/dL 12.4* 13.4* 13.7  < > 12.7* 14.8   PLATELETS 10*3/mm3 110* 121* 111*  < > 120* 127*   PROBNP pg/mL  --   --   --   --   --  182.9   PROCALCITONIN ng/mL  --  0.08*  --   --  0.06*  --    < > = values in this interval not displayed.    Lab Results   Component Value Date    CALCIUM 8.3 (L) 06/03/2018         Results from last 7 days  Lab Units 06/02/18  1521 06/02/18  1512 06/01/18  0958 05/30/18  0336 05/30/18  0322 05/29/18  6329 05/29/18  9301   BLOODCX  No growth at 24 hours No growth at 24 hours  --   --   --  No growth at 5 days No growth at 5 days   RESPCX   --   --   --  Light growth (2+) Normal Respiratory Yanet  --   --   --    GRAM STAIN RESULT   --   --  No WBCs or organisms seen Few (2+) Mixed bacterial morphotypes seen on Gram Stain  Rare (1+) WBCs  per low power field  Rare (1+) Epithelial cells per low power field  --   --   --    MRSA SCREEN CX   --   --   --   --  No Methicillin Resistant Staphylococcus aureus isolated  --   --          Results from last 7 days  Lab Units 05/29/18  2347   TROPONIN T ng/mL <0.010       Results Review:    I have reviewed the available laboratory results and reviewed the chest imaging personally    ASSESSMENT:   Ac on chronic hypoxic resp failure (On nocturnal o2 only @ baseline)  Ac on chronic hypercapnic resp failure   Parainfluenza URTI   LLL atelectasis/ ? pna  Metabolic encephalopathy   H/o TBI s/p trach - capped usually   Dysphagia s/p PEG   Chronic ruiz - replaced   Routine mgmt of mech ventilation  Ileus improving    PLAN:  LLL atelectasis with hilar fullness and mucus plugging s/p broncho on 6/1/2018 6/1/2018 trach upsize and bronch   Trach collar trial  Will c/w mucus clearance techniques - BD/ CHest PT   Pt on bumex with no diagnosis of CHF. ECHO shows normal EF. No DD. RAP 8, RVSP 15.     Full Code   DVT ppx     CCT- 35mins    I have discussed my findings and recommendations with patient family and nursing staff.    Aman Van MD  6/4/2018

## 2018-06-04 NOTE — SIGNIFICANT NOTE
06/04/18 1432   Rehab Time/Intention   Evaluation Not Performed other (see comments)  (noted PT notes states pt using chau lift PTA at home. pt was dep prior w. all care at home. no skilled OT needs based on PLOF. OT signing off. 1433)   Rehab Treatment   Discipline occupational therapist

## 2018-06-05 ENCOUNTER — APPOINTMENT (OUTPATIENT)
Dept: GENERAL RADIOLOGY | Facility: HOSPITAL | Age: 58
End: 2018-06-05

## 2018-06-05 LAB
ANION GAP SERPL CALCULATED.3IONS-SCNC: 14.4 MMOL/L
ARTERIAL PATENCY WRIST A: POSITIVE
ATMOSPHERIC PRESS: 751.7 MMHG
BASE EXCESS BLDA CALC-SCNC: 6.5 MMOL/L (ref 0–2)
BASOPHILS # BLD AUTO: 0.01 10*3/MM3 (ref 0–0.2)
BASOPHILS NFR BLD AUTO: 0.1 % (ref 0–1.5)
BDY SITE: ABNORMAL
BUN BLD-MCNC: 17 MG/DL (ref 6–20)
BUN/CREAT SERPL: 39.5 (ref 7–25)
CALCIUM SPEC-SCNC: 8.3 MG/DL (ref 8.6–10.5)
CHLORIDE SERPL-SCNC: 99 MMOL/L (ref 98–107)
CO2 SERPL-SCNC: 28.6 MMOL/L (ref 22–29)
CREAT BLD-MCNC: 0.43 MG/DL (ref 0.76–1.27)
DEPRECATED RDW RBC AUTO: 53 FL (ref 37–54)
EOSINOPHIL # BLD AUTO: 0.09 10*3/MM3 (ref 0–0.7)
EOSINOPHIL NFR BLD AUTO: 1 % (ref 0.3–6.2)
ERYTHROCYTE [DISTWIDTH] IN BLOOD BY AUTOMATED COUNT: 14.2 % (ref 11.5–14.5)
GFR SERPL CREATININE-BSD FRML MDRD: >150 ML/MIN/1.73
GLUCOSE BLD-MCNC: 102 MG/DL (ref 65–99)
GLUCOSE BLDC GLUCOMTR-MCNC: 112 MG/DL (ref 70–130)
GLUCOSE BLDC GLUCOMTR-MCNC: 90 MG/DL (ref 70–130)
GLUCOSE BLDC GLUCOMTR-MCNC: 97 MG/DL (ref 70–130)
HCO3 BLDA-SCNC: 34.7 MMOL/L (ref 22–28)
HCT VFR BLD AUTO: 38.9 % (ref 40.4–52.2)
HGB BLD-MCNC: 12.2 G/DL (ref 13.7–17.6)
HOROWITZ INDEX BLD+IHG-RTO: 50 %
IMM GRANULOCYTES # BLD: 0.03 10*3/MM3 (ref 0–0.03)
IMM GRANULOCYTES NFR BLD: 0.3 % (ref 0–0.5)
LYMPHOCYTES # BLD AUTO: 0.7 10*3/MM3 (ref 0.9–4.8)
LYMPHOCYTES NFR BLD AUTO: 7.5 % (ref 19.6–45.3)
MCH RBC QN AUTO: 31.9 PG (ref 27–32.7)
MCHC RBC AUTO-ENTMCNC: 31.4 G/DL (ref 32.6–36.4)
MCV RBC AUTO: 101.6 FL (ref 79.8–96.2)
MODALITY: ABNORMAL
MONOCYTES # BLD AUTO: 0.76 10*3/MM3 (ref 0.2–1.2)
MONOCYTES NFR BLD AUTO: 8.1 % (ref 5–12)
NEUTROPHILS # BLD AUTO: 7.78 10*3/MM3 (ref 1.9–8.1)
NEUTROPHILS NFR BLD AUTO: 83 % (ref 42.7–76)
O2 A-A PPRESDIFF RESPIRATORY: 0.2 MMHG
PCO2 BLDA: 67.1 MM HG (ref 35–45)
PH BLDA: 7.32 PH UNITS (ref 7.35–7.45)
PLATELET # BLD AUTO: 129 10*3/MM3 (ref 140–500)
PMV BLD AUTO: 9.2 FL (ref 6–12)
PO2 BLDA: 61.5 MM HG (ref 80–100)
POTASSIUM BLD-SCNC: 3.5 MMOL/L (ref 3.5–5.2)
POTASSIUM BLD-SCNC: 4.2 MMOL/L (ref 3.5–5.2)
RBC # BLD AUTO: 3.83 10*6/MM3 (ref 4.6–6)
SAO2 % BLDCOA: 88 % (ref 92–99)
SODIUM BLD-SCNC: 142 MMOL/L (ref 136–145)
TOTAL RATE: 19 BREATHS/MINUTE
WBC NRBC COR # BLD: 9.37 10*3/MM3 (ref 4.5–10.7)

## 2018-06-05 PROCEDURE — 74018 RADEX ABDOMEN 1 VIEW: CPT

## 2018-06-05 PROCEDURE — 25010000002 CEFTRIAXONE PER 250 MG: Performed by: INTERNAL MEDICINE

## 2018-06-05 PROCEDURE — 82803 BLOOD GASES ANY COMBINATION: CPT

## 2018-06-05 PROCEDURE — 25010000002 ENOXAPARIN PER 10 MG: Performed by: INTERNAL MEDICINE

## 2018-06-05 PROCEDURE — 94799 UNLISTED PULMONARY SVC/PX: CPT

## 2018-06-05 PROCEDURE — 85025 COMPLETE CBC W/AUTO DIFF WBC: CPT | Performed by: INTERNAL MEDICINE

## 2018-06-05 PROCEDURE — 36600 WITHDRAWAL OF ARTERIAL BLOOD: CPT

## 2018-06-05 PROCEDURE — 94668 MNPJ CHEST WALL SBSQ: CPT

## 2018-06-05 PROCEDURE — 80048 BASIC METABOLIC PNL TOTAL CA: CPT | Performed by: INTERNAL MEDICINE

## 2018-06-05 PROCEDURE — 94003 VENT MGMT INPAT SUBQ DAY: CPT

## 2018-06-05 PROCEDURE — 84132 ASSAY OF SERUM POTASSIUM: CPT | Performed by: INTERNAL MEDICINE

## 2018-06-05 PROCEDURE — 99232 SBSQ HOSP IP/OBS MODERATE 35: CPT | Performed by: INTERNAL MEDICINE

## 2018-06-05 PROCEDURE — 82962 GLUCOSE BLOOD TEST: CPT

## 2018-06-05 RX ORDER — CEFTRIAXONE SODIUM 1 G/50ML
1 INJECTION, SOLUTION INTRAVENOUS EVERY 24 HOURS
Status: COMPLETED | OUTPATIENT
Start: 2018-06-05 | End: 2018-06-11

## 2018-06-05 RX ADMIN — LANSOPRAZOLE 30 MG: KIT at 06:58

## 2018-06-05 RX ADMIN — POTASSIUM CHLORIDE 40 MEQ: 1.5 POWDER, FOR SOLUTION ORAL at 06:58

## 2018-06-05 RX ADMIN — LEVETIRACETAM 750 MG: 100 SOLUTION ORAL at 05:05

## 2018-06-05 RX ADMIN — ASPIRIN 325 MG: 325 TABLET ORAL at 10:00

## 2018-06-05 RX ADMIN — CARBAMAZEPINE 100 MG: 100 SUSPENSION ORAL at 20:04

## 2018-06-05 RX ADMIN — RISPERIDONE 1 MG: 1 SOLUTION ORAL at 20:04

## 2018-06-05 RX ADMIN — ALBUTEROL SULFATE 6 PUFF: 90 AEROSOL, METERED RESPIRATORY (INHALATION) at 08:32

## 2018-06-05 RX ADMIN — ARIPIPRAZOLE 15 MG: 5 TABLET ORAL at 10:00

## 2018-06-05 RX ADMIN — ALBUTEROL SULFATE 6 PUFF: 90 AEROSOL, METERED RESPIRATORY (INHALATION) at 11:46

## 2018-06-05 RX ADMIN — RISPERIDONE 1 MG: 1 SOLUTION ORAL at 10:00

## 2018-06-05 RX ADMIN — CARBAMAZEPINE 100 MG: 100 SUSPENSION ORAL at 10:00

## 2018-06-05 RX ADMIN — IPRATROPIUM BROMIDE AND ALBUTEROL SULFATE 3 ML: .5; 3 SOLUTION RESPIRATORY (INHALATION) at 16:01

## 2018-06-05 RX ADMIN — ALBUTEROL SULFATE 6 PUFF: 90 AEROSOL, METERED RESPIRATORY (INHALATION) at 03:39

## 2018-06-05 RX ADMIN — BACLOFEN 10 MG: 10 TABLET ORAL at 15:36

## 2018-06-05 RX ADMIN — LEVETIRACETAM 750 MG: 100 SOLUTION ORAL at 17:31

## 2018-06-05 RX ADMIN — DILTIAZEM HYDROCHLORIDE 30 MG: 30 TABLET, FILM COATED ORAL at 10:00

## 2018-06-05 RX ADMIN — ENOXAPARIN SODIUM 40 MG: 100 INJECTION SUBCUTANEOUS at 05:04

## 2018-06-05 RX ADMIN — IPRATROPIUM BROMIDE AND ALBUTEROL SULFATE 3 ML: .5; 3 SOLUTION RESPIRATORY (INHALATION) at 20:07

## 2018-06-05 RX ADMIN — BACLOFEN 10 MG: 10 TABLET ORAL at 10:00

## 2018-06-05 RX ADMIN — CETIRIZINE HYDROCHLORIDE 10 MG: 10 TABLET, FILM COATED ORAL at 10:00

## 2018-06-05 RX ADMIN — CEFTRIAXONE SODIUM 1 G: 1 INJECTION, SOLUTION INTRAVENOUS at 12:33

## 2018-06-05 RX ADMIN — RISPERIDONE 1 MG: 1 SOLUTION ORAL at 17:34

## 2018-06-05 RX ADMIN — DILTIAZEM HYDROCHLORIDE 30 MG: 30 TABLET, FILM COATED ORAL at 15:37

## 2018-06-05 RX ADMIN — RISPERIDONE 1 MG: 1 SOLUTION ORAL at 12:33

## 2018-06-05 RX ADMIN — DIGOXIN 125 MCG: 0.12 TABLET ORAL at 12:32

## 2018-06-05 RX ADMIN — LEVOTHYROXINE SODIUM 50 MCG: 50 TABLET ORAL at 10:00

## 2018-06-05 RX ADMIN — BACLOFEN 10 MG: 10 TABLET ORAL at 20:04

## 2018-06-05 RX ADMIN — POTASSIUM CHLORIDE 40 MEQ: 1.5 POWDER, FOR SOLUTION ORAL at 05:47

## 2018-06-05 NOTE — PLAN OF CARE
Problem: Patient Care Overview  Goal: Plan of Care Review  Outcome: Ongoing (interventions implemented as appropriate)   06/05/18 0650   OTHER   Outcome Summary Pt increasingly more lethargic at start of shift. ABG per MD. CO2 elevated at 67, so pt was placed back on vent. Now more alert. Continues to have thick yellow secretions. Otherwise VSS. Potassium replaced. UOP adequate. FMS remains in place. There is some skin breakdown around FMS tube. Frequent cleansing and cream applied with Vaseline gauze wrapped around FMS tube. This seems to help breakdown.      Goal: Individualization and Mutuality  Outcome: Ongoing (interventions implemented as appropriate)    Goal: Discharge Needs Assessment  Outcome: Ongoing (interventions implemented as appropriate)    Goal: Interprofessional Rounds/Family Conf  Outcome: Ongoing (interventions implemented as appropriate)      Problem: Fall Risk (Adult)  Goal: Absence of Fall  Outcome: Ongoing (interventions implemented as appropriate)      Problem: Skin Injury Risk (Adult)  Goal: Skin Health and Integrity  Outcome: Ongoing (interventions implemented as appropriate)      Problem: Infection, Risk/Actual (Adult)  Goal: Infection Prevention/Resolution  Outcome: Ongoing (interventions implemented as appropriate)      Problem: Nutrition, Enteral (Adult)  Goal: Signs and Symptoms of Listed Potential Problems Will be Absent, Minimized or Managed (Nutrition, Enteral)  Outcome: Ongoing (interventions implemented as appropriate)      Problem: Breathing Pattern Ineffective (Adult)  Goal: Identify Related Risk Factors and Signs and Symptoms  Outcome: Ongoing (interventions implemented as appropriate)    Goal: Effective Oxygenation/Ventilation  Outcome: Ongoing (interventions implemented as appropriate)    Goal: Anxiety/Fear Reduction  Outcome: Ongoing (interventions implemented as appropriate)      Problem: Ventilation, Mechanical Invasive (Adult)  Goal: Signs and Symptoms of Listed Potential  Problems Will be Absent, Minimized or Managed (Ventilation, Mechanical Invasive)  Outcome: Ongoing (interventions implemented as appropriate)

## 2018-06-05 NOTE — PROGRESS NOTES
[facility]  Inpatient Progress Note    Reason for Follow Up:  Colonic intertia    Subjective     Interval History:   Nursing reports no new issues.     Current Facility-Administered Medications:   •  acetaminophen (TYLENOL) tablet 650 mg, 650 mg, Oral, Q4H PRN, 650 mg at 06/02/18 1607 **OR** acetaminophen (TYLENOL) suppository 650 mg, 650 mg, Rectal, Q4H PRN, Ravi Van MD  •  albuterol (PROVENTIL HFA;VENTOLIN HFA) inhaler 6 puff, 6 puff, Inhalation, Q4H - RT, Juan Blankenship MD, 6 puff at 06/05/18 0832  •  ARIPiprazole (ABILIFY) tablet 15 mg, 15 mg, Per G Tube, Daily, Ed Andrews MD, 15 mg at 06/04/18 0816  •  aspirin tablet 325 mg, 325 mg, Per G Tube, Daily, Ravi Van MD, 325 mg at 06/04/18 0816  •  baclofen (LIORESAL) tablet 10 mg, 10 mg, Per G Tube, TID, Ed Andrews MD, 10 mg at 06/04/18 2015  •  carBAMazepine (TEGretol) 100 MG/5ML suspension 100 mg, 100 mg, Per G Tube, BID, Ravi Van MD, 100 mg at 06/04/18 2015  •  cetirizine (zyrTEC) tablet 10 mg, 10 mg, Per G Tube, Daily, Ravi Van MD, 10 mg at 06/04/18 0817  •  dextrose (D50W) solution 25 g, 25 g, Intravenous, Q15 Min PRN, Ravi Van MD  •  dextrose (GLUTOSE) oral gel 15 g, 15 g, Oral, Q15 Min PRN, Ravi Van MD  •  digoxin (LANOXIN) tablet 125 mcg, 125 mcg, Per G Tube, Daily, Ravi Van MD, 125 mcg at 06/04/18 1247  •  diltiaZEM (CARDIZEM) tablet 30 mg, 30 mg, Per G Tube, TID, Ravi Van MD, 30 mg at 06/04/18 2015  •  enoxaparin (LOVENOX) syringe 40 mg, 40 mg, Subcutaneous, Q24H, Ravi Van MD, 40 mg at 06/05/18 0504  •  fentaNYL citrate (PF) (SUBLIMAZE) injection 100 mcg, 100 mcg, Intravenous, Once, Juan Blankenship MD  •  fentaNYL citrate (PF) (SUBLIMAZE) injection 12.5 mcg, 12.5 mcg, Intravenous, Q2H PRN, Juan Blankenship MD, 12.5 mcg at 06/02/18 4088  •  glucagon (human recombinant) (GLUCAGEN DIAGNOSTIC) injection 1 mg, 1 mg, Subcutaneous, PRN,  Ravi Van MD  •  insulin aspart (novoLOG) injection 0-7 Units, 0-7 Units, Subcutaneous, Q6H, Ravi Van MD, 2 Units at 06/02/18 1144  •  ipratropium-albuterol (DUO-NEB) nebulizer solution 3 mL, 3 mL, Nebulization, Q2H PRN, Ravi Van MD, 3 mL at 06/04/18 2336  •  lansoprazole (FIRST) oral suspension 30 mg, 30 mg, Nasogastric, QAM, Juan Blankenship MD, 30 mg at 06/05/18 0658  •  levETIRAcetam (KEPPRA) 100 MG/ML solution 750 mg, 750 mg, Per G Tube, Q12H, Ravi Van MD, 750 mg at 06/05/18 0505  •  levothyroxine (SYNTHROID, LEVOTHROID) tablet 50 mcg, 50 mcg, Per G Tube, Daily, Ravi Van MD, 50 mcg at 06/04/18 0816  •  potassium chloride (MICRO-K) CR capsule 40 mEq, 40 mEq, Oral, PRN **OR** potassium chloride (KLOR-CON) packet 40 mEq, 40 mEq, Oral, PRN, 40 mEq at 06/05/18 0658 **OR** potassium chloride 10 mEq in 100 mL IVPB, 10 mEq, Intravenous, Q1H PRN, Ravi Van MD  •  propofol (DIPRIVAN) infusion 10 mg/mL 100 mL, 5-50 mcg/kg/min, Intravenous, Titrated, Juan Blankenship MD, Last Rate: 4.93 mL/hr at 06/01/18 2323, 10 mcg/kg/min at 06/01/18 2323  •  risperiDONE (risperDAL) 1 MG/ML oral solution 1 mg, 1 mg, Oral, 4x Daily, Aman Van MD, 1 mg at 06/04/18 2015  •  sodium chloride 0.9 % flush 1-10 mL, 1-10 mL, Intravenous, PRN, Ravi Van MD  •  sodium chloride 0.9 % flush 10 mL, 10 mL, Intravenous, PRN, Thomas Snow MD  •  vecuronium (NORCURON) injection 10 mg, 10 mg, Intravenous, Once, Juan Blankenship MD  Review of Systems:    Review of systems could not be obtained due to  patient nonverbal.    Objective     Vital Signs  Temp:  [98 °F (36.7 °C)-98.6 °F (37 °C)] 98 °F (36.7 °C)  Heart Rate:  [59-80] 76  Resp:  [16-18] 17  BP: (100-160)/(54-96) 113/59  FiO2 (%):  [40 %] 40 %  Body mass index is 23.94 kg/m².    Intake/Output Summary (Last 24 hours) at 06/05/18 0845  Last data filed at 06/05/18 0510   Gross per 24 hour   Intake              630 ml    Output              775 ml   Net             -145 ml     No intake/output data recorded.     Physical Exam:   General: patient awake,    Cardiovascular: regular rhythm and rate, no murmurs auscultated   Pulm: clear to auscultation bilaterally, regular and unlabored   Abdomen: soft, G tube in place   Rectal: rectal tube in place, some austen-rectal excoriations   Extremities: no rash or edema        Results Review:     I reviewed the patient's new clinical results.  I reviewed the patient's new imaging results and agree with the interpretation.      Results from last 7 days  Lab Units 06/05/18 0449 06/04/18  0743 06/03/18  0816   WBC 10*3/mm3 9.37 9.25 11.28*   HEMOGLOBIN g/dL 12.2* 12.6* 12.4*   HEMATOCRIT % 38.9* 38.3* 39.1*   PLATELETS 10*3/mm3 129* 131* 110*       Results from last 7 days  Lab Units 06/05/18 0449 06/04/18  0743 06/03/18  0816  05/30/18  0413 05/29/18  2347   SODIUM mmol/L 142 144 142  < > 141 139   POTASSIUM mmol/L 3.5 3.7 3.3*  < > 4.1 4.4   CHLORIDE mmol/L 99 101 100  < > 101 97*   CO2 mmol/L 28.6 31.0* 31.1*  < > 31.4* 32.8*   BUN mg/dL 17 17 24*  < > 17 16   CREATININE mg/dL 0.43* 0.38* 0.47*  < > 0.47* 0.48*   CALCIUM mg/dL 8.3* 8.2* 8.3*  < > 7.9* 8.7   BILIRUBIN mg/dL  --   --   --   --  0.2 0.3   ALK PHOS U/L  --   --   --   --  50 58   ALT (SGPT) U/L  --   --   --   --  20 26   AST (SGOT) U/L  --   --   --   --  14 19   GLUCOSE mg/dL 102* 100* 117*  < > 112* 196*   < > = values in this interval not displayed.      No results found for: LIPASE    KUB from 6/3 reviewed - agree with interpretation    Assessment/Plan   Assessment:   1.  Colonic ileus - resolved per imaging  2.  Acute hypoxic respiratory failure  3.  Dysphagia with feeding tube    Plan:   DC FMS  Restart low rate TFs and advance to goal  Avoid narcotics as much as possible    I discussed the patients findings and my recommendations with nursing staff.         Linus Perkins M.D.  [facility]  [facility] [place] [address]  Office: [phone]

## 2018-06-05 NOTE — PROGRESS NOTES
Aman Van MD                          [phone]      Patient ID:    Name:  Brayden Lynn    MRN:  [id number]    1960   57 y.o.  male            Patient Care Team:  Guillermo Do MD as PCP - General (Internal Medicine)      Subjective:  Bronch and trach Bedside 2018 Trach upsize to shiley cuff size 6.  abd distension yesterday with ct showing ileus.  Tf restarted this am,  Family feels overall status improving still.  Seems more alert      Objective     Vital Signs past 24hrs    BP range: BP: (100-160)/(45-96) 128/69  Pulse range: Heart Rate:  [59-80] 78  Resp rate range: Resp:  [16-18] 17  Temp range: Temp (24hrs), Av.3 °F (36.8 °C), Min:98 °F (36.7 °C), Max:98.6 °F (37 °C)      Device (Oxygen Therapy): ventilator FiO2 (%): 40 %     80.1 kg (176 lb 9.4 oz); Body mass index is 23.94 kg/m².      Intake/Output Summary (Last 24 hours) at 18 0930  Last data filed at 18 0510   Gross per 24 hour   Intake              630 ml   Output              775 ml   Net             -145 ml     FiO2 (%):  [40 %] 40 %  S RR:  [14-20] 14  PEEP/CPAP (cm H2O):  [5 cm H20] 5 cm H20  IL SUP:  [8 cm H20] 8 cm H20  MAP (cm H2O):  [8-13] 8    Exam:    GEN:  Awake male, minimally interactive (baseline)  EYES:   EOM-i, anicteric sclera bilat  ENT:    External ears/nose normal, OP dry  NECK:  Supple, Size 6 shiley suture, cdi, no bleeding , No JVD or cervical lymphadenopthy  LUNGS: Bilateral breath sounds diminished air entry at bilateral bases   CV:  Regular rate and rhythm, No murmur  ABD:  Non tender, soft, no palpable liver or masses, PEG + +BS rectal tube  EXT:  No cyanosis or clubbing, +trace ble edema fine tremor intermittently or right UE    Scheduled meds:      albuterol 6 puff Inhalation Q4H - RT   ARIPiprazole 15 mg Per G Tube Daily   aspirin 325 mg Per G Tube Daily   baclofen 10 mg Per G Tube TID   carBAMazepine 100 mg Per G Tube BID   cetirizine 10 mg Per G Tube  Daily   digoxin 125 mcg Per G Tube Daily   diltiaZEM 30 mg Per G Tube TID   enoxaparin 40 mg Subcutaneous Q24H   fentaNYL citrate (PF) 100 mcg Intravenous Once   insulin aspart 0-7 Units Subcutaneous Q6H   lansoprazole 30 mg Nasogastric QAM   levETIRAcetam 750 mg Per G Tube Q12H   levothyroxine 50 mcg Per G Tube Daily   risperiDONE 1 mg Oral 4x Daily   vecuronium 10 mg Intravenous Once       IV meds:                          propofol 5-50 mcg/kg/min Last Rate: 10 mcg/kg/min (06/01/18 8019)       Data Review:        Results from last 7 days  Lab Units 06/05/18  0449 06/04/18  0743 06/03/18  0816 06/02/18  0529  05/30/18  0413 05/29/18  2347   SODIUM mmol/L 142 144 142 138  < > 141 139   POTASSIUM mmol/L 3.5 3.7 3.3* 3.1*  < > 4.1 4.4   CHLORIDE mmol/L 99 101 100 93*  < > 101 97*   CO2 mmol/L 28.6 31.0* 31.1* 35.6*  < > 31.4* 32.8*   BUN mg/dL 17 17 24* 31*  < > 17 16   CREATININE mg/dL 0.43* 0.38* 0.47* 0.44*  < > 0.47* 0.48*   CALCIUM mg/dL 8.3* 8.2* 8.3* 8.8  < > 7.9* 8.7   BILIRUBIN mg/dL  --   --   --   --   --  0.2 0.3   ALK PHOS U/L  --   --   --   --   --  50 58   ALT (SGPT) U/L  --   --   --   --   --  20 26   AST (SGOT) U/L  --   --   --   --   --  14 19   GLUCOSE mg/dL 102* 100* 117* 146*  < > 112* 196*   WBC 10*3/mm3 9.37 9.25 11.28* 11.42*  < > 5.96 6.02   HEMOGLOBIN g/dL 12.2* 12.6* 12.4* 13.4*  < > 12.7* 14.8   PLATELETS 10*3/mm3 129* 131* 110* 121*  < > 120* 127*   PROBNP pg/mL  --   --   --   --   --   --  182.9   PROCALCITONIN ng/mL  --   --   --  0.08*  --  0.06*  --    < > = values in this interval not displayed.    Lab Results   Component Value Date    CALCIUM 8.3 (L) 06/05/2018         Results from last 7 days  Lab Units 06/02/18  1521 06/02/18  1512 06/01/18  0958 05/30/18  0336 05/30/18  0322 05/29/18  2359 05/29/18  2347   BLOODCX  No growth at 2 days No growth at 2 days  --   --   --  No growth at 5 days No growth at 5 days   RESPCX   --   --   --  Light growth (2+) Normal Respiratory Yanet   --   --   --    GRAM STAIN RESULT   --   --  No WBCs or organisms seen Few (2+) Mixed bacterial morphotypes seen on Gram Stain  Rare (1+) WBCs per low power field  Rare (1+) Epithelial cells per low power field  --   --   --    MRSA SCREEN CX   --   --   --   --  No Methicillin Resistant Staphylococcus aureus isolated  --   --          Results from last 7 days  Lab Units 05/29/18  2347   TROPONIN T ng/mL <0.010       Results Review:    I have reviewed the available laboratory results and reviewed the chest imaging personally    ASSESSMENT:   Ac on chronic hypoxic resp failure (On nocturnal o2 only @ baseline)  Ac on chronic hypercapnic resp failure   Parainfluenza URTI   LLL atelectasis/ ? pna  Metabolic encephalopathy   H/o TBI s/p trach - capped usually   Dysphagia s/p PEG   Chronic ruiz - replaced   Routine mgmt of mech ventilation  Ileus improving    PLAN:  LLL atelectasis with hilar fullness and mucus plugging s/p broncho on 6/1/2018 6/1/2018 trach upsize and bronch   Tpiece today  Ceftriaxone for growth, given sig secretions - will check with pharmacy if has tolerated this prev given his pcn reaction  Will c/w mucus clearance techniques - BD/ CHest PT       Full Code   DVT ppx     CCT- 35mins    I have discussed my findings and recommendations with patient family and nursing staff.    Aman Van MD  6/5/2018

## 2018-06-05 NOTE — CONSULTS
Adult Nutrition  Assessment/PES    Patient Name:  Brayden Lynn  YOB: 1960  MRN: [id number]  Admit Date:  5/29/2018    Assessment Date:  6/5/2018    Comments:  TF consult. TF to restart today.  Per RN, family doesn't want  A rate over 30/hr.  They have left for the day. Will try to discuss with them tomorrow. GI notes reviewed as well.  BMS is out. Will follow.          Reason for Assessment     Row Name 06/05/18 1518          Reason for Assessment    Reason For Assessment TF/PN;physician consult             Nutrition/Diet History     Row Name 06/05/18 1523          Nutrition/Diet History    Factors Affecting Nutritional Intake altered gastrointestinal function   colonic ileus               Labs/Tests/Procedures/Meds     Row Name 06/05/18 1519          Labs/Procedures/Meds    Lab Results Reviewed reviewed, pertinent     Lab Results Comments cr, plts        Diagnostic Tests/Procedures    Diagnostic Test/Procedure Reviewed reviewed, pertinent        Medications    Pertinent Medications Reviewed reviewed, pertinent     Pertinent Medications Comments Abx, insulin, ppi                 Nutrition Prescription Ordered     Row Name 06/05/18 1520          Nutrition Prescription PO    Current PO Diet NPO        Nutrition Prescription EN    Enteral Route PEG     Product Jevity 1.2 maggie     TF Delivery Method Continuous     Continuous TF Goal Rate (mL/hr) 30 mL/hr   per family request     Continuous TF Current Rate (mL/hr) 30 mL/hr     Water flush (mL)  30 mL     Water Flush Frequency Every 4 hours             Evaluation of Received Nutrient/Fluid Intake     Row Name 06/05/18 1521          Calories Evaluation    Enteral Calories (kcal) 864     % of Kcal Needs 70        Protein Evaluation    Enteral Protein (gm) 39.6     % of Protein Needs 61        Intake Assessment    Energy/Calorie Requirement Assessment not meeting needs     Protein Requirement Assessment not meeting needs     Fluid Requirement Assessment not  meeting needs     Tolerance --   pending        Fluid Intake Evaluation    Enteral (Free Water) Fluid (mL) 579.6     Free Water Flush Fluid (mL) 180     Total Free Water Intake (mL) 759.6 mL        EN Evaluation    TF Changes Held;Rate decreased     TF Tolerance Other (comment);Diarrhea   BMS removed. TF had been held x 3 days       Problem/Interventions:        Intervention Goal     Row Name 06/05/18 1524          Intervention Goal    General Maintain nutrition;Nutrition support treatment;Reduce/improve symptoms;Meet nutritional needs for age/condition;Disease management/therapy     TF/PN Inititiate TF/PN;Tolerate TF at goal     Weight No significant weight loss             Nutrition Intervention     Row Name 06/05/18 1524          Nutrition Intervention    RD/Tech Action Follow Tx progress;Care plan reviewd       [unfilled]        Education/Evaluation     Row Name 06/05/18 1524          Education    Education Will Instruct as appropriate        Monitor/Evaluation    Monitor Per protocol;Symptoms;I&O;Pertinent labs;TF delivery/tolerance;Weight;Skin status         Electronically signed by:  Lisseth Quintero RD  06/05/18 3:24 PM

## 2018-06-05 NOTE — PLAN OF CARE
Problem: Patient Care Overview  Goal: Plan of Care Review  Outcome: Ongoing (interventions implemented as appropriate)   06/05/18 3355   Coping/Psychosocial   Plan of Care Reviewed With family   OTHER   Outcome Summary On T piece most of the shift. Vitals stable. FMS discontinued as ordered. Father updated on plan of care. Will continue to monitor closely.       Problem: Fall Risk (Adult)  Goal: Absence of Fall  Outcome: Ongoing (interventions implemented as appropriate)      Problem: Skin Injury Risk (Adult)  Goal: Skin Health and Integrity  Outcome: Ongoing (interventions implemented as appropriate)      Problem: Infection, Risk/Actual (Adult)  Goal: Infection Prevention/Resolution  Outcome: Ongoing (interventions implemented as appropriate)      Problem: Nutrition, Enteral (Adult)  Goal: Signs and Symptoms of Listed Potential Problems Will be Absent, Minimized or Managed (Nutrition, Enteral)  Outcome: Ongoing (interventions implemented as appropriate)      Problem: Breathing Pattern Ineffective (Adult)  Goal: Identify Related Risk Factors and Signs and Symptoms  Outcome: Outcome(s) achieved Date Met: 06/05/18    Goal: Effective Oxygenation/Ventilation  Outcome: Ongoing (interventions implemented as appropriate)    Goal: Anxiety/Fear Reduction  Outcome: Ongoing (interventions implemented as appropriate)

## 2018-06-06 LAB
ANION GAP SERPL CALCULATED.3IONS-SCNC: 10.3 MMOL/L
ARTERIAL PATENCY WRIST A: POSITIVE
ATMOSPHERIC PRESS: 747.7 MMHG
BASE EXCESS BLDA CALC-SCNC: 7.3 MMOL/L (ref 0–2)
BASOPHILS # BLD AUTO: 0.01 10*3/MM3 (ref 0–0.2)
BASOPHILS NFR BLD AUTO: 0.1 % (ref 0–1.5)
BDY SITE: ABNORMAL
BUN BLD-MCNC: 16 MG/DL (ref 6–20)
BUN/CREAT SERPL: 39 (ref 7–25)
CALCIUM SPEC-SCNC: 8.1 MG/DL (ref 8.6–10.5)
CHLORIDE SERPL-SCNC: 100 MMOL/L (ref 98–107)
CO2 SERPL-SCNC: 31.7 MMOL/L (ref 22–29)
CREAT BLD-MCNC: 0.41 MG/DL (ref 0.76–1.27)
DEPRECATED RDW RBC AUTO: 52.2 FL (ref 37–54)
EOSINOPHIL # BLD AUTO: 0.16 10*3/MM3 (ref 0–0.7)
EOSINOPHIL NFR BLD AUTO: 2.3 % (ref 0.3–6.2)
ERYTHROCYTE [DISTWIDTH] IN BLOOD BY AUTOMATED COUNT: 14 % (ref 11.5–14.5)
GFR SERPL CREATININE-BSD FRML MDRD: >150 ML/MIN/1.73
GLUCOSE BLD-MCNC: 119 MG/DL (ref 65–99)
GLUCOSE BLDC GLUCOMTR-MCNC: 111 MG/DL (ref 70–130)
GLUCOSE BLDC GLUCOMTR-MCNC: 114 MG/DL (ref 70–130)
GLUCOSE BLDC GLUCOMTR-MCNC: 116 MG/DL (ref 70–130)
GLUCOSE BLDC GLUCOMTR-MCNC: 121 MG/DL (ref 70–130)
GLUCOSE BLDC GLUCOMTR-MCNC: 130 MG/DL (ref 70–130)
GLUCOSE BLDC GLUCOMTR-MCNC: 131 MG/DL (ref 70–130)
GLUCOSE BLDC GLUCOMTR-MCNC: 154 MG/DL (ref 70–130)
HCO3 BLDA-SCNC: 34.3 MMOL/L (ref 22–28)
HCT VFR BLD AUTO: 36.6 % (ref 40.4–52.2)
HGB BLD-MCNC: 11.6 G/DL (ref 13.7–17.6)
HOROWITZ INDEX BLD+IHG-RTO: 50 %
IMM GRANULOCYTES # BLD: 0 10*3/MM3 (ref 0–0.03)
IMM GRANULOCYTES NFR BLD: 0 % (ref 0–0.5)
LYMPHOCYTES # BLD AUTO: 1.13 10*3/MM3 (ref 0.9–4.8)
LYMPHOCYTES NFR BLD AUTO: 15.9 % (ref 19.6–45.3)
MCH RBC QN AUTO: 32.1 PG (ref 27–32.7)
MCHC RBC AUTO-ENTMCNC: 31.7 G/DL (ref 32.6–36.4)
MCV RBC AUTO: 101.4 FL (ref 79.8–96.2)
MODALITY: ABNORMAL
MONOCYTES # BLD AUTO: 0.63 10*3/MM3 (ref 0.2–1.2)
MONOCYTES NFR BLD AUTO: 8.9 % (ref 5–12)
NEUTROPHILS # BLD AUTO: 5.17 10*3/MM3 (ref 1.9–8.1)
NEUTROPHILS NFR BLD AUTO: 72.8 % (ref 42.7–76)
O2 A-A PPRESDIFF RESPIRATORY: 0.3 MMHG
PCO2 BLDA: 57.6 MM HG (ref 35–45)
PH BLDA: 7.38 PH UNITS (ref 7.35–7.45)
PLATELET # BLD AUTO: 139 10*3/MM3 (ref 140–500)
PMV BLD AUTO: 9.4 FL (ref 6–12)
PO2 BLDA: 80.2 MM HG (ref 80–100)
POTASSIUM BLD-SCNC: 3.9 MMOL/L (ref 3.5–5.2)
RBC # BLD AUTO: 3.61 10*6/MM3 (ref 4.6–6)
SAO2 % BLDCOA: 95.2 % (ref 92–99)
SODIUM BLD-SCNC: 142 MMOL/L (ref 136–145)
TOTAL RATE: 22 BREATHS/MINUTE
WBC NRBC COR # BLD: 7.1 10*3/MM3 (ref 4.5–10.7)

## 2018-06-06 PROCEDURE — 94799 UNLISTED PULMONARY SVC/PX: CPT

## 2018-06-06 PROCEDURE — 99231 SBSQ HOSP IP/OBS SF/LOW 25: CPT | Performed by: INTERNAL MEDICINE

## 2018-06-06 PROCEDURE — 85025 COMPLETE CBC W/AUTO DIFF WBC: CPT | Performed by: INTERNAL MEDICINE

## 2018-06-06 PROCEDURE — 25010000002 ENOXAPARIN PER 10 MG: Performed by: INTERNAL MEDICINE

## 2018-06-06 PROCEDURE — 82803 BLOOD GASES ANY COMBINATION: CPT

## 2018-06-06 PROCEDURE — 80048 BASIC METABOLIC PNL TOTAL CA: CPT | Performed by: INTERNAL MEDICINE

## 2018-06-06 PROCEDURE — 94668 MNPJ CHEST WALL SBSQ: CPT

## 2018-06-06 PROCEDURE — 25010000002 CEFTRIAXONE PER 250 MG: Performed by: INTERNAL MEDICINE

## 2018-06-06 PROCEDURE — 82962 GLUCOSE BLOOD TEST: CPT

## 2018-06-06 PROCEDURE — 36600 WITHDRAWAL OF ARTERIAL BLOOD: CPT

## 2018-06-06 RX ADMIN — LANSOPRAZOLE 30 MG: KIT at 06:00

## 2018-06-06 RX ADMIN — BACLOFEN 10 MG: 10 TABLET ORAL at 08:19

## 2018-06-06 RX ADMIN — BACLOFEN 10 MG: 10 TABLET ORAL at 15:45

## 2018-06-06 RX ADMIN — LEVETIRACETAM 750 MG: 100 SOLUTION ORAL at 05:12

## 2018-06-06 RX ADMIN — IPRATROPIUM BROMIDE AND ALBUTEROL SULFATE 3 ML: .5; 3 SOLUTION RESPIRATORY (INHALATION) at 07:03

## 2018-06-06 RX ADMIN — ASPIRIN 325 MG: 325 TABLET ORAL at 08:19

## 2018-06-06 RX ADMIN — CARBAMAZEPINE 100 MG: 100 SUSPENSION ORAL at 20:01

## 2018-06-06 RX ADMIN — DILTIAZEM HYDROCHLORIDE 30 MG: 30 TABLET, FILM COATED ORAL at 20:00

## 2018-06-06 RX ADMIN — IPRATROPIUM BROMIDE AND ALBUTEROL SULFATE 3 ML: .5; 3 SOLUTION RESPIRATORY (INHALATION) at 12:25

## 2018-06-06 RX ADMIN — IPRATROPIUM BROMIDE AND ALBUTEROL SULFATE 3 ML: .5; 3 SOLUTION RESPIRATORY (INHALATION) at 04:03

## 2018-06-06 RX ADMIN — CEFTRIAXONE SODIUM 1 G: 1 INJECTION, SOLUTION INTRAVENOUS at 11:38

## 2018-06-06 RX ADMIN — CETIRIZINE HYDROCHLORIDE 10 MG: 10 TABLET, FILM COATED ORAL at 08:20

## 2018-06-06 RX ADMIN — IPRATROPIUM BROMIDE AND ALBUTEROL SULFATE 3 ML: .5; 3 SOLUTION RESPIRATORY (INHALATION) at 20:17

## 2018-06-06 RX ADMIN — RISPERIDONE 1 MG: 1 SOLUTION ORAL at 18:05

## 2018-06-06 RX ADMIN — ENOXAPARIN SODIUM 40 MG: 100 INJECTION SUBCUTANEOUS at 05:12

## 2018-06-06 RX ADMIN — RISPERIDONE 1 MG: 1 SOLUTION ORAL at 20:00

## 2018-06-06 RX ADMIN — DIGOXIN 125 MCG: 0.12 TABLET ORAL at 11:38

## 2018-06-06 RX ADMIN — IPRATROPIUM BROMIDE AND ALBUTEROL SULFATE 3 ML: .5; 3 SOLUTION RESPIRATORY (INHALATION) at 00:07

## 2018-06-06 RX ADMIN — ARIPIPRAZOLE 15 MG: 5 TABLET ORAL at 08:20

## 2018-06-06 RX ADMIN — LEVETIRACETAM 750 MG: 100 SOLUTION ORAL at 18:05

## 2018-06-06 RX ADMIN — RISPERIDONE 1 MG: 1 SOLUTION ORAL at 08:19

## 2018-06-06 RX ADMIN — DILTIAZEM HYDROCHLORIDE 30 MG: 30 TABLET, FILM COATED ORAL at 15:45

## 2018-06-06 RX ADMIN — IPRATROPIUM BROMIDE AND ALBUTEROL SULFATE 3 ML: .5; 3 SOLUTION RESPIRATORY (INHALATION) at 14:29

## 2018-06-06 RX ADMIN — LEVOTHYROXINE SODIUM 50 MCG: 50 TABLET ORAL at 08:19

## 2018-06-06 RX ADMIN — RISPERIDONE 1 MG: 1 SOLUTION ORAL at 11:38

## 2018-06-06 RX ADMIN — BACLOFEN 10 MG: 10 TABLET ORAL at 20:00

## 2018-06-06 RX ADMIN — CARBAMAZEPINE 100 MG: 100 SUSPENSION ORAL at 08:19

## 2018-06-06 NOTE — PLAN OF CARE
Problem: Patient Care Overview  Goal: Plan of Care Review  Outcome: Ongoing (interventions implemented as appropriate)   06/06/18 6711   Coping/Psychosocial   Plan of Care Reviewed With patient;family   OTHER   Outcome Summary Patient tolerated T-piece all day. Suctioning needed every few hours. Copious secretions. VSS. UOP adequate. Will continue to monitor.    Plan of Care Review   Progress improving

## 2018-06-06 NOTE — PLAN OF CARE
Problem: Nutrition, Enteral (Adult)  Intervention: Monitor/Manage Nutrition Support   06/06/18 0903   Nutrition Interventions   Nutrition Support Management tube feeding rate increased       Goal: Signs and Symptoms of Listed Potential Problems Will be Absent, Minimized or Managed (Nutrition, Enteral)  Outcome: Ongoing (interventions implemented as appropriate)

## 2018-06-06 NOTE — PROGRESS NOTES
Aman Van MD                          [phone]      Patient ID:    Name:  Brayden Lynn    MRN:  [id number]    1960   57 y.o.  male            Patient Care Team:  Guillermo Do MD as PCP - General (Internal Medicine)      Subjective:  Bronch and trach Bedside 2018 Trach upsize to shiley cuff size 6.  abd distension yesterday with ct showing ileus.  Tf restarted this am,  Family feels overall status improving still.  Seems more alert      Objective     Vital Signs past 24hrs    BP range: BP: (120-150)/(60-93) 145/67  Pulse range: Heart Rate:  [52-99] 77  Resp rate range: Resp:  [16-21] 16  Temp range: Temp (24hrs), Av.2 °F (36.8 °C), Min:97.6 °F (36.4 °C), Max:98.7 °F (37.1 °C)      Device (Oxygen Therapy): T - piece FiO2 (%): 40 %     79 kg (174 lb 2.6 oz); Body mass index is 23.62 kg/m².      Intake/Output Summary (Last 24 hours) at 18 0806  Last data filed at 18 0521   Gross per 24 hour   Intake             1138 ml   Output              650 ml   Net              488 ml     FiO2 (%):  [40 %] 40 %  PEEP/CPAP (cm H2O):  [5 cm H20] 5 cm H20  ND SUP:  [8 cm H20] 8 cm H20  MAP (cm H2O):  [8] 8    Exam:    GEN:  Asleep , minimally interactive (baseline)  EYES:   EOM-i, anicteric sclera bilat  ENT:    External ears/nose normal, OP dry  NECK:  Supple, Size 6 shiley suture, cdi, no bleeding , No JVD or cervical lymphadenopthy  LUNGS: Bilateral breath sounds diminished air entry at bilateral bases some rhonchi  CV:  Regular rate and rhythm, No murmur  ABD:  Non tender, soft, no palpable liver or masses, PEG + +BS rectal tube  EXT:  No cyanosis or clubbing, +trace ble edema fine tremor intermittently or right UE    Scheduled meds:      albuterol 6 puff Inhalation Q4H - RT   ARIPiprazole 15 mg Per G Tube Daily   aspirin 325 mg Per G Tube Daily   baclofen 10 mg Per G Tube TID   carBAMazepine 100 mg Per G Tube BID   ceftriaxone 1 g Intravenous Q24H    cetirizine 10 mg Per G Tube Daily   digoxin 125 mcg Per G Tube Daily   diltiaZEM 30 mg Per G Tube TID   enoxaparin 40 mg Subcutaneous Q24H   fentaNYL citrate (PF) 100 mcg Intravenous Once   insulin aspart 0-7 Units Subcutaneous Q6H   lansoprazole 30 mg Nasogastric QAM   levETIRAcetam 750 mg Per G Tube Q12H   levothyroxine 50 mcg Per G Tube Daily   risperiDONE 1 mg Oral 4x Daily   vecuronium 10 mg Intravenous Once       IV meds:                             Data Review:        Results from last 7 days  Lab Units 06/06/18  0232 06/05/18  1145 06/05/18  0449 06/04/18  0743  06/02/18  0529   SODIUM mmol/L 142  --  142 144  < > 138   POTASSIUM mmol/L 3.9 4.2 3.5 3.7  < > 3.1*   CHLORIDE mmol/L 100  --  99 101  < > 93*   CO2 mmol/L 31.7*  --  28.6 31.0*  < > 35.6*   BUN mg/dL 16  --  17 17  < > 31*   CREATININE mg/dL 0.41*  --  0.43* 0.38*  < > 0.44*   CALCIUM mg/dL 8.1*  --  8.3* 8.2*  < > 8.8   GLUCOSE mg/dL 119*  --  102* 100*  < > 146*   WBC 10*3/mm3 7.10  --  9.37 9.25  < > 11.42*   HEMOGLOBIN g/dL 11.6*  --  12.2* 12.6*  < > 13.4*   PLATELETS 10*3/mm3 139*  --  129* 131*  < > 121*   PROCALCITONIN ng/mL  --   --   --   --   --  0.08*   < > = values in this interval not displayed.    Lab Results   Component Value Date    CALCIUM 8.1 (L) 06/06/2018         Results from last 7 days  Lab Units 06/02/18  1521 06/02/18  1512 06/01/18  0958   BLOODCX  No growth at 3 days No growth at 3 days  --    GRAM STAIN RESULT   --   --  No WBCs or organisms seen             Results Review:    I have reviewed the available laboratory results and reviewed the chest imaging personally    ASSESSMENT:   Ac on chronic hypoxic resp failure (On nocturnal o2 only @ baseline)  Ac on chronic hypercapnic resp failure   Parainfluenza URTI   LLL atelectasis/ ? pna  Metabolic encephalopathy   H/o TBI s/p trach - capped usually   Dysphagia s/p PEG   Chronic ruiz - replaced   Ileus improving    PLAN:  LLL atelectasis with hilar fullness and mucus  plugging s/p broncho on 6/1/2018 6/1/2018 trach upsize and bronch   Tpiece continue, a lil more somulent today but hard to tell given his baseline, will check abg given bicarb bump on his rfp.  Ceftriaxone for asp pna, given sig secretions  Will c/w mucus clearance techniques - BD/ [facility] PT       Full Code   DVT ppx       I have discussed my findings and recommendations with patient family and nursing staff.    Aman Van MD  6/6/2018

## 2018-06-06 NOTE — PLAN OF CARE
Problem: Patient Care Overview  Goal: Plan of Care Review  Outcome: Ongoing (interventions implemented as appropriate)   06/06/18 0680   OTHER   Outcome Summary Pt remains on T-piece and tollerationg well. Still frequently needing suction. VSS. HR 60s, so cardizem was held. One BM overnight. UOP great.      Goal: Individualization and Mutuality  Outcome: Ongoing (interventions implemented as appropriate)    Goal: Discharge Needs Assessment  Outcome: Ongoing (interventions implemented as appropriate)    Goal: Interprofessional Rounds/Family Conf  Outcome: Ongoing (interventions implemented as appropriate)

## 2018-06-06 NOTE — PROGRESS NOTES
"Adult Nutrition  Assessment/PES    Patient Name:  Brayden Lynn  YOB: 1960  MRN: [id number]  Admit Date:  5/29/2018    Assessment Date:  6/6/2018    Comments:  TF follow up.  Tolerating TF at 30cc/hr.  Spoke with parents.  They were concerned about pt getting distended and aspirating. MD spoke with them as well.  They are agreeable to ^ing feeds to meet est needs. They also report constipation issues and that he requires enemas qod and miralax daily at home. Could use daily dose of prune juice at home. Will adjust TF here and follow.           Reason for Assessment     Row Name 06/06/18 0850          Reason for Assessment    Reason For Assessment follow-up protocol;TF/PN             Nutrition/Diet History     Row Name 06/06/18 0850          Nutrition/Diet History    Typical Food/Fluid Intake get 4 cans Fibersource HN at home along with V8 juice and \"other things\". requires miralax and enemas qod for stooling.             Anthropometrics     Row Name 06/06/18 0232          Anthropometrics    Weight 79 kg (174 lb 2.6 oz)             Labs/Tests/Procedures/Meds     Row Name 06/06/18 0851          Labs/Procedures/Meds    Lab Results Reviewed reviewed, pertinent     Lab Results Comments glu        Diagnostic Tests/Procedures    Diagnostic Test/Procedure Reviewed reviewed, pertinent        Medications    Pertinent Medications Reviewed reviewed, pertinent     Pertinent Medications Comments Abx, insulin, ppi             Physical Findings     Row Name 06/06/18 0851          Physical Findings    Overall Physical Appearance on oxygen therapy   trach, tpiece     Gastrointestinal feeding tube     Tubes PEG     Skin other (see comments)   excoriation               Nutrition Prescription Ordered     Row Name 06/06/18 0853          Nutrition Prescription PO    Current PO Diet NPO        Nutrition Prescription EN    Enteral Route PEG     Product Jevity 1.2 maggie     TF Delivery Method Continuous     Continuous TF Goal " Rate (mL/hr) 30 mL/hr     Continuous TF Current Rate (mL/hr) 30 mL/hr     Water flush (mL)  30 mL     Water Flush Frequency Every 4 hours             Evaluation of Received Nutrient/Fluid Intake     Row Name 06/06/18 0853          Calories Evaluation    Enteral Calories (kcal) 864     % of Kcal Needs 70        Protein Evaluation    Enteral Protein (gm) 39     % of Protein Needs 61        Intake Assessment    Energy/Calorie Requirement Assessment not meeting needs     Protein Requirement Assessment not meeting needs     Fluid Requirement Assessment not meeting needs     Tolerance tolerating        Fluid Intake Evaluation    Enteral (Free Water) Fluid (mL) 579     Free Water Flush Fluid (mL) 180     Total Free Water Intake (mL) 759 mL        EN Evaluation    TF Residual 0-40     TF Tolerance Diarrhea   today     HOB Greater than or equal to 30 degress       Problem/Interventions:        Intervention Goal     Row Name 06/06/18 0854          Intervention Goal    General Maintain nutrition;Nutrition support treatment;Meet nutritional needs for age/condition;Reduce/improve symptoms;Disease management/therapy;Improved nutrition related lab(s)     TF/PN Adjust TF/PN;Tolerate TF at goal     Weight No significant weight loss             Nutrition Intervention     Row Name 06/06/18 0854          Nutrition Intervention    RD/Tech Action Follow Tx progress;Care plan reviewd;Recommend/ordered     Recommended/Ordered EN               Education/Evaluation     Row Name 06/06/18 0856          Education    Education Provided education regarding;Education topics     Education Topics TF instruction        Monitor/Evaluation    Monitor Per protocol;Symptoms;I&O;Pertinent labs;TF delivery/tolerance;Skin status;Weight     Education Follow-up Reinforce PRN         Electronically signed by:  Lisseth Quintero RD  06/06/18 8:58 AM

## 2018-06-06 NOTE — PROGRESS NOTES
[facility]  Inpatient Progress Note    Reason for Follow Up:  Colonic intertia    Subjective     Interval History:   Nursing reports no new issues. Pt had BM last night.  Tolerating TFs at 30cc/hr    Current Facility-Administered Medications:   •  acetaminophen (TYLENOL) tablet 650 mg, 650 mg, Oral, Q4H PRN, 650 mg at 06/02/18 1607 **OR** acetaminophen (TYLENOL) suppository 650 mg, 650 mg, Rectal, Q4H PRN, Ravi Van MD  •  albuterol (PROVENTIL HFA;VENTOLIN HFA) inhaler 6 puff, 6 puff, Inhalation, Q4H - RT, Juan Blankenship MD, 6 puff at 06/05/18 1146  •  ARIPiprazole (ABILIFY) tablet 15 mg, 15 mg, Per G Tube, Daily, Ed Andrews MD, 15 mg at 06/05/18 1000  •  aspirin tablet 325 mg, 325 mg, Per G Tube, Daily, Ravi Van MD, 325 mg at 06/05/18 1000  •  baclofen (LIORESAL) tablet 10 mg, 10 mg, Per G Tube, TID, Ed Andrews MD, 10 mg at 06/05/18 2004  •  carBAMazepine (TEGretol) 100 MG/5ML suspension 100 mg, 100 mg, Per G Tube, BID, Ravi Van MD, 100 mg at 06/05/18 2004  •  cefTRIAXone (ROCEPHIN) IVPB 1 g, 1 g, Intravenous, Q24H, Aman Van MD, Last Rate: 100 mL/hr at 06/05/18 1233, 1 g at 06/05/18 1233  •  cetirizine (zyrTEC) tablet 10 mg, 10 mg, Per G Tube, Daily, Ravi Van MD, 10 mg at 06/05/18 1000  •  dextrose (D50W) solution 25 g, 25 g, Intravenous, Q15 Min PRN, Ravi Van MD  •  dextrose (GLUTOSE) oral gel 15 g, 15 g, Oral, Q15 Min PRN, Ravi Van MD  •  digoxin (LANOXIN) tablet 125 mcg, 125 mcg, Per G Tube, Daily, Ravi Van MD, 125 mcg at 06/05/18 1232  •  diltiaZEM (CARDIZEM) tablet 30 mg, 30 mg, Per G Tube, TID, Ravi Van MD, Stopped at 06/05/18 2001  •  enoxaparin (LOVENOX) syringe 40 mg, 40 mg, Subcutaneous, Q24H, Ravi Van MD, 40 mg at 06/06/18 0512  •  fentaNYL citrate (PF) (SUBLIMAZE) injection 100 mcg, 100 mcg, Intravenous, Once, Juan Blankenship MD  •  fentaNYL citrate (PF) (SUBLIMAZE)  injection 12.5 mcg, 12.5 mcg, Intravenous, Q2H PRN, Juan Blankenship MD, 12.5 mcg at 06/02/18 0418  •  glucagon (human recombinant) (GLUCAGEN DIAGNOSTIC) injection 1 mg, 1 mg, Subcutaneous, PRN, Ravi Van MD  •  insulin aspart (novoLOG) injection 0-7 Units, 0-7 Units, Subcutaneous, Q6H, Ravi Van MD, 2 Units at 06/02/18 1144  •  ipratropium-albuterol (DUO-NEB) nebulizer solution 3 mL, 3 mL, Nebulization, Q2H PRN, Ravi Van MD, 3 mL at 06/06/18 0703  •  lansoprazole (FIRST) oral suspension 30 mg, 30 mg, Nasogastric, QAM, Juan Blankenship MD, 30 mg at 06/06/18 0600  •  levETIRAcetam (KEPPRA) 100 MG/ML solution 750 mg, 750 mg, Per G Tube, Q12H, Ravi Van MD, 750 mg at 06/06/18 0512  •  levothyroxine (SYNTHROID, LEVOTHROID) tablet 50 mcg, 50 mcg, Per G Tube, Daily, Ravi Van MD, 50 mcg at 06/05/18 1000  •  potassium chloride (MICRO-K) CR capsule 40 mEq, 40 mEq, Oral, PRN **OR** potassium chloride (KLOR-CON) packet 40 mEq, 40 mEq, Oral, PRN, 40 mEq at 06/05/18 0658 **OR** potassium chloride 10 mEq in 100 mL IVPB, 10 mEq, Intravenous, Q1H PRN, Ravi Van MD  •  risperiDONE (risperDAL) 1 MG/ML oral solution 1 mg, 1 mg, Oral, 4x Daily, Aman Van MD, 1 mg at 06/05/18 2004  •  sodium chloride 0.9 % flush 1-10 mL, 1-10 mL, Intravenous, PRN, Ravi Van MD  •  sodium chloride 0.9 % flush 10 mL, 10 mL, Intravenous, PRN, Thomas Snow MD  •  vecuronium (NORCURON) injection 10 mg, 10 mg, Intravenous, Once, Juan Ricardo Blankenship MD  Review of Systems:    Review of systems could not be obtained due to  patient nonverbal.    Objective     Vital Signs  Temp:  [97.6 °F (36.4 °C)-98.7 °F (37.1 °C)] 98.7 °F (37.1 °C)  Heart Rate:  [52-99] 77  Resp:  [16-21] 16  BP: (109-150)/(45-93) 145/67  FiO2 (%):  [40 %] 40 %  Body mass index is 23.62 kg/m².    Intake/Output Summary (Last 24 hours) at 06/06/18 1685  Last data filed at 06/06/18 0521   Gross per 24 hour   Intake              1138 ml   Output              650 ml   Net              488 ml     No intake/output data recorded.     Physical Exam:   General: patient awake,    Cardiovascular: regular rhythm and rate, no murmurs auscultated   Pulm: clear to auscultation bilaterally, regular and unlabored   Abdomen: soft, G tube in place   Rectal: rectal tube in place, some austen-rectal excoriations   Extremities: no rash or edema        Results Review:     I reviewed the patient's new clinical results.  I reviewed the patient's new imaging results and agree with the interpretation.      Results from last 7 days  Lab Units 06/06/18  0232 06/05/18  0449 06/04/18  0743   WBC 10*3/mm3 7.10 9.37 9.25   HEMOGLOBIN g/dL 11.6* 12.2* 12.6*   HEMATOCRIT % 36.6* 38.9* 38.3*   PLATELETS 10*3/mm3 139* 129* 131*       Results from last 7 days  Lab Units 06/06/18  0232 06/05/18  1145 06/05/18 0449 06/04/18  0743   SODIUM mmol/L 142  --  142 144   POTASSIUM mmol/L 3.9 4.2 3.5 3.7   CHLORIDE mmol/L 100  --  99 101   CO2 mmol/L 31.7*  --  28.6 31.0*   BUN mg/dL 16  --  17 17   CREATININE mg/dL 0.41*  --  0.43* 0.38*   CALCIUM mg/dL 8.1*  --  8.3* 8.2*   GLUCOSE mg/dL 119*  --  102* 100*         No results found for: LIPASE    KUB from 6/3 reviewed - agree with interpretation    Assessment/Plan   Assessment:   1.  Colonic ileus - resolved per imaging  2.  Acute hypoxic respiratory failure  3.  Dysphagia with feeding tube    Plan:   Advance TFs to goal rate per nutrition  Ambulate when able    GI will sign off.      I discussed the patients findings and my recommendations with nursing staff.         Linus Perkins M.D.  [facility]  [address] [place] [address]  Office: [phone]

## 2018-06-07 LAB
ANION GAP SERPL CALCULATED.3IONS-SCNC: 8.2 MMOL/L
BACTERIA SPEC AEROBE CULT: NORMAL
BACTERIA SPEC AEROBE CULT: NORMAL
BASOPHILS # BLD AUTO: 0.02 10*3/MM3 (ref 0–0.2)
BASOPHILS NFR BLD AUTO: 0.2 % (ref 0–1.5)
BUN BLD-MCNC: 13 MG/DL (ref 6–20)
BUN/CREAT SERPL: 32.5 (ref 7–25)
CALCIUM SPEC-SCNC: 8.5 MG/DL (ref 8.6–10.5)
CHLORIDE SERPL-SCNC: 101 MMOL/L (ref 98–107)
CO2 SERPL-SCNC: 33.8 MMOL/L (ref 22–29)
CREAT BLD-MCNC: 0.4 MG/DL (ref 0.76–1.27)
DEPRECATED RDW RBC AUTO: 51 FL (ref 37–54)
EOSINOPHIL # BLD AUTO: 0.21 10*3/MM3 (ref 0–0.7)
EOSINOPHIL NFR BLD AUTO: 2.6 % (ref 0.3–6.2)
ERYTHROCYTE [DISTWIDTH] IN BLOOD BY AUTOMATED COUNT: 13.7 % (ref 11.5–14.5)
GFR SERPL CREATININE-BSD FRML MDRD: >150 ML/MIN/1.73
GLUCOSE BLD-MCNC: 134 MG/DL (ref 65–99)
GLUCOSE BLDC GLUCOMTR-MCNC: 129 MG/DL (ref 70–130)
GLUCOSE BLDC GLUCOMTR-MCNC: 130 MG/DL (ref 70–130)
GLUCOSE BLDC GLUCOMTR-MCNC: 145 MG/DL (ref 70–130)
HCT VFR BLD AUTO: 36.8 % (ref 40.4–52.2)
HGB BLD-MCNC: 11.5 G/DL (ref 13.7–17.6)
IMM GRANULOCYTES # BLD: 0.02 10*3/MM3 (ref 0–0.03)
IMM GRANULOCYTES NFR BLD: 0.2 % (ref 0–0.5)
LYMPHOCYTES # BLD AUTO: 0.91 10*3/MM3 (ref 0.9–4.8)
LYMPHOCYTES NFR BLD AUTO: 11.3 % (ref 19.6–45.3)
MCH RBC QN AUTO: 31.5 PG (ref 27–32.7)
MCHC RBC AUTO-ENTMCNC: 31.3 G/DL (ref 32.6–36.4)
MCV RBC AUTO: 100.8 FL (ref 79.8–96.2)
MONOCYTES # BLD AUTO: 0.73 10*3/MM3 (ref 0.2–1.2)
MONOCYTES NFR BLD AUTO: 9.1 % (ref 5–12)
NEUTROPHILS # BLD AUTO: 6.16 10*3/MM3 (ref 1.9–8.1)
NEUTROPHILS NFR BLD AUTO: 76.6 % (ref 42.7–76)
PLATELET # BLD AUTO: 161 10*3/MM3 (ref 140–500)
PMV BLD AUTO: 9.3 FL (ref 6–12)
POTASSIUM BLD-SCNC: 3.8 MMOL/L (ref 3.5–5.2)
RBC # BLD AUTO: 3.65 10*6/MM3 (ref 4.6–6)
SODIUM BLD-SCNC: 143 MMOL/L (ref 136–145)
WBC NRBC COR # BLD: 8.05 10*3/MM3 (ref 4.5–10.7)

## 2018-06-07 PROCEDURE — 25010000002 CEFTRIAXONE PER 250 MG: Performed by: INTERNAL MEDICINE

## 2018-06-07 PROCEDURE — 25010000002 FENTANYL CITRATE (PF) 100 MCG/2ML SOLUTION: Performed by: INTERNAL MEDICINE

## 2018-06-07 PROCEDURE — 80048 BASIC METABOLIC PNL TOTAL CA: CPT | Performed by: INTERNAL MEDICINE

## 2018-06-07 PROCEDURE — 94668 MNPJ CHEST WALL SBSQ: CPT

## 2018-06-07 PROCEDURE — 94799 UNLISTED PULMONARY SVC/PX: CPT

## 2018-06-07 PROCEDURE — 82962 GLUCOSE BLOOD TEST: CPT

## 2018-06-07 PROCEDURE — 25010000002 ENOXAPARIN PER 10 MG: Performed by: INTERNAL MEDICINE

## 2018-06-07 PROCEDURE — 85025 COMPLETE CBC W/AUTO DIFF WBC: CPT | Performed by: INTERNAL MEDICINE

## 2018-06-07 RX ORDER — IPRATROPIUM BROMIDE AND ALBUTEROL SULFATE 2.5; .5 MG/3ML; MG/3ML
3 SOLUTION RESPIRATORY (INHALATION)
Status: DISCONTINUED | OUTPATIENT
Start: 2018-06-07 | End: 2018-06-10

## 2018-06-07 RX ADMIN — LEVETIRACETAM 750 MG: 100 SOLUTION ORAL at 17:07

## 2018-06-07 RX ADMIN — ENOXAPARIN SODIUM 40 MG: 100 INJECTION SUBCUTANEOUS at 05:02

## 2018-06-07 RX ADMIN — IPRATROPIUM BROMIDE AND ALBUTEROL SULFATE 3 ML: .5; 3 SOLUTION RESPIRATORY (INHALATION) at 04:12

## 2018-06-07 RX ADMIN — LEVOTHYROXINE SODIUM 50 MCG: 50 TABLET ORAL at 08:16

## 2018-06-07 RX ADMIN — ACETAMINOPHEN 650 MG: 325 TABLET ORAL at 16:53

## 2018-06-07 RX ADMIN — LANSOPRAZOLE 30 MG: KIT at 06:39

## 2018-06-07 RX ADMIN — RISPERIDONE 1 MG: 1 SOLUTION ORAL at 17:08

## 2018-06-07 RX ADMIN — CEFTRIAXONE SODIUM 1 G: 1 INJECTION, SOLUTION INTRAVENOUS at 12:00

## 2018-06-07 RX ADMIN — BACLOFEN 10 MG: 10 TABLET ORAL at 08:16

## 2018-06-07 RX ADMIN — BACLOFEN 10 MG: 10 TABLET ORAL at 23:02

## 2018-06-07 RX ADMIN — FENTANYL CITRATE 12.5 MCG: 50 INJECTION, SOLUTION INTRAMUSCULAR; INTRAVENOUS at 23:09

## 2018-06-07 RX ADMIN — DILTIAZEM HYDROCHLORIDE 30 MG: 30 TABLET, FILM COATED ORAL at 23:01

## 2018-06-07 RX ADMIN — LEVETIRACETAM 750 MG: 100 SOLUTION ORAL at 05:02

## 2018-06-07 RX ADMIN — RISPERIDONE 1 MG: 1 SOLUTION ORAL at 12:00

## 2018-06-07 RX ADMIN — CARBAMAZEPINE 100 MG: 100 SUSPENSION ORAL at 23:02

## 2018-06-07 RX ADMIN — ASPIRIN 325 MG: 325 TABLET ORAL at 08:16

## 2018-06-07 RX ADMIN — DILTIAZEM HYDROCHLORIDE 30 MG: 30 TABLET, FILM COATED ORAL at 15:32

## 2018-06-07 RX ADMIN — RISPERIDONE 1 MG: 1 SOLUTION ORAL at 23:02

## 2018-06-07 RX ADMIN — BACLOFEN 10 MG: 10 TABLET ORAL at 15:32

## 2018-06-07 RX ADMIN — IPRATROPIUM BROMIDE AND ALBUTEROL SULFATE 3 ML: .5; 3 SOLUTION RESPIRATORY (INHALATION) at 16:27

## 2018-06-07 RX ADMIN — DIGOXIN 125 MCG: 0.12 TABLET ORAL at 12:00

## 2018-06-07 RX ADMIN — IPRATROPIUM BROMIDE AND ALBUTEROL SULFATE 3 ML: .5; 3 SOLUTION RESPIRATORY (INHALATION) at 07:14

## 2018-06-07 RX ADMIN — IPRATROPIUM BROMIDE AND ALBUTEROL SULFATE 3 ML: .5; 3 SOLUTION RESPIRATORY (INHALATION) at 19:33

## 2018-06-07 RX ADMIN — RISPERIDONE 1 MG: 1 SOLUTION ORAL at 08:16

## 2018-06-07 RX ADMIN — ARIPIPRAZOLE 15 MG: 5 TABLET ORAL at 08:16

## 2018-06-07 RX ADMIN — IPRATROPIUM BROMIDE AND ALBUTEROL SULFATE 3 ML: .5; 3 SOLUTION RESPIRATORY (INHALATION) at 11:43

## 2018-06-07 RX ADMIN — CETIRIZINE HYDROCHLORIDE 10 MG: 10 TABLET, FILM COATED ORAL at 08:16

## 2018-06-07 RX ADMIN — CARBAMAZEPINE 100 MG: 100 SUSPENSION ORAL at 08:16

## 2018-06-07 NOTE — PLAN OF CARE
Problem: Patient Care Overview  Goal: Plan of Care Review  Outcome: Ongoing (interventions implemented as appropriate)   06/07/18 3380   Coping/Psychosocial   Plan of Care Reviewed With patient;family   OTHER   Outcome Summary On Tpiece all day at 40% O2. O2 sats between 89-95% depending on secretions. Suctioning Q 2-3 hours. UOP about 500 for the day. Free water increased. VSS. Family updated on plan of care. Likely to move to tele if a bed close to nurses station is an option. Will continue to monitor.    Plan of Care Review   Progress improving

## 2018-06-07 NOTE — PROGRESS NOTES
Aman Van MD                          [phone]      Patient ID:    Name:  Brayden Lynn    MRN:  [id number]    1960   57 y.o.  male            Patient Care Team:  Guillermo Do MD as PCP - General (Internal Medicine)      Subjective:  Bronch and trach Bedside 2018 Trach upsize to shiley cuff size 6.  Tf restarted tolerating well  Family feels overall status improving still.  Seems more alert  Frequent suctioning yesterday every hour, seems to be getting better.      Objective     Vital Signs past 24hrs    BP range: BP: (108-166)/(63-86) 108/75  Pulse range: Heart Rate:  [56-87] 59  Resp rate range: Resp:  [16-20] 18  Temp range: Temp (24hrs), Av.9 °F (36.6 °C), Min:97.3 °F (36.3 °C), Max:98.5 °F (36.9 °C)      Device (Oxygen Therapy): T - piece FiO2 (%): 40 %     79 kg (174 lb 2.6 oz); Body mass index is 23.62 kg/m².      Intake/Output Summary (Last 24 hours) at 18 0849  Last data filed at 18 0400   Gross per 24 hour   Intake             1580 ml   Output              700 ml   Net              880 ml          Exam:    GEN:  Awake , minimally interactive (baseline)  EYES:   EOM-i, anicteric sclera bilat  ENT:    External ears/nose normal, OP dry  NECK:  Supple, Size 6 shiley suture, cdi, no bleeding , No JVD or cervical lymphadenopthy  LUNGS: Bilateral breath sounds diminished air entry at bilateral bases some rhonchi  CV:  Regular rate and rhythm, No murmur  ABD:  Non tender, soft, no palpable liver or masses, PEG + +BS rectal tube  EXT:  No cyanosis or clubbing, +trace ble edema not much tremor    Scheduled meds:      albuterol 6 puff Inhalation Q4H - RT   ARIPiprazole 15 mg Per G Tube Daily   aspirin 325 mg Per G Tube Daily   baclofen 10 mg Per G Tube TID   carBAMazepine 100 mg Per G Tube BID   ceftriaxone 1 g Intravenous Q24H   cetirizine 10 mg Per G Tube Daily   digoxin 125 mcg Per G Tube Daily   diltiaZEM 30 mg Per G Tube TID   enoxaparin  40 mg Subcutaneous Q24H   fentaNYL citrate (PF) 100 mcg Intravenous Once   insulin aspart 0-7 Units Subcutaneous Q6H   lansoprazole 30 mg Nasogastric QAM   levETIRAcetam 750 mg Per G Tube Q12H   levothyroxine 50 mcg Per G Tube Daily   risperiDONE 1 mg Oral 4x Daily   vecuronium 10 mg Intravenous Once       IV meds:                             Data Review:        Results from last 7 days  Lab Units 06/07/18  0439 06/06/18  0232 06/05/18  1145 06/05/18  0449  06/02/18  0529   SODIUM mmol/L 143 142  --  142  < > 138   POTASSIUM mmol/L 3.8 3.9 4.2 3.5  < > 3.1*   CHLORIDE mmol/L 101 100  --  99  < > 93*   CO2 mmol/L 33.8* 31.7*  --  28.6  < > 35.6*   BUN mg/dL 13 16  --  17  < > 31*   CREATININE mg/dL 0.40* 0.41*  --  0.43*  < > 0.44*   CALCIUM mg/dL 8.5* 8.1*  --  8.3*  < > 8.8   GLUCOSE mg/dL 134* 119*  --  102*  < > 146*   WBC 10*3/mm3 8.05 7.10  --  9.37  < > 11.42*   HEMOGLOBIN g/dL 11.5* 11.6*  --  12.2*  < > 13.4*   PLATELETS 10*3/mm3 161 139*  --  129*  < > 121*   PROCALCITONIN ng/mL  --   --   --   --   --  0.08*   < > = values in this interval not displayed.    Lab Results   Component Value Date    CALCIUM 8.5 (L) 06/07/2018         Results from last 7 days  Lab Units 06/02/18  1521 06/02/18  1512 06/01/18  0958   BLOODCX  No growth at 4 days No growth at 4 days  --    GRAM STAIN RESULT   --   --  No WBCs or organisms seen             Results Review:    I have reviewed the available laboratory results and reviewed the chest imaging personally    ASSESSMENT:   Ac on chronic hypoxic resp failure (On nocturnal o2 only @ baseline)  Ac on chronic hypercapnic resp failure   Parainfluenza URTI   LLL atelectasis/ ? pna  Metabolic encephalopathy   H/o TBI s/p trach - capped usually   Dysphagia s/p PEG   Chronic ruiz - replaced   Ileus improving    PLAN:  LLL atelectasis with hilar fullness and mucus plugging s/p broncho on 6/1/2018 6/1/2018 trach upsize and bronch   Tpiece continue  Ceftriaxone for asp pna, given  sig secretions Day 3  Will c/w mucus clearance techniques - BD/ CHest PT     If suctioning requirements not severe, can move to floor later today.      Full Code   DVT ppx       I have discussed my findings and recommendations with patient family and nursing staff.    Aman Van MD  6/7/2018

## 2018-06-07 NOTE — PLAN OF CARE
Problem: Patient Care Overview  Goal: Plan of Care Review  Outcome: Ongoing (interventions implemented as appropriate)   06/07/18 0704   Coping/Psychosocial   Plan of Care Reviewed With patient   OTHER   Outcome Summary On Tpiece all night. O2 hanging at 89 when secretions build up. VSS. Will continue to monitor   Plan of Care Review   Progress improving       Problem: Fall Risk (Adult)  Goal: Absence of Fall  Outcome: Ongoing (interventions implemented as appropriate)      Problem: Skin Injury Risk (Adult)  Goal: Skin Health and Integrity  Outcome: Ongoing (interventions implemented as appropriate)      Problem: Infection, Risk/Actual (Adult)  Goal: Infection Prevention/Resolution  Outcome: Ongoing (interventions implemented as appropriate)      Problem: Nutrition, Enteral (Adult)  Goal: Signs and Symptoms of Listed Potential Problems Will be Absent, Minimized or Managed (Nutrition, Enteral)  Outcome: Ongoing (interventions implemented as appropriate)      Problem: Breathing Pattern Ineffective (Adult)  Goal: Effective Oxygenation/Ventilation  Outcome: Ongoing (interventions implemented as appropriate)    Goal: Anxiety/Fear Reduction  Outcome: Ongoing (interventions implemented as appropriate)      Problem: Ventilation, Mechanical Invasive (Adult)  Goal: Signs and Symptoms of Listed Potential Problems Will be Absent, Minimized or Managed (Ventilation, Mechanical Invasive)  Outcome: Outcome(s) achieved Date Met: 06/07/18

## 2018-06-08 LAB
ANION GAP SERPL CALCULATED.3IONS-SCNC: 9.4 MMOL/L
BASOPHILS # BLD AUTO: 0.01 10*3/MM3 (ref 0–0.2)
BASOPHILS NFR BLD AUTO: 0.1 % (ref 0–1.5)
BUN BLD-MCNC: 10 MG/DL (ref 6–20)
BUN/CREAT SERPL: 33.3 (ref 7–25)
CALCIUM SPEC-SCNC: 8.5 MG/DL (ref 8.6–10.5)
CHLORIDE SERPL-SCNC: 100 MMOL/L (ref 98–107)
CO2 SERPL-SCNC: 33.6 MMOL/L (ref 22–29)
CREAT BLD-MCNC: 0.3 MG/DL (ref 0.76–1.27)
DEPRECATED RDW RBC AUTO: 50.7 FL (ref 37–54)
EOSINOPHIL # BLD AUTO: 0.27 10*3/MM3 (ref 0–0.7)
EOSINOPHIL NFR BLD AUTO: 3.6 % (ref 0.3–6.2)
ERYTHROCYTE [DISTWIDTH] IN BLOOD BY AUTOMATED COUNT: 13.7 % (ref 11.5–14.5)
GFR SERPL CREATININE-BSD FRML MDRD: >150 ML/MIN/1.73
GLUCOSE BLD-MCNC: 112 MG/DL (ref 65–99)
GLUCOSE BLDC GLUCOMTR-MCNC: 122 MG/DL (ref 70–130)
GLUCOSE BLDC GLUCOMTR-MCNC: 123 MG/DL (ref 70–130)
GLUCOSE BLDC GLUCOMTR-MCNC: 132 MG/DL (ref 70–130)
HCT VFR BLD AUTO: 39.8 % (ref 40.4–52.2)
HGB BLD-MCNC: 12.5 G/DL (ref 13.7–17.6)
IMM GRANULOCYTES # BLD: 0.03 10*3/MM3 (ref 0–0.03)
IMM GRANULOCYTES NFR BLD: 0.4 % (ref 0–0.5)
LYMPHOCYTES # BLD AUTO: 1.05 10*3/MM3 (ref 0.9–4.8)
LYMPHOCYTES NFR BLD AUTO: 14.2 % (ref 19.6–45.3)
MCH RBC QN AUTO: 31.6 PG (ref 27–32.7)
MCHC RBC AUTO-ENTMCNC: 31.4 G/DL (ref 32.6–36.4)
MCV RBC AUTO: 100.8 FL (ref 79.8–96.2)
MONOCYTES # BLD AUTO: 0.67 10*3/MM3 (ref 0.2–1.2)
MONOCYTES NFR BLD AUTO: 9 % (ref 5–12)
NEUTROPHILS # BLD AUTO: 5.38 10*3/MM3 (ref 1.9–8.1)
NEUTROPHILS NFR BLD AUTO: 72.7 % (ref 42.7–76)
PLATELET # BLD AUTO: 171 10*3/MM3 (ref 140–500)
PMV BLD AUTO: 9.3 FL (ref 6–12)
POTASSIUM BLD-SCNC: 3.7 MMOL/L (ref 3.5–5.2)
RBC # BLD AUTO: 3.95 10*6/MM3 (ref 4.6–6)
SODIUM BLD-SCNC: 143 MMOL/L (ref 136–145)
WBC NRBC COR # BLD: 7.41 10*3/MM3 (ref 4.5–10.7)

## 2018-06-08 PROCEDURE — 25010000002 ENOXAPARIN PER 10 MG: Performed by: INTERNAL MEDICINE

## 2018-06-08 PROCEDURE — 94668 MNPJ CHEST WALL SBSQ: CPT

## 2018-06-08 PROCEDURE — 94799 UNLISTED PULMONARY SVC/PX: CPT

## 2018-06-08 PROCEDURE — 85025 COMPLETE CBC W/AUTO DIFF WBC: CPT | Performed by: INTERNAL MEDICINE

## 2018-06-08 PROCEDURE — 82962 GLUCOSE BLOOD TEST: CPT

## 2018-06-08 PROCEDURE — 25010000002 FENTANYL CITRATE (PF) 100 MCG/2ML SOLUTION: Performed by: INTERNAL MEDICINE

## 2018-06-08 PROCEDURE — 80048 BASIC METABOLIC PNL TOTAL CA: CPT | Performed by: INTERNAL MEDICINE

## 2018-06-08 PROCEDURE — 25010000002 CEFTRIAXONE PER 250 MG: Performed by: INTERNAL MEDICINE

## 2018-06-08 RX ORDER — SODIUM CHLORIDE FOR INHALATION 7 %
4 VIAL, NEBULIZER (ML) INHALATION
Status: DISCONTINUED | OUTPATIENT
Start: 2018-06-08 | End: 2018-06-10

## 2018-06-08 RX ADMIN — ASPIRIN 325 MG: 325 TABLET ORAL at 08:12

## 2018-06-08 RX ADMIN — CARBAMAZEPINE 100 MG: 100 SUSPENSION ORAL at 08:12

## 2018-06-08 RX ADMIN — RISPERIDONE 1 MG: 1 SOLUTION ORAL at 20:06

## 2018-06-08 RX ADMIN — RISPERIDONE 1 MG: 1 SOLUTION ORAL at 08:12

## 2018-06-08 RX ADMIN — IPRATROPIUM BROMIDE AND ALBUTEROL SULFATE 3 ML: .5; 3 SOLUTION RESPIRATORY (INHALATION) at 15:20

## 2018-06-08 RX ADMIN — IPRATROPIUM BROMIDE AND ALBUTEROL SULFATE 3 ML: .5; 3 SOLUTION RESPIRATORY (INHALATION) at 12:40

## 2018-06-08 RX ADMIN — BACLOFEN 10 MG: 10 TABLET ORAL at 08:12

## 2018-06-08 RX ADMIN — ARIPIPRAZOLE 15 MG: 5 TABLET ORAL at 08:12

## 2018-06-08 RX ADMIN — LEVOTHYROXINE SODIUM 50 MCG: 50 TABLET ORAL at 08:12

## 2018-06-08 RX ADMIN — DILTIAZEM HYDROCHLORIDE 30 MG: 30 TABLET, FILM COATED ORAL at 08:12

## 2018-06-08 RX ADMIN — LEVETIRACETAM 750 MG: 100 SOLUTION ORAL at 05:15

## 2018-06-08 RX ADMIN — CEFTRIAXONE SODIUM 1 G: 1 INJECTION, SOLUTION INTRAVENOUS at 12:00

## 2018-06-08 RX ADMIN — FENTANYL CITRATE 12.5 MCG: 50 INJECTION, SOLUTION INTRAMUSCULAR; INTRAVENOUS at 19:32

## 2018-06-08 RX ADMIN — IPRATROPIUM BROMIDE AND ALBUTEROL SULFATE 3 ML: .5; 3 SOLUTION RESPIRATORY (INHALATION) at 20:25

## 2018-06-08 RX ADMIN — CETIRIZINE HYDROCHLORIDE 10 MG: 10 TABLET, FILM COATED ORAL at 08:12

## 2018-06-08 RX ADMIN — RISPERIDONE 1 MG: 1 SOLUTION ORAL at 17:31

## 2018-06-08 RX ADMIN — LANSOPRAZOLE 30 MG: KIT at 06:04

## 2018-06-08 RX ADMIN — DILTIAZEM HYDROCHLORIDE 30 MG: 30 TABLET, FILM COATED ORAL at 20:06

## 2018-06-08 RX ADMIN — BACLOFEN 10 MG: 10 TABLET ORAL at 20:06

## 2018-06-08 RX ADMIN — IPRATROPIUM BROMIDE AND ALBUTEROL SULFATE 3 ML: .5; 3 SOLUTION RESPIRATORY (INHALATION) at 07:22

## 2018-06-08 RX ADMIN — RISPERIDONE 1 MG: 1 SOLUTION ORAL at 12:00

## 2018-06-08 RX ADMIN — LEVETIRACETAM 750 MG: 100 SOLUTION ORAL at 17:31

## 2018-06-08 RX ADMIN — ENOXAPARIN SODIUM 40 MG: 100 INJECTION SUBCUTANEOUS at 05:15

## 2018-06-08 RX ADMIN — BACLOFEN 10 MG: 10 TABLET ORAL at 15:36

## 2018-06-08 RX ADMIN — DIGOXIN 125 MCG: 0.12 TABLET ORAL at 12:00

## 2018-06-08 RX ADMIN — CARBAMAZEPINE 100 MG: 100 SUSPENSION ORAL at 20:06

## 2018-06-08 RX ADMIN — DILTIAZEM HYDROCHLORIDE 30 MG: 30 TABLET, FILM COATED ORAL at 15:36

## 2018-06-08 NOTE — PLAN OF CARE
Problem: Patient Care Overview  Goal: Plan of Care Review  Outcome: Ongoing (interventions implemented as appropriate)      Problem: Fall Risk (Adult)  Goal: Absence of Fall  Outcome: Ongoing (interventions implemented as appropriate)

## 2018-06-08 NOTE — PLAN OF CARE
Problem: Patient Care Overview  Goal: Plan of Care Review  Outcome: Ongoing (interventions implemented as appropriate)  Patient remains on 40% Fi02 via T piece and requires frequent suctioning.

## 2018-06-08 NOTE — PLAN OF CARE
Problem: Patient Care Overview  Goal: Plan of Care Review  Patient had to increase Fi02 on tpiece to 50%. Frequent suctioning. PRN pain meds given one time overnight.    Problem: Pain, Acute (Adult)  Goal: Acceptable Pain Control/Comfort Level  Outcome: Ongoing (interventions implemented as appropriate)

## 2018-06-08 NOTE — PROGRESS NOTES
Continued Stay Note  [facility]     Patient Name: Brayden Lynn  MRN: [id number]  Today's Date: 6/8/2018    Admit Date: 5/29/2018          Discharge Plan     Row Name 06/08/18 1413       Plan    Plan Home with parents and [facility]    Plan Comments CCP spoke with pt's parents at bedside.  Discussed pt's current and prior needs and whether pt's parents are in need of any additional assistance caring for pt.  At this time, they feel that they will be able to return to their prior routine with [facility] providing HH. They have oxygen through [facility].  CCP will continue to follow for any increase in home needs.              Discharge Codes    No documentation.           Sarita Murillo RN
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
## 2018-06-09 LAB
ANION GAP SERPL CALCULATED.3IONS-SCNC: 9.2 MMOL/L
ARTERIAL PATENCY WRIST A: POSITIVE
ARTERIAL PATENCY WRIST A: POSITIVE
ATMOSPHERIC PRESS: 746.4 MMHG
ATMOSPHERIC PRESS: 746.8 MMHG
BASE EXCESS BLDA CALC-SCNC: 12.2 MMOL/L (ref 0–2)
BASE EXCESS BLDA CALC-SCNC: 12.5 MMOL/L (ref 0–2)
BASOPHILS # BLD AUTO: 0.01 10*3/MM3 (ref 0–0.2)
BASOPHILS NFR BLD AUTO: 0.1 % (ref 0–1.5)
BDY SITE: ABNORMAL
BDY SITE: ABNORMAL
BUN BLD-MCNC: 11 MG/DL (ref 6–20)
BUN/CREAT SERPL: 34.4 (ref 7–25)
CALCIUM SPEC-SCNC: 8.2 MG/DL (ref 8.6–10.5)
CHLORIDE SERPL-SCNC: 100 MMOL/L (ref 98–107)
CO2 SERPL-SCNC: 32.8 MMOL/L (ref 22–29)
CREAT BLD-MCNC: 0.32 MG/DL (ref 0.76–1.27)
DEPRECATED RDW RBC AUTO: 50.6 FL (ref 37–54)
EOSINOPHIL # BLD AUTO: 0.23 10*3/MM3 (ref 0–0.7)
EOSINOPHIL NFR BLD AUTO: 2.8 % (ref 0.3–6.2)
ERYTHROCYTE [DISTWIDTH] IN BLOOD BY AUTOMATED COUNT: 13.7 % (ref 11.5–14.5)
GFR SERPL CREATININE-BSD FRML MDRD: >150 ML/MIN/1.73
GLUCOSE BLD-MCNC: 117 MG/DL (ref 65–99)
GLUCOSE BLDC GLUCOMTR-MCNC: 114 MG/DL (ref 70–130)
GLUCOSE BLDC GLUCOMTR-MCNC: 114 MG/DL (ref 70–130)
GLUCOSE BLDC GLUCOMTR-MCNC: 116 MG/DL (ref 70–130)
GLUCOSE BLDC GLUCOMTR-MCNC: 117 MG/DL (ref 70–130)
GLUCOSE BLDC GLUCOMTR-MCNC: 143 MG/DL (ref 70–130)
HCO3 BLDA-SCNC: 38.8 MMOL/L (ref 22–28)
HCO3 BLDA-SCNC: 39 MMOL/L (ref 22–28)
HCT VFR BLD AUTO: 38.8 % (ref 40.4–52.2)
HGB BLD-MCNC: 12 G/DL (ref 13.7–17.6)
HOROWITZ INDEX BLD+IHG-RTO: 100 %
HOROWITZ INDEX BLD+IHG-RTO: 100 %
IMM GRANULOCYTES # BLD: 0 10*3/MM3 (ref 0–0.03)
IMM GRANULOCYTES NFR BLD: 0 % (ref 0–0.5)
LYMPHOCYTES # BLD AUTO: 0.89 10*3/MM3 (ref 0.9–4.8)
LYMPHOCYTES NFR BLD AUTO: 10.7 % (ref 19.6–45.3)
MCH RBC QN AUTO: 31.3 PG (ref 27–32.7)
MCHC RBC AUTO-ENTMCNC: 30.9 G/DL (ref 32.6–36.4)
MCV RBC AUTO: 101 FL (ref 79.8–96.2)
MODALITY: ABNORMAL
MODALITY: ABNORMAL
MONOCYTES # BLD AUTO: 0.84 10*3/MM3 (ref 0.2–1.2)
MONOCYTES NFR BLD AUTO: 10.1 % (ref 5–12)
NEUTROPHILS # BLD AUTO: 6.35 10*3/MM3 (ref 1.9–8.1)
NEUTROPHILS NFR BLD AUTO: 76.3 % (ref 42.7–76)
O2 A-A PPRESDIFF RESPIRATORY: 0.1 MMHG
O2 A-A PPRESDIFF RESPIRATORY: 0.4 MMHG
PCO2 BLDA: 55.8 MM HG (ref 35–45)
PCO2 BLDA: 59 MM HG (ref 35–45)
PEEP RESPIRATORY: 5 CM[H2O]
PH BLDA: 7.43 PH UNITS (ref 7.35–7.45)
PH BLDA: 7.45 PH UNITS (ref 7.35–7.45)
PLATELET # BLD AUTO: 182 10*3/MM3 (ref 140–500)
PMV BLD AUTO: 9.4 FL (ref 6–12)
PO2 BLDA: 262.9 MM HG (ref 80–100)
PO2 BLDA: 49.9 MM HG (ref 80–100)
POTASSIUM BLD-SCNC: 3.7 MMOL/L (ref 3.5–5.2)
RBC # BLD AUTO: 3.84 10*6/MM3 (ref 4.6–6)
SAO2 % BLDCOA: 85.5 % (ref 92–99)
SAO2 % BLDCOA: 99.9 % (ref 92–99)
SET MECH RESP RATE: 14
SET MECH RESP RATE: 22
SODIUM BLD-SCNC: 142 MMOL/L (ref 136–145)
TOTAL RATE: 14 BREATHS/MINUTE
VENTILATOR MODE: AC
VT ON VENT VENT: 450 ML
WBC NRBC COR # BLD: 8.32 10*3/MM3 (ref 4.5–10.7)

## 2018-06-09 PROCEDURE — 82803 BLOOD GASES ANY COMBINATION: CPT

## 2018-06-09 PROCEDURE — 82962 GLUCOSE BLOOD TEST: CPT

## 2018-06-09 PROCEDURE — 94799 UNLISTED PULMONARY SVC/PX: CPT

## 2018-06-09 PROCEDURE — 94002 VENT MGMT INPAT INIT DAY: CPT

## 2018-06-09 PROCEDURE — 85025 COMPLETE CBC W/AUTO DIFF WBC: CPT | Performed by: INTERNAL MEDICINE

## 2018-06-09 PROCEDURE — 25010000002 ENOXAPARIN PER 10 MG: Performed by: INTERNAL MEDICINE

## 2018-06-09 PROCEDURE — 80048 BASIC METABOLIC PNL TOTAL CA: CPT | Performed by: INTERNAL MEDICINE

## 2018-06-09 PROCEDURE — 36600 WITHDRAWAL OF ARTERIAL BLOOD: CPT

## 2018-06-09 PROCEDURE — 94668 MNPJ CHEST WALL SBSQ: CPT

## 2018-06-09 PROCEDURE — 25010000002 CEFTRIAXONE PER 250 MG: Performed by: INTERNAL MEDICINE

## 2018-06-09 RX ADMIN — CARBAMAZEPINE 100 MG: 100 SUSPENSION ORAL at 20:41

## 2018-06-09 RX ADMIN — SODIUM CHLORIDE 4 ML: 7 NEBU SOLN,3 % NEBU at 15:02

## 2018-06-09 RX ADMIN — IPRATROPIUM BROMIDE AND ALBUTEROL SULFATE 3 ML: .5; 3 SOLUTION RESPIRATORY (INHALATION) at 19:50

## 2018-06-09 RX ADMIN — BACLOFEN 10 MG: 10 TABLET ORAL at 09:03

## 2018-06-09 RX ADMIN — IPRATROPIUM BROMIDE AND ALBUTEROL SULFATE 3 ML: .5; 3 SOLUTION RESPIRATORY (INHALATION) at 07:23

## 2018-06-09 RX ADMIN — RISPERIDONE 1 MG: 1 SOLUTION ORAL at 18:42

## 2018-06-09 RX ADMIN — LEVETIRACETAM 750 MG: 100 SOLUTION ORAL at 05:47

## 2018-06-09 RX ADMIN — LEVETIRACETAM 750 MG: 100 SOLUTION ORAL at 18:42

## 2018-06-09 RX ADMIN — CETIRIZINE HYDROCHLORIDE 10 MG: 10 TABLET, FILM COATED ORAL at 09:03

## 2018-06-09 RX ADMIN — LEVOTHYROXINE SODIUM 50 MCG: 50 TABLET ORAL at 09:05

## 2018-06-09 RX ADMIN — ACETAMINOPHEN 650 MG: 325 TABLET ORAL at 09:18

## 2018-06-09 RX ADMIN — BACLOFEN 10 MG: 10 TABLET ORAL at 20:41

## 2018-06-09 RX ADMIN — SODIUM CHLORIDE 4 ML: 7 NEBU SOLN,3 % NEBU at 19:50

## 2018-06-09 RX ADMIN — DIGOXIN 125 MCG: 0.12 TABLET ORAL at 11:52

## 2018-06-09 RX ADMIN — RISPERIDONE 1 MG: 1 SOLUTION ORAL at 20:41

## 2018-06-09 RX ADMIN — CARBAMAZEPINE 100 MG: 100 SUSPENSION ORAL at 09:05

## 2018-06-09 RX ADMIN — ARIPIPRAZOLE 15 MG: 5 TABLET ORAL at 09:03

## 2018-06-09 RX ADMIN — CEFTRIAXONE SODIUM 1 G: 1 INJECTION, SOLUTION INTRAVENOUS at 11:51

## 2018-06-09 RX ADMIN — BACLOFEN 10 MG: 10 TABLET ORAL at 16:10

## 2018-06-09 RX ADMIN — SODIUM CHLORIDE 4 ML: 7 NEBU SOLN,3 % NEBU at 07:23

## 2018-06-09 RX ADMIN — RISPERIDONE 1 MG: 1 SOLUTION ORAL at 12:03

## 2018-06-09 RX ADMIN — ASPIRIN 325 MG: 325 TABLET ORAL at 09:02

## 2018-06-09 RX ADMIN — IPRATROPIUM BROMIDE AND ALBUTEROL SULFATE 3 ML: .5; 3 SOLUTION RESPIRATORY (INHALATION) at 15:02

## 2018-06-09 RX ADMIN — DILTIAZEM HYDROCHLORIDE 30 MG: 30 TABLET, FILM COATED ORAL at 16:10

## 2018-06-09 RX ADMIN — DILTIAZEM HYDROCHLORIDE 30 MG: 30 TABLET, FILM COATED ORAL at 20:41

## 2018-06-09 RX ADMIN — DILTIAZEM HYDROCHLORIDE 30 MG: 30 TABLET, FILM COATED ORAL at 09:05

## 2018-06-09 RX ADMIN — RISPERIDONE 1 MG: 1 SOLUTION ORAL at 09:06

## 2018-06-09 RX ADMIN — LANSOPRAZOLE 30 MG: KIT at 06:07

## 2018-06-09 RX ADMIN — ENOXAPARIN SODIUM 40 MG: 100 INJECTION SUBCUTANEOUS at 05:47

## 2018-06-09 RX ADMIN — IPRATROPIUM BROMIDE AND ALBUTEROL SULFATE 3 ML: .5; 3 SOLUTION RESPIRATORY (INHALATION) at 12:02

## 2018-06-09 NOTE — PLAN OF CARE
Problem: Patient Care Overview  Goal: Plan of Care Review  Outcome: Ongoing (interventions implemented as appropriate)   06/09/18 0619   Coping/Psychosocial   Plan of Care Reviewed With patient   OTHER   Outcome Summary Pt remains in CCU, on T-piece, tolerating tube feeds. VSS, no issues noted overnight. Continue to monitor.   Plan of Care Review   Progress no change       Problem: Fall Risk (Adult)  Goal: Absence of Fall  Outcome: Ongoing (interventions implemented as appropriate)      Problem: Skin Injury Risk (Adult)  Goal: Skin Health and Integrity  Outcome: Ongoing (interventions implemented as appropriate)      Problem: Infection, Risk/Actual (Adult)  Goal: Infection Prevention/Resolution  Outcome: Ongoing (interventions implemented as appropriate)      Problem: Nutrition, Enteral (Adult)  Goal: Signs and Symptoms of Listed Potential Problems Will be Absent, Minimized or Managed (Nutrition, Enteral)  Outcome: Ongoing (interventions implemented as appropriate)      Problem: Breathing Pattern Ineffective (Adult)  Goal: Anxiety/Fear Reduction  Outcome: Ongoing (interventions implemented as appropriate)      Problem: Pain, Acute (Adult)  Goal: Acceptable Pain Control/Comfort Level  Outcome: Ongoing (interventions implemented as appropriate)

## 2018-06-09 NOTE — PROGRESS NOTES
Dr. STEFANY Pandya    [facility]ON[facility]    6/9/2018    Patient ID:  Name:  Brayden Lynn  MRN:  [id number]  1960  57 y.o.  male            CC/Reason for visit: Follow-up for viral pneumonia, chronic respiratory failure, status post tracheostomy, traumatic brain injury    HPI: The patient is slowly improving.  He still has significant lower respiratory tract secretions      Vitals:  Vitals:    06/09/18 0724 06/09/18 0727 06/09/18 0733 06/09/18 0803   BP:    115/66   Pulse: 70 61  65   Resp: 22      Temp:   98 °F (36.7 °C)    TempSrc:   Oral    SpO2: 97% 96%  93%   Weight:       Height:         FiO2 (%): 40 %     Body mass index is 24.36 kg/m².    Intake/Output Summary (Last 24 hours) at 06/09/18 0904  Last data filed at 06/09/18 0535   Gross per 24 hour   Intake             1936 ml   Output             1185 ml   Net              751 ml       Exam:  GEN:  No distress,   Patient doesn't speak,   Tracheostomy in place, clean dry and intact  LUNGS: A few scattered rhonchi, shallow breathing, nonlabored breathing  CV:  Normal S1S2, without murmur, no edema  ABD:  Non tender, no enlarged liver or masses  EXT:  Contractures and some atrophy of muscles of both upper extremities      Scheduled meds:    ARIPiprazole 15 mg Per G Tube Daily   aspirin 325 mg Per G Tube Daily   baclofen 10 mg Per G Tube TID   carBAMazepine 100 mg Per G Tube BID   ceftriaxone 1 g Intravenous Q24H   cetirizine 10 mg Per G Tube Daily   digoxin 125 mcg Per G Tube Daily   diltiaZEM 30 mg Per G Tube TID   enoxaparin 40 mg Subcutaneous Q24H   fentaNYL citrate (PF) 100 mcg Intravenous Once   insulin aspart 0-7 Units Subcutaneous Q6H   ipratropium-albuterol 3 mL Nebulization 4x Daily - RT   lansoprazole 30 mg Nasogastric QAM   levETIRAcetam 750 mg Per G Tube Q12H   levothyroxine 50 mcg Per G Tube Daily   risperiDONE 1 mg Oral 4x Daily   sodium chloride 4 mL Nebulization TID - RT     IV meds:                           Data Review:   I  reviewed the patient's medications and new clinical results.  Lab Results   Component Value Date    CALCIUM 8.2 (L) 06/09/2018    MG 2.4 06/03/2018       Results from last 7 days  Lab Units 06/09/18  0318 06/08/18  0405 06/07/18  0439   SODIUM mmol/L 142 143 143   POTASSIUM mmol/L 3.7 3.7 3.8   CHLORIDE mmol/L 100 100 101   CO2 mmol/L 32.8* 33.6* 33.8*   BUN mg/dL 11 10 13   CREATININE mg/dL 0.32* 0.30* 0.40*   CALCIUM mg/dL 8.2* 8.5* 8.5*   GLUCOSE mg/dL 117* 112* 134*   WBC 10*3/mm3 8.32 7.41 8.05   HEMOGLOBIN g/dL 12.0* 12.5* 11.5*   PLATELETS 10*3/mm3 182 171 161                   Results from last 7 days  Lab Units 06/06/18  1201 06/05/18  0046   PH, ARTERIAL pH units 7.383 7.322*   PCO2, ARTERIAL mm Hg 57.6* 67.1*   PO2 ART mm Hg 80.2 61.5*   MODALITY  TTube TTube   O2 SATURATION CALC % 95.2 88.0*         ASSESSMENT:   Ac on chronic hypoxic resp failure (On nocturnal o2 only @ baseline)  Ac on chronic hypercapnic resp failure   Parainfluenza URTI   LLL atelectasis/ ? pna  Metabolic encephalopathy   H/o TBI s/p trach - capped usually   Dysphagia s/p PEG   Chronic ruiz - replaced   Ileus improving      PLAN:  The patient is stable enough to go to the medical floor, telemetry.  He will need frequent suctioning for his secretions.  Family is very attentive and they have taken care of him with tracheostomy for the last 12 years.  Continue ceftriaxone for aspiration pneumonia.        German Pandya MD  6/9/2018

## 2018-06-10 LAB
ANION GAP SERPL CALCULATED.3IONS-SCNC: 9.6 MMOL/L
BASOPHILS # BLD AUTO: 0.02 10*3/MM3 (ref 0–0.2)
BASOPHILS NFR BLD AUTO: 0.3 % (ref 0–1.5)
BUN BLD-MCNC: 15 MG/DL (ref 6–20)
BUN/CREAT SERPL: 30.6 (ref 7–25)
CALCIUM SPEC-SCNC: 8.6 MG/DL (ref 8.6–10.5)
CHLORIDE SERPL-SCNC: 99 MMOL/L (ref 98–107)
CO2 SERPL-SCNC: 32.4 MMOL/L (ref 22–29)
CREAT BLD-MCNC: 0.49 MG/DL (ref 0.76–1.27)
DEPRECATED RDW RBC AUTO: 51.7 FL (ref 37–54)
EOSINOPHIL # BLD AUTO: 0.17 10*3/MM3 (ref 0–0.7)
EOSINOPHIL NFR BLD AUTO: 2.3 % (ref 0.3–6.2)
ERYTHROCYTE [DISTWIDTH] IN BLOOD BY AUTOMATED COUNT: 14 % (ref 11.5–14.5)
GFR SERPL CREATININE-BSD FRML MDRD: >150 ML/MIN/1.73
GLUCOSE BLD-MCNC: 140 MG/DL (ref 65–99)
GLUCOSE BLDC GLUCOMTR-MCNC: 136 MG/DL (ref 70–130)
GLUCOSE BLDC GLUCOMTR-MCNC: 96 MG/DL (ref 70–130)
HCT VFR BLD AUTO: 37.8 % (ref 40.4–52.2)
HGB BLD-MCNC: 11.8 G/DL (ref 13.7–17.6)
IMM GRANULOCYTES # BLD: 0 10*3/MM3 (ref 0–0.03)
IMM GRANULOCYTES NFR BLD: 0 % (ref 0–0.5)
INR PPP: 1.18 (ref 0.9–1.1)
LYMPHOCYTES # BLD AUTO: 1.03 10*3/MM3 (ref 0.9–4.8)
LYMPHOCYTES NFR BLD AUTO: 14 % (ref 19.6–45.3)
MCH RBC QN AUTO: 31.6 PG (ref 27–32.7)
MCHC RBC AUTO-ENTMCNC: 31.2 G/DL (ref 32.6–36.4)
MCV RBC AUTO: 101.1 FL (ref 79.8–96.2)
MONOCYTES # BLD AUTO: 0.67 10*3/MM3 (ref 0.2–1.2)
MONOCYTES NFR BLD AUTO: 9.1 % (ref 5–12)
NEUTROPHILS # BLD AUTO: 5.49 10*3/MM3 (ref 1.9–8.1)
NEUTROPHILS NFR BLD AUTO: 74.3 % (ref 42.7–76)
NT-PROBNP SERPL-MCNC: 38.7 PG/ML (ref 5–900)
PHOSPHATE SERPL-MCNC: 3.4 MG/DL (ref 2.5–4.5)
PLATELET # BLD AUTO: 191 10*3/MM3 (ref 140–500)
PMV BLD AUTO: 9.2 FL (ref 6–12)
POTASSIUM BLD-SCNC: 3.6 MMOL/L (ref 3.5–5.2)
PROCALCITONIN SERPL-MCNC: 0.05 NG/ML (ref 0.1–0.25)
PROTHROMBIN TIME: 14.8 SECONDS (ref 11.7–14.2)
RBC # BLD AUTO: 3.74 10*6/MM3 (ref 4.6–6)
SODIUM BLD-SCNC: 141 MMOL/L (ref 136–145)
WBC NRBC COR # BLD: 7.38 10*3/MM3 (ref 4.5–10.7)

## 2018-06-10 PROCEDURE — 94799 UNLISTED PULMONARY SVC/PX: CPT

## 2018-06-10 PROCEDURE — 80048 BASIC METABOLIC PNL TOTAL CA: CPT | Performed by: INTERNAL MEDICINE

## 2018-06-10 PROCEDURE — 84100 ASSAY OF PHOSPHORUS: CPT | Performed by: INTERNAL MEDICINE

## 2018-06-10 PROCEDURE — 85025 COMPLETE CBC W/AUTO DIFF WBC: CPT | Performed by: INTERNAL MEDICINE

## 2018-06-10 PROCEDURE — 25010000002 ENOXAPARIN PER 10 MG: Performed by: INTERNAL MEDICINE

## 2018-06-10 PROCEDURE — 25010000002 FENTANYL CITRATE (PF) 100 MCG/2ML SOLUTION: Performed by: INTERNAL MEDICINE

## 2018-06-10 PROCEDURE — 94668 MNPJ CHEST WALL SBSQ: CPT

## 2018-06-10 PROCEDURE — 94003 VENT MGMT INPAT SUBQ DAY: CPT

## 2018-06-10 PROCEDURE — 84145 PROCALCITONIN (PCT): CPT | Performed by: INTERNAL MEDICINE

## 2018-06-10 PROCEDURE — 83880 ASSAY OF NATRIURETIC PEPTIDE: CPT | Performed by: INTERNAL MEDICINE

## 2018-06-10 PROCEDURE — 25010000002 CEFTRIAXONE PER 250 MG: Performed by: INTERNAL MEDICINE

## 2018-06-10 PROCEDURE — 5A1945Z RESPIRATORY VENTILATION, 24-96 CONSECUTIVE HOURS: ICD-10-PCS | Performed by: INTERNAL MEDICINE

## 2018-06-10 PROCEDURE — 85610 PROTHROMBIN TIME: CPT | Performed by: INTERNAL MEDICINE

## 2018-06-10 PROCEDURE — 0BH17EZ INSERTION OF ENDOTRACHEAL AIRWAY INTO TRACHEA, VIA NATURAL OR ARTIFICIAL OPENING: ICD-10-PCS | Performed by: INTERNAL MEDICINE

## 2018-06-10 PROCEDURE — 82962 GLUCOSE BLOOD TEST: CPT

## 2018-06-10 RX ORDER — ALBUTEROL SULFATE 90 UG/1
6 AEROSOL, METERED RESPIRATORY (INHALATION)
Status: DISCONTINUED | OUTPATIENT
Start: 2018-06-10 | End: 2018-06-10

## 2018-06-10 RX ORDER — RISPERIDONE 1 MG/1
1 TABLET ORAL 4 TIMES DAILY
Status: DISCONTINUED | OUTPATIENT
Start: 2018-06-10 | End: 2018-06-10

## 2018-06-10 RX ORDER — RISPERIDONE 0.25 MG/1
0.25 TABLET ORAL 4 TIMES DAILY
Status: DISCONTINUED | OUTPATIENT
Start: 2018-06-10 | End: 2018-06-11

## 2018-06-10 RX ORDER — BACLOFEN 10 MG/1
5 TABLET ORAL 3 TIMES DAILY
Status: DISCONTINUED | OUTPATIENT
Start: 2018-06-10 | End: 2018-06-13

## 2018-06-10 RX ADMIN — RISPERIDONE 1 MG: 1 TABLET, FILM COATED ORAL at 09:10

## 2018-06-10 RX ADMIN — RISPERIDONE 0.25 MG: 1 TABLET, FILM COATED ORAL at 20:04

## 2018-06-10 RX ADMIN — ARIPIPRAZOLE 15 MG: 5 TABLET ORAL at 09:05

## 2018-06-10 RX ADMIN — DILTIAZEM HYDROCHLORIDE 30 MG: 30 TABLET, FILM COATED ORAL at 20:04

## 2018-06-10 RX ADMIN — BACLOFEN 5 MG: 10 TABLET ORAL at 17:07

## 2018-06-10 RX ADMIN — CEFTRIAXONE SODIUM 1 G: 1 INJECTION, SOLUTION INTRAVENOUS at 13:02

## 2018-06-10 RX ADMIN — CARBAMAZEPINE 100 MG: 100 SUSPENSION ORAL at 09:05

## 2018-06-10 RX ADMIN — FENTANYL CITRATE 12.5 MCG: 50 INJECTION, SOLUTION INTRAMUSCULAR; INTRAVENOUS at 15:58

## 2018-06-10 RX ADMIN — LEVETIRACETAM 750 MG: 100 SOLUTION ORAL at 06:02

## 2018-06-10 RX ADMIN — LEVETIRACETAM 750 MG: 100 SOLUTION ORAL at 17:08

## 2018-06-10 RX ADMIN — ASPIRIN 325 MG: 325 TABLET ORAL at 09:04

## 2018-06-10 RX ADMIN — RISPERIDONE 0.25 MG: 1 TABLET, FILM COATED ORAL at 13:02

## 2018-06-10 RX ADMIN — ACETAMINOPHEN 650 MG: 325 TABLET ORAL at 20:04

## 2018-06-10 RX ADMIN — ENOXAPARIN SODIUM 40 MG: 100 INJECTION SUBCUTANEOUS at 06:02

## 2018-06-10 RX ADMIN — POTASSIUM CHLORIDE 40 MEQ: 1.5 POWDER, FOR SOLUTION ORAL at 20:04

## 2018-06-10 RX ADMIN — BACLOFEN 10 MG: 10 TABLET ORAL at 09:06

## 2018-06-10 RX ADMIN — BACLOFEN 5 MG: 10 TABLET ORAL at 20:04

## 2018-06-10 RX ADMIN — CETIRIZINE HYDROCHLORIDE 10 MG: 10 TABLET, FILM COATED ORAL at 09:04

## 2018-06-10 RX ADMIN — DILTIAZEM HYDROCHLORIDE 30 MG: 30 TABLET, FILM COATED ORAL at 09:05

## 2018-06-10 RX ADMIN — FENTANYL CITRATE 12.5 MCG: 50 INJECTION, SOLUTION INTRAMUSCULAR; INTRAVENOUS at 02:15

## 2018-06-10 RX ADMIN — LANSOPRAZOLE 30 MG: KIT at 06:02

## 2018-06-10 RX ADMIN — RISPERIDONE 0.25 MG: 1 TABLET, FILM COATED ORAL at 17:08

## 2018-06-10 RX ADMIN — DILTIAZEM HYDROCHLORIDE 30 MG: 30 TABLET, FILM COATED ORAL at 17:07

## 2018-06-10 RX ADMIN — LEVOTHYROXINE SODIUM 50 MCG: 50 TABLET ORAL at 09:06

## 2018-06-10 RX ADMIN — CARBAMAZEPINE 100 MG: 100 SUSPENSION ORAL at 20:04

## 2018-06-10 NOTE — PROGRESS NOTES
Patient sat was 84% on 50% tpiece upon entering room, patient given breathing treamtent, suctioned/lavaged to make sure patient did not have plug. pleth good on monitor and did try different sites for oxygen sat that matched up. Patient also given CPT. Patient came up to 88% after turned up to 100%/12LpM flow on tpiece and patient also on 9LPM breathing treatment. ABG drawn on 100% while on breathing treament due to increased oxygen requirements. Patient ABG matches up to patient status. Patient seems very lethargic now and sat is 88-90% on 100%. RN given results and calling MD.

## 2018-06-10 NOTE — PROGRESS NOTES
Dr. STEFANY Pandya    [facility] CORONARY CARE    6/10/2018    Patient ID:  Name:  Brayden Lynn  MRN:  [id number]  1960  57 y.o.  male            CC/Reason for visit: Follow-up for viral pneumonia, chronic respiratory failure, status post tracheostomy, traumatic brain injury       HPI: Patient still having abundant secretions.  Last night he became hypoxic on 50% T piece, requiring reconnection to the mechanical ventilator via his tracheostomy.  Requiring suctioning of airways every hour or 2 hours.  Now back on mechanical ventilation.  Has to remain in the ICU for now.    Mechanical ventilator settings were reviewed and adjusted by me.    Vitals:  Vitals:    06/10/18 0605 06/10/18 0700 06/10/18 0803 06/10/18 0817   BP: 94/57  115/66    Pulse: 52  (!) 48 81   Resp:    18   Temp:  98.4 °F (36.9 °C)     TempSrc:       SpO2: 97%  98% 98%   Weight:       Height:         FiO2 (%): 30 %     Body mass index is 24.3 kg/m².    Intake/Output Summary (Last 24 hours) at 06/10/18 1030  Last data filed at 06/10/18 0600   Gross per 24 hour   Intake              547 ml   Output             1250 ml   Net             -703 ml       Exam:  GEN:  No distress, Asleep, arouses but doesn't follow commands for me today  EYES:   EOM-i, anicteric sclera bilat  ENT:    External ears/nose normal, OP clear  NECK:  Tracheostomy is clean, dry and intact  LUNGS: Scattered rhonchi bilaterally nonlabored breathing  CV:  Normal S1S2, without murmur, no edema  ABD:  Non tender, no enlarged liver or masses  EXT:  No cyanosis or clubbing    Scheduled meds:    ARIPiprazole 15 mg Per G Tube Daily   aspirin 325 mg Per G Tube Daily   baclofen 10 mg Per G Tube TID   carBAMazepine 100 mg Per G Tube BID   ceftriaxone 1 g Intravenous Q24H   cetirizine 10 mg Per G Tube Daily   digoxin 125 mcg Per G Tube Daily   diltiaZEM 30 mg Per G Tube TID   enoxaparin 40 mg Subcutaneous Q24H   fentaNYL citrate (PF) 100 mcg Intravenous Once   insulin aspart 0-7  Units Subcutaneous Q6H   ipratropium-albuterol 3 mL Nebulization 4x Daily - RT   lansoprazole 30 mg Nasogastric QAM   levETIRAcetam 750 mg Per G Tube Q12H   levothyroxine 50 mcg Per G Tube Daily   risperiDONE 1 mg Per G Tube 4x Daily     IV meds:                           Data Review:   I reviewed the patient's medications and new clinical results.  Lab Results   Component Value Date    CALCIUM 8.6 06/10/2018    PHOS 3.4 06/10/2018    MG 2.4 06/03/2018       Results from last 7 days  Lab Units 06/10/18  0356 06/09/18  0318 06/08/18  0405   SODIUM mmol/L 141 142 143   POTASSIUM mmol/L 3.6 3.7 3.7   CHLORIDE mmol/L 99 100 100   CO2 mmol/L 32.4* 32.8* 33.6*   BUN mg/dL 15 11 10   CREATININE mg/dL 0.49* 0.32* 0.30*   CALCIUM mg/dL 8.6 8.2* 8.5*   GLUCOSE mg/dL 140* 117* 112*   WBC 10*3/mm3 7.38 8.32 7.41   HEMOGLOBIN g/dL 11.8* 12.0* 12.5*   PLATELETS 10*3/mm3 191 182 171   INR  1.18*  --   --    PROBNP pg/mL 38.7  --   --    PROCALCITONIN ng/mL 0.05*  --   --                      Results from last 7 days  Lab Units 06/09/18  2121 06/09/18 2012 06/06/18  1201 06/05/18  0046   PH, ARTERIAL pH units 7.428 7.450 7.383 7.322*   PCO2, ARTERIAL mm Hg 59.0* 55.8* 57.6* 67.1*   PO2 ART mm Hg 262.9* 49.9* 80.2 61.5*   MODALITY  Adult Vent TTube TTube TTube   O2 SATURATION CALC % 99.9* 85.5* 95.2 88.0*           ASSESSMENT:   Ac on chronic hypoxic resp failure   Ac on chronic hypercapnic resp failure   Parainfluenza bronchitis  LLL atelectasis/ ? pna  Metabolic encephalopathy   H/o TBI s/p trach - capped usually   Dysphagia s/p PEG   Chronic ruiz - replaced   Ileus improving      PLAN:  Patient's respiratory status worsened last night.  He is now back on mechanical ventilation.  Has abundant respiratory secretions which require frequent suctioning.  He is not ready for weaning from mechanical ventilation at this time.  He will have to remain in CCU for now.  Repeat chest x-ray in the morning.  Decrease some of his muscle  relaxant and antipsychotic doses because of significant weakness and increased work of breathing yesterday requiring reconnection to mechanical ventilation.  Stop acid suppression since the patient is now on tube feeding which has GI protective effect.    Continue current care        German Pandya MD  6/10/2018

## 2018-06-10 NOTE — PLAN OF CARE
Problem: Patient Care Overview  Goal: Plan of Care Review   06/10/18 0611   Coping/Psychosocial   Plan of Care Reviewed With caregiver;father   OTHER   Outcome Summary Patient had an increased work of breathing overnight- ABGs drawn and placed back on vent per Estermikey. Father aware. Resting comfortably since then. VSS. Low u/o overnight.    Plan of Care Review   Progress declining       Problem: Fall Risk (Adult)  Goal: Absence of Fall  Outcome: Ongoing (interventions implemented as appropriate)      Problem: Skin Injury Risk (Adult)  Goal: Skin Health and Integrity  Outcome: Ongoing (interventions implemented as appropriate)      Problem: Nutrition, Enteral (Adult)  Goal: Signs and Symptoms of Listed Potential Problems Will be Absent, Minimized or Managed (Nutrition, Enteral)  Outcome: Ongoing (interventions implemented as appropriate)      Problem: Breathing Pattern Ineffective (Adult)  Goal: Effective Oxygenation/Ventilation  Outcome: Ongoing (interventions implemented as appropriate)    Goal: Anxiety/Fear Reduction  Outcome: Ongoing (interventions implemented as appropriate)      Problem: Ventilation, Mechanical Invasive (Adult)  Goal: Signs and Symptoms of Listed Potential Problems Will be Absent, Minimized or Managed (Ventilation, Mechanical Invasive)  Outcome: Ongoing (interventions implemented as appropriate)      Problem: Pain, Acute (Adult)  Goal: Acceptable Pain Control/Comfort Level  Outcome: Ongoing (interventions implemented as appropriate)

## 2018-06-11 ENCOUNTER — APPOINTMENT (OUTPATIENT)
Dept: GENERAL RADIOLOGY | Facility: HOSPITAL | Age: 58
End: 2018-06-11

## 2018-06-11 LAB
ANION GAP SERPL CALCULATED.3IONS-SCNC: 11.3 MMOL/L
BASOPHILS # BLD AUTO: 0.03 10*3/MM3 (ref 0–0.2)
BASOPHILS NFR BLD AUTO: 0.4 % (ref 0–1.5)
BUN BLD-MCNC: 14 MG/DL (ref 6–20)
BUN/CREAT SERPL: 34.1 (ref 7–25)
CALCIUM SPEC-SCNC: 8.7 MG/DL (ref 8.6–10.5)
CHLORIDE SERPL-SCNC: 97 MMOL/L (ref 98–107)
CO2 SERPL-SCNC: 30.7 MMOL/L (ref 22–29)
CREAT BLD-MCNC: 0.41 MG/DL (ref 0.76–1.27)
DEPRECATED RDW RBC AUTO: 50.5 FL (ref 37–54)
DIGOXIN SERPL-MCNC: <0.3 NG/ML (ref 0.6–1.2)
EOSINOPHIL # BLD AUTO: 0.21 10*3/MM3 (ref 0–0.7)
EOSINOPHIL NFR BLD AUTO: 3.1 % (ref 0.3–6.2)
ERYTHROCYTE [DISTWIDTH] IN BLOOD BY AUTOMATED COUNT: 14.1 % (ref 11.5–14.5)
GFR SERPL CREATININE-BSD FRML MDRD: >150 ML/MIN/1.73
GLUCOSE BLD-MCNC: 104 MG/DL (ref 65–99)
GLUCOSE BLDC GLUCOMTR-MCNC: 107 MG/DL (ref 70–130)
GLUCOSE BLDC GLUCOMTR-MCNC: 119 MG/DL (ref 70–130)
GLUCOSE BLDC GLUCOMTR-MCNC: 122 MG/DL (ref 70–130)
HCT VFR BLD AUTO: 35.8 % (ref 40.4–52.2)
HGB BLD-MCNC: 11.6 G/DL (ref 13.7–17.6)
IMM GRANULOCYTES # BLD: 0.02 10*3/MM3 (ref 0–0.03)
IMM GRANULOCYTES NFR BLD: 0.3 % (ref 0–0.5)
LYMPHOCYTES # BLD AUTO: 1.72 10*3/MM3 (ref 0.9–4.8)
LYMPHOCYTES NFR BLD AUTO: 25.7 % (ref 19.6–45.3)
MAGNESIUM SERPL-MCNC: 2.2 MG/DL (ref 1.6–2.6)
MCH RBC QN AUTO: 31.9 PG (ref 27–32.7)
MCHC RBC AUTO-ENTMCNC: 32.4 G/DL (ref 32.6–36.4)
MCV RBC AUTO: 98.4 FL (ref 79.8–96.2)
MONOCYTES # BLD AUTO: 0.47 10*3/MM3 (ref 0.2–1.2)
MONOCYTES NFR BLD AUTO: 7 % (ref 5–12)
NEUTROPHILS # BLD AUTO: 4.25 10*3/MM3 (ref 1.9–8.1)
NEUTROPHILS NFR BLD AUTO: 63.8 % (ref 42.7–76)
NT-PROBNP SERPL-MCNC: 22.9 PG/ML (ref 5–900)
PHOSPHATE SERPL-MCNC: 4.1 MG/DL (ref 2.5–4.5)
PLATELET # BLD AUTO: 227 10*3/MM3 (ref 140–500)
PMV BLD AUTO: 8.8 FL (ref 6–12)
POTASSIUM BLD-SCNC: 3.6 MMOL/L (ref 3.5–5.2)
PROCALCITONIN SERPL-MCNC: 0.04 NG/ML (ref 0.1–0.25)
RBC # BLD AUTO: 3.64 10*6/MM3 (ref 4.6–6)
SODIUM BLD-SCNC: 139 MMOL/L (ref 136–145)
WBC NRBC COR # BLD: 6.68 10*3/MM3 (ref 4.5–10.7)

## 2018-06-11 PROCEDURE — 94799 UNLISTED PULMONARY SVC/PX: CPT

## 2018-06-11 PROCEDURE — 82962 GLUCOSE BLOOD TEST: CPT

## 2018-06-11 PROCEDURE — 83735 ASSAY OF MAGNESIUM: CPT | Performed by: INTERNAL MEDICINE

## 2018-06-11 PROCEDURE — 94003 VENT MGMT INPAT SUBQ DAY: CPT

## 2018-06-11 PROCEDURE — 25010000002 CEFTRIAXONE PER 250 MG: Performed by: INTERNAL MEDICINE

## 2018-06-11 PROCEDURE — 25010000002 ENOXAPARIN PER 10 MG: Performed by: INTERNAL MEDICINE

## 2018-06-11 PROCEDURE — 80162 ASSAY OF DIGOXIN TOTAL: CPT | Performed by: INTERNAL MEDICINE

## 2018-06-11 PROCEDURE — 94668 MNPJ CHEST WALL SBSQ: CPT

## 2018-06-11 PROCEDURE — 85025 COMPLETE CBC W/AUTO DIFF WBC: CPT | Performed by: INTERNAL MEDICINE

## 2018-06-11 PROCEDURE — 25010000002 FENTANYL CITRATE (PF) 100 MCG/2ML SOLUTION: Performed by: INTERNAL MEDICINE

## 2018-06-11 PROCEDURE — 84100 ASSAY OF PHOSPHORUS: CPT | Performed by: INTERNAL MEDICINE

## 2018-06-11 PROCEDURE — 84145 PROCALCITONIN (PCT): CPT | Performed by: INTERNAL MEDICINE

## 2018-06-11 PROCEDURE — 83880 ASSAY OF NATRIURETIC PEPTIDE: CPT | Performed by: INTERNAL MEDICINE

## 2018-06-11 PROCEDURE — 80048 BASIC METABOLIC PNL TOTAL CA: CPT | Performed by: INTERNAL MEDICINE

## 2018-06-11 PROCEDURE — 71045 X-RAY EXAM CHEST 1 VIEW: CPT

## 2018-06-11 RX ORDER — TRAMADOL HYDROCHLORIDE 50 MG/1
50 TABLET ORAL EVERY 6 HOURS PRN
Status: DISCONTINUED | OUTPATIENT
Start: 2018-06-11 | End: 2018-06-15 | Stop reason: HOSPADM

## 2018-06-11 RX ORDER — RISPERIDONE 1 MG/1
1 TABLET ORAL EVERY 12 HOURS SCHEDULED
Status: DISCONTINUED | OUTPATIENT
Start: 2018-06-11 | End: 2018-06-15 | Stop reason: HOSPADM

## 2018-06-11 RX ADMIN — POTASSIUM CHLORIDE 40 MEQ: 1.5 POWDER, FOR SOLUTION ORAL at 05:36

## 2018-06-11 RX ADMIN — LEVOTHYROXINE SODIUM 50 MCG: 50 TABLET ORAL at 08:25

## 2018-06-11 RX ADMIN — BACLOFEN 5 MG: 10 TABLET ORAL at 22:06

## 2018-06-11 RX ADMIN — ENOXAPARIN SODIUM 40 MG: 100 INJECTION SUBCUTANEOUS at 05:09

## 2018-06-11 RX ADMIN — BACLOFEN 5 MG: 10 TABLET ORAL at 08:25

## 2018-06-11 RX ADMIN — FENTANYL CITRATE 12.5 MCG: 50 INJECTION, SOLUTION INTRAMUSCULAR; INTRAVENOUS at 03:36

## 2018-06-11 RX ADMIN — DILTIAZEM HYDROCHLORIDE 30 MG: 30 TABLET, FILM COATED ORAL at 16:20

## 2018-06-11 RX ADMIN — BACLOFEN 5 MG: 10 TABLET ORAL at 16:20

## 2018-06-11 RX ADMIN — LEVETIRACETAM 750 MG: 100 SOLUTION ORAL at 05:08

## 2018-06-11 RX ADMIN — DILTIAZEM HYDROCHLORIDE 30 MG: 30 TABLET, FILM COATED ORAL at 22:06

## 2018-06-11 RX ADMIN — DILTIAZEM HYDROCHLORIDE 30 MG: 30 TABLET, FILM COATED ORAL at 08:25

## 2018-06-11 RX ADMIN — ARIPIPRAZOLE 15 MG: 5 TABLET ORAL at 08:25

## 2018-06-11 RX ADMIN — CEFTRIAXONE SODIUM 1 G: 1 INJECTION, SOLUTION INTRAVENOUS at 12:06

## 2018-06-11 RX ADMIN — LEVETIRACETAM 750 MG: 100 SOLUTION ORAL at 17:25

## 2018-06-11 RX ADMIN — RISPERIDONE 1 MG: 1 TABLET, FILM COATED ORAL at 20:54

## 2018-06-11 RX ADMIN — RISPERIDONE 0.25 MG: 1 TABLET, FILM COATED ORAL at 08:25

## 2018-06-11 RX ADMIN — POTASSIUM CHLORIDE 40 MEQ: 1.5 POWDER, FOR SOLUTION ORAL at 10:06

## 2018-06-11 RX ADMIN — CARBAMAZEPINE 100 MG: 100 SUSPENSION ORAL at 08:25

## 2018-06-11 RX ADMIN — ASPIRIN 325 MG: 325 TABLET ORAL at 08:25

## 2018-06-11 RX ADMIN — TRAMADOL HYDROCHLORIDE 50 MG: 50 TABLET, FILM COATED ORAL at 22:29

## 2018-06-11 RX ADMIN — CARBAMAZEPINE 100 MG: 100 SUSPENSION ORAL at 20:54

## 2018-06-11 NOTE — PLAN OF CARE
Problem: Patient Care Overview  Goal: Plan of Care Review   06/11/18 0620   Coping/Psychosocial   Plan of Care Reviewed With patient   OTHER   Outcome Summary Patient remained on ventilator all night- frequent suctioning and BMs. Plan to try to wean vent today- potassium replaced. Will continue to monitor.    Plan of Care Review   Progress improving       Problem: Fall Risk (Adult)  Goal: Absence of Fall  Outcome: Ongoing (interventions implemented as appropriate)      Problem: Breathing Pattern Ineffective (Adult)  Goal: Effective Oxygenation/Ventilation  Outcome: Ongoing (interventions implemented as appropriate)      Problem: Ventilation, Mechanical Invasive (Adult)  Goal: Signs and Symptoms of Listed Potential Problems Will be Absent, Minimized or Managed (Ventilation, Mechanical Invasive)  Outcome: Ongoing (interventions implemented as appropriate)      Problem: Pain, Acute (Adult)  Goal: Identify Related Risk Factors and Signs and Symptoms  Outcome: Outcome(s) achieved Date Met: 06/11/18    Goal: Acceptable Pain Control/Comfort Level  Outcome: Ongoing (interventions implemented as appropriate)

## 2018-06-11 NOTE — PROGRESS NOTES
Dr. STEFANY Pandya    [facility] CORONARY CARE    6/11/2018    Patient ID:  Name:  Brayden Lynn  MRN:  [id number]  1960  57 y.o.  male              CC/Reason for visit: Follow-up for viral pneumonia, chronic respiratory failure, status post tracheostomy, traumatic brain injury     HPI: Patient is more awake.  He is restless, however before that he was completely sedated and asleep the entire 24 hours of the day.      Vitals:  Vitals:    06/11/18 0455 06/11/18 0605 06/11/18 0728 06/11/18 0751   BP:  132/76     Pulse:  63  75   Resp:    22   Temp:   97.7 °F (36.5 °C)    TempSrc:   Oral    SpO2:  92%  95%   Weight: 80.6 kg (177 lb 11.1 oz)      Height:         FiO2 (%): 30 %     Body mass index is 24.09 kg/m².    Intake/Output Summary (Last 24 hours) at 06/11/18 0920  Last data filed at 06/11/18 0457   Gross per 24 hour   Intake             1858 ml   Output              825 ml   Net             1033 ml       Exam:  GEN:                awake, fidgety, doesn't follow commands.  EYES:              EOM-i, anicteric sclera bilat  ENT:                External ears/nose normal, OP clear  NECK:             Tracheostomy is clean, dry and intact  LUNGS:           Scattered rhonchi bilaterally nonlabored breathing  CV:                  Normal S1S2, without murmur, 1+ edema  ABD:                Non tender, no enlarged liver or masses  EXT:                No cyanosis or clubbing      Scheduled meds:    ARIPiprazole 15 mg Per G Tube Daily   aspirin 325 mg Per G Tube Daily   baclofen 5 mg Per G Tube TID   carBAMazepine 100 mg Per G Tube BID   ceftriaxone 1 g Intravenous Q24H   digoxin 125 mcg Per G Tube Daily   diltiaZEM 30 mg Per G Tube TID   enoxaparin 40 mg Subcutaneous Q24H   insulin aspart 0-7 Units Subcutaneous Q6H   levETIRAcetam 750 mg Per G Tube Q12H   levothyroxine 50 mcg Per G Tube Daily   risperiDONE 0.25 mg Per G Tube 4x Daily     IV meds:                           Data Review:   I reviewed the patient's  medications and new clinical results.  Lab Results   Component Value Date    CALCIUM 8.7 06/11/2018    PHOS 4.1 06/11/2018    MG 2.2 06/11/2018    MG 2.4 06/03/2018       Results from last 7 days  Lab Units 06/11/18  0345 06/11/18  0344 06/10/18  0356 06/09/18  0318   SODIUM mmol/L  --  139 141 142   POTASSIUM mmol/L  --  3.6 3.6 3.7   CHLORIDE mmol/L  --  97* 99 100   CO2 mmol/L  --  30.7* 32.4* 32.8*   BUN mg/dL  --  14 15 11   CREATININE mg/dL  --  0.41* 0.49* 0.32*   CALCIUM mg/dL  --  8.7 8.6 8.2*   GLUCOSE mg/dL  --  104* 140* 117*   WBC 10*3/mm3 6.68  --  7.38 8.32   HEMOGLOBIN g/dL 11.6*  --  11.8* 12.0*   PLATELETS 10*3/mm3 227  --  191 182   INR   --   --  1.18*  --    PROBNP pg/mL  --  22.9 38.7  --    PROCALCITONIN ng/mL  --  0.04* 0.05*  --                      Results from last 7 days  Lab Units 06/09/18  2121 06/09/18 2012 06/06/18  1201 06/05/18  0046   PH, ARTERIAL pH units 7.428 7.450 7.383 7.322*   PCO2, ARTERIAL mm Hg 59.0* 55.8* 57.6* 67.1*   PO2 ART mm Hg 262.9* 49.9* 80.2 61.5*   MODALITY  Adult Vent TTube TTube TTube   O2 SATURATION CALC % 99.9* 85.5* 95.2 88.0*           ASSESSMENT:   Ac on chronic hypoxic resp failure   Ac on chronic hypercapnic resp failure   Parainfluenza bronchitis  LLL atelectasis/ ? pna  Metabolic encephalopathy   H/o TBI s/p trach - capped usually   Dysphagia s/p PEG   Chronic ruiz - replaced   Ileus improving      PLAN:  Continue titrating antipsychotics, muscle relaxants and sedatives to achieve an awake but calm patient.  I do not believe it is clinically appropriate to have this patient asleep or sedated most hours of the day.  He has underlying traumatic brain injury, and will never be completely calm and following commands, but sedated 24 hours a day is also not desirable.    Continue anticonvulsants to prevent seizure in this person with history of traumatic brain injury with seizures.    Continue pulmonary toilet for viral bronchopneumonia with abundant  respiratory secretions.    Continue tube feeds.    Participate with physical therapy to the extent possible.            German Pandya MD  6/11/2018

## 2018-06-11 NOTE — PLAN OF CARE
Problem: Patient Care Overview  Goal: Plan of Care Review  Outcome: Ongoing (interventions implemented as appropriate)   06/11/18 0460   Coping/Psychosocial   Plan of Care Reviewed With family   OTHER   Outcome Summary Pt remains in CCU. Pt on pressure support on ventilator since early this AM. Plan to remain on pressure support overnight and if pt does well, may extubate tomorrow. Potassium replaced per protocol. Pt had BM x 2 this shift. Pt continues to require suctioning every 2 hours. VSS. Will continue to monitor.    Plan of Care Review   Progress improving       Problem: Fall Risk (Adult)  Goal: Absence of Fall  Outcome: Ongoing (interventions implemented as appropriate)      Problem: Skin Injury Risk (Adult)  Goal: Skin Health and Integrity  Outcome: Ongoing (interventions implemented as appropriate)      Problem: Infection, Risk/Actual (Adult)  Goal: Infection Prevention/Resolution  Outcome: Ongoing (interventions implemented as appropriate)      Problem: Nutrition, Enteral (Adult)  Goal: Signs and Symptoms of Listed Potential Problems Will be Absent, Minimized or Managed (Nutrition, Enteral)  Outcome: Ongoing (interventions implemented as appropriate)      Problem: Breathing Pattern Ineffective (Adult)  Goal: Effective Oxygenation/Ventilation  Outcome: Ongoing (interventions implemented as appropriate)    Goal: Anxiety/Fear Reduction  Outcome: Ongoing (interventions implemented as appropriate)

## 2018-06-11 NOTE — PLAN OF CARE
Problem: Patient Care Overview  Goal: Plan of Care Review  Outcome: Ongoing (interventions implemented as appropriate)  Patient still on ventilator with frequent suctioning

## 2018-06-11 NOTE — PLAN OF CARE
Problem: Ventilation, Mechanical Invasive (Adult)  Goal: Signs and Symptoms of Listed Potential Problems Will be Absent, Minimized or Managed (Ventilation, Mechanical Invasive)  Outcome: Ongoing (interventions implemented as appropriate)  Currently attempting PS for SBT at this time

## 2018-06-12 LAB
ANION GAP SERPL CALCULATED.3IONS-SCNC: 13.2 MMOL/L
BASOPHILS # BLD AUTO: 0.03 10*3/MM3 (ref 0–0.2)
BASOPHILS NFR BLD AUTO: 0.4 % (ref 0–1.5)
BUN BLD-MCNC: 12 MG/DL (ref 6–20)
BUN/CREAT SERPL: 30 (ref 7–25)
CALCIUM SPEC-SCNC: 8.9 MG/DL (ref 8.6–10.5)
CHLORIDE SERPL-SCNC: 97 MMOL/L (ref 98–107)
CO2 SERPL-SCNC: 28.8 MMOL/L (ref 22–29)
CREAT BLD-MCNC: 0.4 MG/DL (ref 0.76–1.27)
DEPRECATED RDW RBC AUTO: 49 FL (ref 37–54)
EOSINOPHIL # BLD AUTO: 0.2 10*3/MM3 (ref 0–0.7)
EOSINOPHIL NFR BLD AUTO: 2.7 % (ref 0.3–6.2)
ERYTHROCYTE [DISTWIDTH] IN BLOOD BY AUTOMATED COUNT: 13.7 % (ref 11.5–14.5)
GFR SERPL CREATININE-BSD FRML MDRD: >150 ML/MIN/1.73
GLUCOSE BLD-MCNC: 113 MG/DL (ref 65–99)
GLUCOSE BLDC GLUCOMTR-MCNC: 103 MG/DL (ref 70–130)
GLUCOSE BLDC GLUCOMTR-MCNC: 112 MG/DL (ref 70–130)
GLUCOSE BLDC GLUCOMTR-MCNC: 114 MG/DL (ref 70–130)
GLUCOSE BLDC GLUCOMTR-MCNC: 120 MG/DL (ref 70–130)
GLUCOSE BLDC GLUCOMTR-MCNC: 131 MG/DL (ref 70–130)
HCT VFR BLD AUTO: 41.4 % (ref 40.4–52.2)
HGB BLD-MCNC: 13.1 G/DL (ref 13.7–17.6)
IMM GRANULOCYTES # BLD: 0.03 10*3/MM3 (ref 0–0.03)
IMM GRANULOCYTES NFR BLD: 0.4 % (ref 0–0.5)
LYMPHOCYTES # BLD AUTO: 1.23 10*3/MM3 (ref 0.9–4.8)
LYMPHOCYTES NFR BLD AUTO: 16.4 % (ref 19.6–45.3)
MAGNESIUM SERPL-MCNC: 2.3 MG/DL (ref 1.6–2.6)
MCH RBC QN AUTO: 31.2 PG (ref 27–32.7)
MCHC RBC AUTO-ENTMCNC: 31.6 G/DL (ref 32.6–36.4)
MCV RBC AUTO: 98.6 FL (ref 79.8–96.2)
MONOCYTES # BLD AUTO: 0.77 10*3/MM3 (ref 0.2–1.2)
MONOCYTES NFR BLD AUTO: 10.3 % (ref 5–12)
NEUTROPHILS # BLD AUTO: 5.24 10*3/MM3 (ref 1.9–8.1)
NEUTROPHILS NFR BLD AUTO: 69.8 % (ref 42.7–76)
PHOSPHATE SERPL-MCNC: 4.4 MG/DL (ref 2.5–4.5)
PLATELET # BLD AUTO: 222 10*3/MM3 (ref 140–500)
PMV BLD AUTO: 9.5 FL (ref 6–12)
POTASSIUM BLD-SCNC: 3.8 MMOL/L (ref 3.5–5.2)
RBC # BLD AUTO: 4.2 10*6/MM3 (ref 4.6–6)
SODIUM BLD-SCNC: 139 MMOL/L (ref 136–145)
WBC NRBC COR # BLD: 7.5 10*3/MM3 (ref 4.5–10.7)

## 2018-06-12 PROCEDURE — 85025 COMPLETE CBC W/AUTO DIFF WBC: CPT | Performed by: INTERNAL MEDICINE

## 2018-06-12 PROCEDURE — 94799 UNLISTED PULMONARY SVC/PX: CPT

## 2018-06-12 PROCEDURE — 80048 BASIC METABOLIC PNL TOTAL CA: CPT | Performed by: INTERNAL MEDICINE

## 2018-06-12 PROCEDURE — 83735 ASSAY OF MAGNESIUM: CPT | Performed by: INTERNAL MEDICINE

## 2018-06-12 PROCEDURE — 93010 ELECTROCARDIOGRAM REPORT: CPT | Performed by: INTERNAL MEDICINE

## 2018-06-12 PROCEDURE — 94003 VENT MGMT INPAT SUBQ DAY: CPT

## 2018-06-12 PROCEDURE — 93005 ELECTROCARDIOGRAM TRACING: CPT | Performed by: INTERNAL MEDICINE

## 2018-06-12 PROCEDURE — 82962 GLUCOSE BLOOD TEST: CPT

## 2018-06-12 PROCEDURE — 94640 AIRWAY INHALATION TREATMENT: CPT

## 2018-06-12 PROCEDURE — 84100 ASSAY OF PHOSPHORUS: CPT | Performed by: INTERNAL MEDICINE

## 2018-06-12 PROCEDURE — 94668 MNPJ CHEST WALL SBSQ: CPT

## 2018-06-12 PROCEDURE — 25010000002 ENOXAPARIN PER 10 MG: Performed by: INTERNAL MEDICINE

## 2018-06-12 RX ORDER — ALBUTEROL SULFATE 90 UG/1
6 AEROSOL, METERED RESPIRATORY (INHALATION)
Status: DISCONTINUED | OUTPATIENT
Start: 2018-06-12 | End: 2018-06-14

## 2018-06-12 RX ORDER — DIGOXIN 125 MCG
125 TABLET ORAL
Status: DISCONTINUED | OUTPATIENT
Start: 2018-06-13 | End: 2018-06-15 | Stop reason: HOSPADM

## 2018-06-12 RX ADMIN — BACLOFEN 5 MG: 10 TABLET ORAL at 21:34

## 2018-06-12 RX ADMIN — LEVETIRACETAM 750 MG: 100 SOLUTION ORAL at 18:17

## 2018-06-12 RX ADMIN — LEVETIRACETAM 750 MG: 100 SOLUTION ORAL at 06:29

## 2018-06-12 RX ADMIN — DILTIAZEM HYDROCHLORIDE 30 MG: 30 TABLET, FILM COATED ORAL at 16:33

## 2018-06-12 RX ADMIN — BACLOFEN 5 MG: 10 TABLET ORAL at 16:33

## 2018-06-12 RX ADMIN — ARIPIPRAZOLE 15 MG: 5 TABLET ORAL at 09:36

## 2018-06-12 RX ADMIN — TRAMADOL HYDROCHLORIDE 50 MG: 50 TABLET, FILM COATED ORAL at 09:51

## 2018-06-12 RX ADMIN — LEVOTHYROXINE SODIUM 50 MCG: 50 TABLET ORAL at 09:38

## 2018-06-12 RX ADMIN — CARBAMAZEPINE 100 MG: 100 SUSPENSION ORAL at 21:35

## 2018-06-12 RX ADMIN — CARBAMAZEPINE 100 MG: 100 SUSPENSION ORAL at 09:37

## 2018-06-12 RX ADMIN — RISPERIDONE 1 MG: 1 TABLET, FILM COATED ORAL at 21:34

## 2018-06-12 RX ADMIN — DILTIAZEM HYDROCHLORIDE 30 MG: 30 TABLET, FILM COATED ORAL at 09:38

## 2018-06-12 RX ADMIN — RISPERIDONE 1 MG: 1 TABLET, FILM COATED ORAL at 09:39

## 2018-06-12 RX ADMIN — BACLOFEN 5 MG: 10 TABLET ORAL at 09:37

## 2018-06-12 RX ADMIN — ALBUTEROL SULFATE 6 PUFF: 90 AEROSOL, METERED RESPIRATORY (INHALATION) at 19:51

## 2018-06-12 RX ADMIN — ALBUTEROL SULFATE 6 PUFF: 90 AEROSOL, METERED RESPIRATORY (INHALATION) at 12:52

## 2018-06-12 RX ADMIN — ENOXAPARIN SODIUM 40 MG: 100 INJECTION SUBCUTANEOUS at 06:29

## 2018-06-12 RX ADMIN — ASPIRIN 325 MG: 325 TABLET ORAL at 09:37

## 2018-06-12 NOTE — PLAN OF CARE
Problem: Patient Care Overview  Goal: Plan of Care Review  Outcome: Ongoing (interventions implemented as appropriate)   06/12/18 0637   Coping/Psychosocial   Plan of Care Reviewed With patient   OTHER   Outcome Summary Pt. tolerated spontaneous all evening. VSS. Increased restlessness noted. Prn pain medicine administered with some improvement. Heart rate flucutated from sinus lg to sinus tachycardia (120s). Multiple BMs during evening. Continued frequent suctioning, lung sounds improving. Plans for possible t-piece today. Continuing to monitor.    Plan of Care Review   Progress improving       Problem: Fall Risk (Adult)  Goal: Absence of Fall  Outcome: Ongoing (interventions implemented as appropriate)      Problem: Skin Injury Risk (Adult)  Goal: Skin Health and Integrity  Outcome: Ongoing (interventions implemented as appropriate)      Problem: Infection, Risk/Actual (Adult)  Goal: Infection Prevention/Resolution  Outcome: Ongoing (interventions implemented as appropriate)      Problem: Nutrition, Enteral (Adult)  Goal: Signs and Symptoms of Listed Potential Problems Will be Absent, Minimized or Managed (Nutrition, Enteral)  Outcome: Ongoing (interventions implemented as appropriate)      Problem: Breathing Pattern Ineffective (Adult)  Goal: Anxiety/Fear Reduction  Outcome: Ongoing (interventions implemented as appropriate)      Problem: Ventilation, Mechanical Invasive (Adult)  Goal: Signs and Symptoms of Listed Potential Problems Will be Absent, Minimized or Managed (Ventilation, Mechanical Invasive)  Outcome: Ongoing (interventions implemented as appropriate)      Problem: Pain, Acute (Adult)  Goal: Acceptable Pain Control/Comfort Level  Outcome: Ongoing (interventions implemented as appropriate)

## 2018-06-12 NOTE — PROGRESS NOTES
Dr. STEFANY Pandya    [facility] CORONARY CARE    6/12/2018    Patient ID:  Name:  Brayden Lynn  MRN:  [id number]  1960  57 y.o.  male            CC/Reason for visit: Follow-up for viral pneumonia, chronic respiratory failure, status post tracheostomy, traumatic brain injury       HPI: Patient more awake.  Not as restless.  Continues on spontaneous mode on the ventilator with tracheostomy.      Vitals:  Vitals:    06/12/18 0500 06/12/18 0612 06/12/18 0700 06/12/18 0737   BP:  124/73     Pulse: 62 64  79   Resp:       Temp:   98.1 °F (36.7 °C)    TempSrc:   Oral    SpO2: 96% 90%  98%   Weight:       Height:         FiO2 (%): 25 %     Body mass index is 23.77 kg/m².    Intake/Output Summary (Last 24 hours) at 06/12/18 0844  Last data filed at 06/12/18 0633   Gross per 24 hour   Intake             1490 ml   Output             2525 ml   Net            -1035 ml       Exam:  GEN:                awake, fidgety, doesn't follow commands.  EYES:              EOM-i, anicteric sclera bilat  ENT:                External ears/nose normal, OP clear  NECK:             Tracheostomy is clean, dry and intact  LUNGS:           Scattered rhonchi bilaterally nonlabored breathing  CV:                  Normal S1S2, without murmur, 1+ edema  ABD:                Non tender, no enlarged liver or masses  EXT:                No cyanosis or clubbing      Scheduled meds:    ARIPiprazole 15 mg Per G Tube Daily   aspirin 325 mg Per G Tube Daily   baclofen 5 mg Per G Tube TID   carBAMazepine 100 mg Per G Tube BID   diltiaZEM 30 mg Per G Tube TID   enoxaparin 40 mg Subcutaneous Q24H   insulin aspart 0-7 Units Subcutaneous Q6H   levETIRAcetam 750 mg Per G Tube Q12H   levothyroxine 50 mcg Per G Tube Daily   risperiDONE 1 mg Per G Tube Q12H                               Data Review:   I reviewed the patient's medications and new clinical results.  Lab Results   Component Value Date    CALCIUM 8.9 06/12/2018    PHOS 4.4 06/12/2018    MG  2.3 06/12/2018    MG 2.2 06/11/2018    MG 2.4 06/03/2018       Results from last 7 days  Lab Units 06/12/18  0456 06/11/18  0345 06/11/18  0344 06/10/18  0356   SODIUM mmol/L 139  --  139 141   POTASSIUM mmol/L 3.8  --  3.6 3.6   CHLORIDE mmol/L 97*  --  97* 99   CO2 mmol/L 28.8  --  30.7* 32.4*   BUN mg/dL 12  --  14 15   CREATININE mg/dL 0.40*  --  0.41* 0.49*   CALCIUM mg/dL 8.9  --  8.7 8.6   GLUCOSE mg/dL 113*  --  104* 140*   WBC 10*3/mm3 7.50 6.68  --  7.38   HEMOGLOBIN g/dL 13.1* 11.6*  --  11.8*   PLATELETS 10*3/mm3 222 227  --  191   INR   --   --   --  1.18*   PROBNP pg/mL  --   --  22.9 38.7   PROCALCITONIN ng/mL  --   --  0.04* 0.05*             Results from last 7 days  Lab Units 06/09/18  2121 06/09/18 2012 06/06/18  1201   PH, ARTERIAL pH units 7.428 7.450 7.383   PCO2, ARTERIAL mm Hg 59.0* 55.8* 57.6*   PO2 ART mm Hg 262.9* 49.9* 80.2   MODALITY  Adult Vent TTube TTube   O2 SATURATION CALC % 99.9* 85.5* 95.2         ASSESSMENT:   Ac on chronic hypoxic and hypercapnic respiratory failure  Parainfluenza bronchitis  LLL atelectasis/ ? pna  Metabolic encephalopathy   H/o TBI s/p trach - capped usually   Dysphagia s/p PEG   Chronic ruiz - replaced   Ileus improving      PLAN:  Try T piece today.  Try to wean him from mechanical ventilation.  Transfer out of CCU if needs fewer than every 4 hours suctioning of the airways.  Continue supportive care        German Pandya MD  6/12/2018

## 2018-06-13 ENCOUNTER — APPOINTMENT (OUTPATIENT)
Dept: GENERAL RADIOLOGY | Facility: HOSPITAL | Age: 58
End: 2018-06-13

## 2018-06-13 LAB
ANION GAP SERPL CALCULATED.3IONS-SCNC: 10.4 MMOL/L
ARTERIAL PATENCY WRIST A: POSITIVE
ATMOSPHERIC PRESS: 748.6 MMHG
BASE EXCESS BLDA CALC-SCNC: 5.1 MMOL/L (ref 0–2)
BASOPHILS # BLD AUTO: 0.01 10*3/MM3 (ref 0–0.2)
BASOPHILS NFR BLD AUTO: 0.1 % (ref 0–1.5)
BDY SITE: ABNORMAL
BUN BLD-MCNC: 15 MG/DL (ref 6–20)
BUN/CREAT SERPL: 34.1 (ref 7–25)
CALCIUM SPEC-SCNC: 8.9 MG/DL (ref 8.6–10.5)
CHLORIDE SERPL-SCNC: 98 MMOL/L (ref 98–107)
CO2 SERPL-SCNC: 29.6 MMOL/L (ref 22–29)
CREAT BLD-MCNC: 0.44 MG/DL (ref 0.76–1.27)
DEPRECATED RDW RBC AUTO: 50.8 FL (ref 37–54)
EOSINOPHIL # BLD AUTO: 0.18 10*3/MM3 (ref 0–0.7)
EOSINOPHIL NFR BLD AUTO: 2.3 % (ref 0.3–6.2)
ERYTHROCYTE [DISTWIDTH] IN BLOOD BY AUTOMATED COUNT: 14 % (ref 11.5–14.5)
GFR SERPL CREATININE-BSD FRML MDRD: >150 ML/MIN/1.73
GLUCOSE BLD-MCNC: 101 MG/DL (ref 65–99)
GLUCOSE BLDC GLUCOMTR-MCNC: 107 MG/DL (ref 70–130)
GLUCOSE BLDC GLUCOMTR-MCNC: 117 MG/DL (ref 70–130)
GLUCOSE BLDC GLUCOMTR-MCNC: 123 MG/DL (ref 70–130)
GLUCOSE BLDC GLUCOMTR-MCNC: 132 MG/DL (ref 70–130)
HCO3 BLDA-SCNC: 30.2 MMOL/L (ref 22–28)
HCT VFR BLD AUTO: 40.3 % (ref 40.4–52.2)
HGB BLD-MCNC: 12.8 G/DL (ref 13.7–17.6)
HOROWITZ INDEX BLD+IHG-RTO: 60 %
IMM GRANULOCYTES # BLD: 0.02 10*3/MM3 (ref 0–0.03)
IMM GRANULOCYTES NFR BLD: 0.3 % (ref 0–0.5)
LYMPHOCYTES # BLD AUTO: 1.55 10*3/MM3 (ref 0.9–4.8)
LYMPHOCYTES NFR BLD AUTO: 19.7 % (ref 19.6–45.3)
MCH RBC QN AUTO: 31.7 PG (ref 27–32.7)
MCHC RBC AUTO-ENTMCNC: 31.8 G/DL (ref 32.6–36.4)
MCV RBC AUTO: 99.8 FL (ref 79.8–96.2)
MODALITY: ABNORMAL
MONOCYTES # BLD AUTO: 0.39 10*3/MM3 (ref 0.2–1.2)
MONOCYTES NFR BLD AUTO: 4.9 % (ref 5–12)
NEUTROPHILS # BLD AUTO: 5.73 10*3/MM3 (ref 1.9–8.1)
NEUTROPHILS NFR BLD AUTO: 72.7 % (ref 42.7–76)
O2 A-A PPRESDIFF RESPIRATORY: 0.2 MMHG
PCO2 BLDA: 45.5 MM HG (ref 35–45)
PEEP RESPIRATORY: 5 CM[H2O]
PH BLDA: 7.43 PH UNITS (ref 7.35–7.45)
PLATELET # BLD AUTO: 210 10*3/MM3 (ref 140–500)
PMV BLD AUTO: 9.6 FL (ref 6–12)
PO2 BLDA: 60 MM HG (ref 80–100)
POTASSIUM BLD-SCNC: 3.7 MMOL/L (ref 3.5–5.2)
PROCALCITONIN SERPL-MCNC: 0.04 NG/ML (ref 0.1–0.25)
RBC # BLD AUTO: 4.04 10*6/MM3 (ref 4.6–6)
SAO2 % BLDCOA: 91 % (ref 92–99)
SET MECH RESP RATE: 16
SODIUM BLD-SCNC: 138 MMOL/L (ref 136–145)
TOTAL RATE: 16 BREATHS/MINUTE
VENTILATOR MODE: AC
VT ON VENT VENT: 476 ML
WBC NRBC COR # BLD: 7.88 10*3/MM3 (ref 4.5–10.7)

## 2018-06-13 PROCEDURE — 84145 PROCALCITONIN (PCT): CPT | Performed by: INTERNAL MEDICINE

## 2018-06-13 PROCEDURE — 85025 COMPLETE CBC W/AUTO DIFF WBC: CPT | Performed by: INTERNAL MEDICINE

## 2018-06-13 PROCEDURE — 82803 BLOOD GASES ANY COMBINATION: CPT

## 2018-06-13 PROCEDURE — 94799 UNLISTED PULMONARY SVC/PX: CPT

## 2018-06-13 PROCEDURE — 71045 X-RAY EXAM CHEST 1 VIEW: CPT

## 2018-06-13 PROCEDURE — 82962 GLUCOSE BLOOD TEST: CPT

## 2018-06-13 PROCEDURE — 87186 SC STD MICRODIL/AGAR DIL: CPT | Performed by: INTERNAL MEDICINE

## 2018-06-13 PROCEDURE — 94668 MNPJ CHEST WALL SBSQ: CPT

## 2018-06-13 PROCEDURE — 87070 CULTURE OTHR SPECIMN AEROBIC: CPT | Performed by: INTERNAL MEDICINE

## 2018-06-13 PROCEDURE — 25010000002 ENOXAPARIN PER 10 MG: Performed by: INTERNAL MEDICINE

## 2018-06-13 PROCEDURE — 36600 WITHDRAWAL OF ARTERIAL BLOOD: CPT

## 2018-06-13 PROCEDURE — 80048 BASIC METABOLIC PNL TOTAL CA: CPT | Performed by: INTERNAL MEDICINE

## 2018-06-13 PROCEDURE — 94660 CPAP INITIATION&MGMT: CPT

## 2018-06-13 PROCEDURE — 87205 SMEAR GRAM STAIN: CPT | Performed by: INTERNAL MEDICINE

## 2018-06-13 PROCEDURE — 94003 VENT MGMT INPAT SUBQ DAY: CPT

## 2018-06-13 RX ORDER — BACLOFEN 10 MG/1
5 TABLET ORAL EVERY 12 HOURS SCHEDULED
Status: DISCONTINUED | OUTPATIENT
Start: 2018-06-13 | End: 2018-06-15 | Stop reason: HOSPADM

## 2018-06-13 RX ADMIN — ARIPIPRAZOLE 15 MG: 5 TABLET ORAL at 09:24

## 2018-06-13 RX ADMIN — LEVETIRACETAM 750 MG: 100 SOLUTION ORAL at 17:02

## 2018-06-13 RX ADMIN — DIGOXIN 125 MCG: 0.12 TABLET ORAL at 12:59

## 2018-06-13 RX ADMIN — ALBUTEROL SULFATE 6 PUFF: 90 AEROSOL, METERED RESPIRATORY (INHALATION) at 07:40

## 2018-06-13 RX ADMIN — ASPIRIN 325 MG: 325 TABLET ORAL at 09:21

## 2018-06-13 RX ADMIN — RISPERIDONE 1 MG: 1 TABLET, FILM COATED ORAL at 09:21

## 2018-06-13 RX ADMIN — BACLOFEN 5 MG: 10 TABLET ORAL at 09:19

## 2018-06-13 RX ADMIN — CARBAMAZEPINE 100 MG: 100 SUSPENSION ORAL at 09:21

## 2018-06-13 RX ADMIN — ALBUTEROL SULFATE 6 PUFF: 90 AEROSOL, METERED RESPIRATORY (INHALATION) at 00:08

## 2018-06-13 RX ADMIN — RISPERIDONE 1 MG: 1 TABLET, FILM COATED ORAL at 20:58

## 2018-06-13 RX ADMIN — ENOXAPARIN SODIUM 40 MG: 100 INJECTION SUBCUTANEOUS at 06:21

## 2018-06-13 RX ADMIN — BACLOFEN 5 MG: 10 TABLET ORAL at 20:58

## 2018-06-13 RX ADMIN — ALBUTEROL SULFATE 6 PUFF: 90 AEROSOL, METERED RESPIRATORY (INHALATION) at 23:00

## 2018-06-13 RX ADMIN — CARBAMAZEPINE 100 MG: 100 SUSPENSION ORAL at 20:58

## 2018-06-13 RX ADMIN — LEVETIRACETAM 750 MG: 100 SOLUTION ORAL at 06:21

## 2018-06-13 RX ADMIN — LEVOTHYROXINE SODIUM 50 MCG: 50 TABLET ORAL at 09:21

## 2018-06-13 RX ADMIN — ALBUTEROL SULFATE 6 PUFF: 90 AEROSOL, METERED RESPIRATORY (INHALATION) at 10:58

## 2018-06-13 RX ADMIN — ALBUTEROL SULFATE 6 PUFF: 90 AEROSOL, METERED RESPIRATORY (INHALATION) at 19:26

## 2018-06-13 NOTE — PROGRESS NOTES
Dr. STEFANY Pandya    [facility] CORONARY CARE    6/13/2018    Patient ID:  Name:  Brayden Lynn  MRN:  [id number]  1960  57 y.o.  male            CC/Reason for visit: Follow-up for viral pneumonia, chronic respiratory failure, status post tracheostomy, traumatic brain injury       HPI: Patient developed sudden respiratory distress last night on the ventilator via tracheostomy.  Required sudden increase in FiO2 to 100%, to maintain oxygen saturation above 90%.  This was acute,  Stat chest x-ray was directly visualized by me.  It does not show much change.  Perhaps some mild left lower lobe atelectasis.  Patient required suctioning of respiratory secretions which has partially helped.    Mechanical ventilator settings were reviewed and adjusted by me today.    Vitals:  Vitals:    06/13/18 0713 06/13/18 0730 06/13/18 0740 06/13/18 0806   BP:    93/61   Pulse:  50 50 52   Resp:   16    Temp: 97.4 °F (36.3 °C)      TempSrc: Oral      SpO2:  100% 100%    Weight:       Height:         FiO2 (%): 50 %     Body mass index is 23.77 kg/m².    Intake/Output Summary (Last 24 hours) at 06/13/18 0955  Last data filed at 06/13/18 0626   Gross per 24 hour   Intake             1895 ml   Output              910 ml   Net              985 ml       Exam:  GEN:                awake, fidgety, doesn't follow commands.  EYES:               anicteric sclera bilat  Musculoskeletal: The patient has significant atrophy and some mild deformity and contracture of upper extremities bilaterally  NECK:             Tracheostomy is clean, dry and intact  LUNGS:           Scattered rhonchi bilaterally nonlabored breathing  CV:                  Normal S1S2, without murmur, 1+ edema  ABD:                Non tender, no enlarged liver or masses      Scheduled meds:    albuterol 6 puff Inhalation Q6H - RT   ARIPiprazole 15 mg Per G Tube Daily   aspirin 325 mg Per G Tube Daily   baclofen 5 mg Per G Tube TID   carBAMazepine 100 mg Per G Tube  BID   digoxin 125 mcg Oral Daily   diltiaZEM 30 mg Per G Tube TID   enoxaparin 40 mg Subcutaneous Q24H   insulin aspart 0-7 Units Subcutaneous Q6H   levETIRAcetam 750 mg Per G Tube Q12H   levothyroxine 50 mcg Per G Tube Daily   risperiDONE 1 mg Per G Tube Q12H     IV meds:                           Data Review:   I reviewed the patient's medications and new clinical results.  Lab Results   Component Value Date    CALCIUM 8.9 06/13/2018    PHOS 4.4 06/12/2018    MG 2.3 06/12/2018    MG 2.2 06/11/2018    MG 2.4 06/03/2018       Results from last 7 days  Lab Units 06/13/18  0514 06/12/18  0456 06/11/18  0345 06/11/18  0344 06/10/18  0356   SODIUM mmol/L 138 139  --  139 141   POTASSIUM mmol/L 3.7 3.8  --  3.6 3.6   CHLORIDE mmol/L 98 97*  --  97* 99   CO2 mmol/L 29.6* 28.8  --  30.7* 32.4*   BUN mg/dL 15 12  --  14 15   CREATININE mg/dL 0.44* 0.40*  --  0.41* 0.49*   CALCIUM mg/dL 8.9 8.9  --  8.7 8.6   GLUCOSE mg/dL 101* 113*  --  104* 140*   WBC 10*3/mm3 7.88 7.50 6.68  --  7.38   HEMOGLOBIN g/dL 12.8* 13.1* 11.6*  --  11.8*   PLATELETS 10*3/mm3 210 222 227  --  191   INR   --   --   --   --  1.18*   PROBNP pg/mL  --   --   --  22.9 38.7   PROCALCITONIN ng/mL 0.04*  --   --  0.04* 0.05*       Results from last 7 days  Lab Units 06/13/18  0140   GRAM STAIN RESULT  Rare (1+) WBCs seen  Rare (1+) Epithelial cells per low power field  Mixed bacterial morphotypes seen on Gram Stain                 Results from last 7 days  Lab Units 06/13/18  0037 06/09/18  2121 06/09/18 2012 06/06/18  1201   PH, ARTERIAL pH units 7.430 7.428 7.450 7.383   PCO2, ARTERIAL mm Hg 45.5* 59.0* 55.8* 57.6*   PO2 ART mm Hg 60.0* 262.9* 49.9* 80.2   MODALITY  Adult Vent Adult Vent TTube TTube   O2 SATURATION CALC % 91.0* 99.9* 85.5* 95.2     Imaging: Please see above for my description of direct visualization of images.      ASSESSMENT:   Sudden worsening of acute respiratory failure, requiring increase in FiO2 overnight  Abundant respiratory  secretions  Parainfluenza bronchitis  LLL atelectasis/ ? pna  Metabolic encephalopathy   H/o TBI s/p trach - capped usually   Dysphagia s/p PEG   Chronic ruiz - replaced   Ileus improving      PLAN:  Patient's respiratory status worsened suddenly last night around midnight.  Chest x-ray was obtained and directly visualized by me.  No clear etiology.  Presumption is acute mucous plugging from abundant respiratory secretions.  Additional suctioning of airways was performed and now patient is stabilized.  Repeat ABG, chest x-ray and spontaneous breathing trial in the morning.    His other medical problems are chronic and stable.  Continue current supportive care        German Pandya MD  6/13/2018

## 2018-06-13 NOTE — CONSULTS
Adult Nutrition  Assessment/PES    Patient Name:  Brayden Lynn  YOB: 1960  MRN: [id number]  Admit Date:  5/29/2018    Assessment Date:  6/13/2018    Comments:  Pt tolerating Jevity 1.2 at 30 ml/hr per family request.  Water flush 50ml/hr. Discussed TF's with RN and family.          Reason for Assessment     Row Name 06/13/18 1014          Reason for Assessment    Reason For Assessment follow-up protocol             Nutrition/Diet History     Row Name 06/13/18 1014          Nutrition/Diet History    Factors Affecting Nutritional Intake --   family does not want high TF rate wants to keep rate at 30cc/hr               Labs/Tests/Procedures/Meds     Row Name 06/13/18 1015          Labs/Procedures/Meds    Lab Results Reviewed reviewed     Lab Results Comments hgb/hct        Diagnostic Tests/Procedures    Diagnostic Test/Procedure Reviewed reviewed        Medications    Pertinent Medications Reviewed reviewed             Physical Findings     Row Name 06/13/18 1015          Physical Findings    Overall Physical Appearance --   trach     Gastrointestinal feeding tube     Tubes PEG     Skin --   excoritated               Nutrition Prescription Ordered     Row Name 06/13/18 1017          Nutrition Prescription PO    Current PO Diet NPO        Nutrition Prescription EN    Product Jevity 1.2 maggie     TF Delivery Method Continuous     Continuous TF Goal Rate (mL/hr) 50 mL/hr     Continuous TF Current Rate (mL/hr) 30 mL/hr   per family request     Water flush (mL)  50 mL     Water Flush Frequency Per hour             Evaluation of Received Nutrient/Fluid Intake     Row Name 06/13/18 1020          Calories Evaluation    Enteral Calories (kcal) 864     % of Kcal Needs 70        Protein Evaluation    Enteral Protein (gm) 39     % of Protein Needs 61        Intake Assessment    Energy/Calorie Requirement Assessment meeting needs     Protein Requirement Assessment meeting needs     Fluid Requirement Assessment  meeting needs     Tolerance tolerating        Fluid Intake Evaluation    Enteral (Free Water) Fluid (mL) 579     Free Water Flush Fluid (mL) 1200     Total Free Water Intake (mL) 1779 mL        EN Evaluation    TF Tolerance --   BMS     HOB Greater than or equal to 30 degress             Problem/Interventions:                  Intervention Goal     Row Name 06/13/18 1022          Intervention Goal    General Maintain nutrition;Nutrition support treatment     TF/PN Tolerate TF at goal     Weight No significant weight loss             Nutrition Intervention     Row Name 06/13/18 1022          Nutrition Intervention    RD/Tech Action Follow Tx progress;Care plan reviewd             Nutrition Prescription     Row Name 06/13/18 1022          Nutrition Prescription EN    Enteral Prescription Continue same protocol             Education/Evaluation     Row Name 06/13/18 1022          Monitor/Evaluation    Monitor Per protocol         Electronically signed by:  Juliette Moreno RD  06/13/18 10:23 AM

## 2018-06-13 NOTE — PLAN OF CARE
Problem: Patient Care Overview  Goal: Plan of Care Review  Outcome: Ongoing (interventions implemented as appropriate)   06/13/18 0614   Coping/Psychosocial   Plan of Care Reviewed With patient   OTHER   Outcome Summary Pt. with increased secretions during the night, requiring suctioning approximately every 1.5-2 hours. At 0000 pt. noted to desat to 80s, suctioning attempted, oxygen increased with no improvement. MD notified and chest x-ray obtained. Results of x-ray and abg reported and new sputum culture obtained. Awaiting morning labs. Continuing to monitor.    Plan of Care Review   Progress declining       Problem: Fall Risk (Adult)  Goal: Absence of Fall  Outcome: Ongoing (interventions implemented as appropriate)      Problem: Skin Injury Risk (Adult)  Goal: Skin Health and Integrity  Outcome: Ongoing (interventions implemented as appropriate)      Problem: Infection, Risk/Actual (Adult)  Goal: Infection Prevention/Resolution  Outcome: Ongoing (interventions implemented as appropriate)      Problem: Nutrition, Enteral (Adult)  Goal: Signs and Symptoms of Listed Potential Problems Will be Absent, Minimized or Managed (Nutrition, Enteral)  Outcome: Ongoing (interventions implemented as appropriate)      Problem: Breathing Pattern Ineffective (Adult)  Goal: Effective Oxygenation/Ventilation  Outcome: Ongoing (interventions implemented as appropriate)      Problem: Ventilation, Mechanical Invasive (Adult)  Goal: Signs and Symptoms of Listed Potential Problems Will be Absent, Minimized or Managed (Ventilation, Mechanical Invasive)  Outcome: Ongoing (interventions implemented as appropriate)      Problem: Pain, Acute (Adult)  Goal: Acceptable Pain Control/Comfort Level  Outcome: Ongoing (interventions implemented as appropriate)

## 2018-06-13 NOTE — PLAN OF CARE
Problem: Patient Care Overview  Goal: Plan of Care Review  Outcome: Ongoing (interventions implemented as appropriate)   06/13/18 1650   Coping/Psychosocial   Plan of Care Reviewed With parent   OTHER   Outcome Summary Pt. tolerating being on pressure support. On 40% Fi02. Vitals stable. Parents updated on plan of care.. Questions answered. Reassurance offered. Will continue to monitor closely.       Problem: Fall Risk (Adult)  Goal: Absence of Fall  Outcome: Ongoing (interventions implemented as appropriate)      Problem: Skin Injury Risk (Adult)  Goal: Skin Health and Integrity  Outcome: Ongoing (interventions implemented as appropriate)      Problem: Infection, Risk/Actual (Adult)  Goal: Infection Prevention/Resolution  Outcome: Ongoing (interventions implemented as appropriate)      Problem: Nutrition, Enteral (Adult)  Goal: Signs and Symptoms of Listed Potential Problems Will be Absent, Minimized or Managed (Nutrition, Enteral)  Outcome: Ongoing (interventions implemented as appropriate)      Problem: Breathing Pattern Ineffective (Adult)  Goal: Effective Oxygenation/Ventilation  Outcome: Ongoing (interventions implemented as appropriate)    Goal: Anxiety/Fear Reduction  Outcome: Ongoing (interventions implemented as appropriate)      Problem: Ventilation, Mechanical Invasive (Adult)  Goal: Signs and Symptoms of Listed Potential Problems Will be Absent, Minimized or Managed (Ventilation, Mechanical Invasive)  Outcome: Ongoing (interventions implemented as appropriate)      Problem: Pain, Acute (Adult)  Goal: Acceptable Pain Control/Comfort Level  Outcome: Ongoing (interventions implemented as appropriate)

## 2018-06-14 LAB
ANION GAP SERPL CALCULATED.3IONS-SCNC: 8.8 MMOL/L
ARTERIAL PATENCY WRIST A: ABNORMAL
ATMOSPHERIC PRESS: 751.1 MMHG
BASE EXCESS BLDA CALC-SCNC: 7.9 MMOL/L (ref 0–2)
BASOPHILS # BLD AUTO: 0.02 10*3/MM3 (ref 0–0.2)
BASOPHILS NFR BLD AUTO: 0.3 % (ref 0–1.5)
BDY SITE: ABNORMAL
BUN BLD-MCNC: 10 MG/DL (ref 6–20)
BUN/CREAT SERPL: 23.8 (ref 7–25)
CALCIUM SPEC-SCNC: 8.9 MG/DL (ref 8.6–10.5)
CHLORIDE SERPL-SCNC: 101 MMOL/L (ref 98–107)
CO2 SERPL-SCNC: 31.2 MMOL/L (ref 22–29)
CREAT BLD-MCNC: 0.42 MG/DL (ref 0.76–1.27)
DEPRECATED RDW RBC AUTO: 49.6 FL (ref 37–54)
DIGOXIN SERPL-MCNC: <0.3 NG/ML (ref 0.6–1.2)
EOSINOPHIL # BLD AUTO: 0.14 10*3/MM3 (ref 0–0.7)
EOSINOPHIL NFR BLD AUTO: 1.8 % (ref 0.3–6.2)
ERYTHROCYTE [DISTWIDTH] IN BLOOD BY AUTOMATED COUNT: 13.7 % (ref 11.5–14.5)
GFR SERPL CREATININE-BSD FRML MDRD: >150 ML/MIN/1.73
GLUCOSE BLD-MCNC: 115 MG/DL (ref 65–99)
GLUCOSE BLDC GLUCOMTR-MCNC: 100 MG/DL (ref 70–130)
GLUCOSE BLDC GLUCOMTR-MCNC: 104 MG/DL (ref 70–130)
GLUCOSE BLDC GLUCOMTR-MCNC: 112 MG/DL (ref 70–130)
HCO3 BLDA-SCNC: 34.8 MMOL/L (ref 22–28)
HCT VFR BLD AUTO: 39 % (ref 40.4–52.2)
HGB BLD-MCNC: 12.1 G/DL (ref 13.7–17.6)
HOROWITZ INDEX BLD+IHG-RTO: 40 %
IMM GRANULOCYTES # BLD: 0.03 10*3/MM3 (ref 0–0.03)
IMM GRANULOCYTES NFR BLD: 0.4 % (ref 0–0.5)
LYMPHOCYTES # BLD AUTO: 1.42 10*3/MM3 (ref 0.9–4.8)
LYMPHOCYTES NFR BLD AUTO: 18.6 % (ref 19.6–45.3)
MCH RBC QN AUTO: 30.8 PG (ref 27–32.7)
MCHC RBC AUTO-ENTMCNC: 31 G/DL (ref 32.6–36.4)
MCV RBC AUTO: 99.2 FL (ref 79.8–96.2)
MODALITY: ABNORMAL
MONOCYTES # BLD AUTO: 0.6 10*3/MM3 (ref 0.2–1.2)
MONOCYTES NFR BLD AUTO: 7.8 % (ref 5–12)
NEUTROPHILS # BLD AUTO: 5.44 10*3/MM3 (ref 1.9–8.1)
NEUTROPHILS NFR BLD AUTO: 71.1 % (ref 42.7–76)
NT-PROBNP SERPL-MCNC: 28.4 PG/ML (ref 5–900)
O2 A-A PPRESDIFF RESPIRATORY: 0.3 MMHG
PCO2 BLDA: 55.5 MM HG (ref 35–45)
PH BLDA: 7.41 PH UNITS (ref 7.35–7.45)
PLATELET # BLD AUTO: 204 10*3/MM3 (ref 140–500)
PMV BLD AUTO: 9.5 FL (ref 6–12)
PO2 BLDA: 65.3 MM HG (ref 80–100)
POTASSIUM BLD-SCNC: 3.8 MMOL/L (ref 3.5–5.2)
RBC # BLD AUTO: 3.93 10*6/MM3 (ref 4.6–6)
SAO2 % BLDCOA: 92 % (ref 92–99)
SODIUM BLD-SCNC: 141 MMOL/L (ref 136–145)
TOTAL RATE: 20 BREATHS/MINUTE
WBC NRBC COR # BLD: 7.65 10*3/MM3 (ref 4.5–10.7)

## 2018-06-14 PROCEDURE — 80162 ASSAY OF DIGOXIN TOTAL: CPT | Performed by: INTERNAL MEDICINE

## 2018-06-14 PROCEDURE — 82962 GLUCOSE BLOOD TEST: CPT

## 2018-06-14 PROCEDURE — 94799 UNLISTED PULMONARY SVC/PX: CPT

## 2018-06-14 PROCEDURE — 83880 ASSAY OF NATRIURETIC PEPTIDE: CPT | Performed by: INTERNAL MEDICINE

## 2018-06-14 PROCEDURE — 80048 BASIC METABOLIC PNL TOTAL CA: CPT | Performed by: INTERNAL MEDICINE

## 2018-06-14 PROCEDURE — 93005 ELECTROCARDIOGRAM TRACING: CPT | Performed by: INTERNAL MEDICINE

## 2018-06-14 PROCEDURE — 82803 BLOOD GASES ANY COMBINATION: CPT

## 2018-06-14 PROCEDURE — 94668 MNPJ CHEST WALL SBSQ: CPT

## 2018-06-14 PROCEDURE — 85025 COMPLETE CBC W/AUTO DIFF WBC: CPT | Performed by: INTERNAL MEDICINE

## 2018-06-14 PROCEDURE — 36600 WITHDRAWAL OF ARTERIAL BLOOD: CPT

## 2018-06-14 PROCEDURE — 94003 VENT MGMT INPAT SUBQ DAY: CPT

## 2018-06-14 PROCEDURE — 25010000002 ENOXAPARIN PER 10 MG: Performed by: INTERNAL MEDICINE

## 2018-06-14 PROCEDURE — 93010 ELECTROCARDIOGRAM REPORT: CPT | Performed by: INTERNAL MEDICINE

## 2018-06-14 RX ORDER — SODIUM CHLORIDE FOR INHALATION 7 %
4 VIAL, NEBULIZER (ML) INHALATION
Status: DISCONTINUED | OUTPATIENT
Start: 2018-06-14 | End: 2018-06-15 | Stop reason: HOSPADM

## 2018-06-14 RX ORDER — IPRATROPIUM BROMIDE AND ALBUTEROL SULFATE 2.5; .5 MG/3ML; MG/3ML
3 SOLUTION RESPIRATORY (INHALATION)
Status: DISCONTINUED | OUTPATIENT
Start: 2018-06-14 | End: 2018-06-15 | Stop reason: HOSPADM

## 2018-06-14 RX ADMIN — LEVETIRACETAM 750 MG: 100 SOLUTION ORAL at 06:11

## 2018-06-14 RX ADMIN — IPRATROPIUM BROMIDE AND ALBUTEROL SULFATE 3 ML: .5; 3 SOLUTION RESPIRATORY (INHALATION) at 15:38

## 2018-06-14 RX ADMIN — IPRATROPIUM BROMIDE AND ALBUTEROL SULFATE 3 ML: .5; 3 SOLUTION RESPIRATORY (INHALATION) at 19:34

## 2018-06-14 RX ADMIN — ARIPIPRAZOLE 15 MG: 5 TABLET ORAL at 08:12

## 2018-06-14 RX ADMIN — BACLOFEN 5 MG: 10 TABLET ORAL at 08:12

## 2018-06-14 RX ADMIN — ENOXAPARIN SODIUM 40 MG: 100 INJECTION SUBCUTANEOUS at 06:11

## 2018-06-14 RX ADMIN — LEVOTHYROXINE SODIUM 50 MCG: 50 TABLET ORAL at 08:12

## 2018-06-14 RX ADMIN — LEVETIRACETAM 750 MG: 100 SOLUTION ORAL at 17:57

## 2018-06-14 RX ADMIN — CARBAMAZEPINE 100 MG: 100 SUSPENSION ORAL at 21:10

## 2018-06-14 RX ADMIN — CARBAMAZEPINE 100 MG: 100 SUSPENSION ORAL at 08:12

## 2018-06-14 RX ADMIN — RISPERIDONE 1 MG: 1 TABLET, FILM COATED ORAL at 08:12

## 2018-06-14 RX ADMIN — BACLOFEN 5 MG: 10 TABLET ORAL at 21:10

## 2018-06-14 RX ADMIN — TRAMADOL HYDROCHLORIDE 50 MG: 50 TABLET, FILM COATED ORAL at 21:49

## 2018-06-14 RX ADMIN — ALBUTEROL SULFATE 6 PUFF: 90 AEROSOL, METERED RESPIRATORY (INHALATION) at 08:16

## 2018-06-14 RX ADMIN — RISPERIDONE 1 MG: 1 TABLET, FILM COATED ORAL at 21:10

## 2018-06-14 RX ADMIN — IPRATROPIUM BROMIDE AND ALBUTEROL SULFATE 3 ML: .5; 3 SOLUTION RESPIRATORY (INHALATION) at 12:51

## 2018-06-14 RX ADMIN — ASPIRIN 325 MG: 325 TABLET ORAL at 08:13

## 2018-06-14 NOTE — PLAN OF CARE
Problem: Patient Care Overview  Goal: Plan of Care Review  Outcome: Ongoing (interventions implemented as appropriate)   06/14/18 0526   Coping/Psychosocial   Plan of Care Reviewed With patient   OTHER   Outcome Summary Pt. tolerating spontaneous during night. Still requiring frequent suctioning for secretions. VSS. Continuing to monitor.    Plan of Care Review   Progress no change       Problem: Fall Risk (Adult)  Goal: Absence of Fall  Outcome: Ongoing (interventions implemented as appropriate)      Problem: Skin Injury Risk (Adult)  Goal: Skin Health and Integrity  Outcome: Ongoing (interventions implemented as appropriate)      Problem: Infection, Risk/Actual (Adult)  Goal: Infection Prevention/Resolution  Outcome: Ongoing (interventions implemented as appropriate)      Problem: Nutrition, Enteral (Adult)  Goal: Signs and Symptoms of Listed Potential Problems Will be Absent, Minimized or Managed (Nutrition, Enteral)  Outcome: Ongoing (interventions implemented as appropriate)      Problem: Breathing Pattern Ineffective (Adult)  Goal: Effective Oxygenation/Ventilation  Outcome: Ongoing (interventions implemented as appropriate)    Goal: Anxiety/Fear Reduction  Outcome: Ongoing (interventions implemented as appropriate)      Problem: Ventilation, Mechanical Invasive (Adult)  Goal: Signs and Symptoms of Listed Potential Problems Will be Absent, Minimized or Managed (Ventilation, Mechanical Invasive)  Outcome: Ongoing (interventions implemented as appropriate)      Problem: Pain, Acute (Adult)  Goal: Acceptable Pain Control/Comfort Level  Outcome: Ongoing (interventions implemented as appropriate)

## 2018-06-14 NOTE — NURSING NOTE
Dr. Pandya notified of positive sputum culture result. Per [facility], patient has completed 7 days of Rocephin. Orders received for CBC w/ Diff and Procalcitonin level in AM. Will continue to monitor.

## 2018-06-14 NOTE — PROGRESS NOTES
Dr. STEFANY Pandya    [facility] CORONARY CARE    6/14/2018    Patient ID:  Name:  Brayden Lynn  MRN:  [id number]  1960  57 y.o.  male            CC/Reason for visit: Follow-up for viral pneumonia, chronic respiratory failure, status post tracheostomy, traumatic brain injury       HPI: Patient is better today.  Still frequent suctioning of secretions, but tolerated spontaneous ventilator mode all night long.    Vitals:  Vitals:    06/14/18 0802 06/14/18 0817 06/14/18 1003 06/14/18 1032   BP: 127/86  111/68    Pulse: 59 74 53    Resp:  18  14   Temp:       TempSrc:       SpO2: 99% 99% 94%    Weight:       Height:         FiO2 (%): 40 %     Body mass index is 23.82 kg/m².    Intake/Output Summary (Last 24 hours) at 06/14/18 1041  Last data filed at 06/14/18 0751   Gross per 24 hour   Intake             2116 ml   Output             2075 ml   Net               41 ml       Exam:  GEN:                awake, doesn't follow commands.  EYES:               anicteric sclera bilat  Musculoskeletal: The patient has significant atrophy and some mild deformity and contracture of upper extremities bilaterally  NECK:             Tracheostomy is clean, dry and intact  LUNGS:           Scattered rhonchi bilaterally nonlabored breathing  CV:                  Normal S1S2, without murmur, 1+ edema  ABD:                Non tender, no enlarged liver or masses      Scheduled meds:    ARIPiprazole 15 mg Per G Tube Daily   aspirin 325 mg Per G Tube Daily   baclofen 5 mg Per G Tube Q12H   carBAMazepine 100 mg Per G Tube BID   digoxin 125 mcg Oral Daily   enoxaparin 40 mg Subcutaneous Q24H   insulin aspart 0-7 Units Subcutaneous Q6H   ipratropium-albuterol 3 mL Nebulization 4x Daily - RT   levETIRAcetam 750 mg Per G Tube Q12H   levothyroxine 50 mcg Per G Tube Daily   risperiDONE 1 mg Per G Tube Q12H   sodium chloride 4 mL Nebulization TID - RT     IV meds:                           Data Review:   I reviewed the patient's  medications and new clinical results.  Lab Results   Component Value Date    CALCIUM 8.9 06/14/2018    PHOS 4.4 06/12/2018    MG 2.3 06/12/2018    MG 2.2 06/11/2018    MG 2.4 06/03/2018       Results from last 7 days  Lab Units 06/14/18  0444 06/13/18  0514 06/12/18  0456  06/11/18  0344 06/10/18  0356   SODIUM mmol/L 141 138 139  --  139 141   POTASSIUM mmol/L 3.8 3.7 3.8  --  3.6 3.6   CHLORIDE mmol/L 101 98 97*  --  97* 99   CO2 mmol/L 31.2* 29.6* 28.8  --  30.7* 32.4*   BUN mg/dL 10 15 12  --  14 15   CREATININE mg/dL 0.42* 0.44* 0.40*  --  0.41* 0.49*   CALCIUM mg/dL 8.9 8.9 8.9  --  8.7 8.6   GLUCOSE mg/dL 115* 101* 113*  --  104* 140*   WBC 10*3/mm3 7.65 7.88 7.50  < >  --  7.38   HEMOGLOBIN g/dL 12.1* 12.8* 13.1*  < >  --  11.8*   PLATELETS 10*3/mm3 204 210 222  < >  --  191   INR   --   --   --   --   --  1.18*   PROBNP pg/mL 28.4  --   --   --  22.9 38.7   PROCALCITONIN ng/mL  --  0.04*  --   --  0.04* 0.05*   < > = values in this interval not displayed.    Results from last 7 days  Lab Units 06/13/18  0140   GRAM STAIN RESULT  Rare (1+) WBCs seen  Rare (1+) Epithelial cells per low power field  Mixed bacterial morphotypes seen on Gram Stain                 Results from last 7 days  Lab Units 06/13/18  0037 06/09/18  2121 06/09/18 2012   PH, ARTERIAL pH units 7.430 7.428 7.450   PCO2, ARTERIAL mm Hg 45.5* 59.0* 55.8*   PO2 ART mm Hg 60.0* 262.9* 49.9*   MODALITY  Adult Vent Adult Vent TTube   O2 SATURATION CALC % 91.0* 99.9* 85.5*         ASSESSMENT:   Sudden worsening of acute respiratory failure, requiring increase in FiO2 overnight  Abundant respiratory secretions  Parainfluenza bronchitis  LLL atelectasis/ ? pna  Metabolic encephalopathy   H/o TBI s/p trach - capped usually   Dysphagia s/p PEG   Chronic ruiz - replaced       PLAN:  Much better.  Has tolerated spontaneous ventilation mode for 24 hours now.  Try T piece today.  May moved to telemetry later today if can be suctioned every 4 hours or  longer intervals between suctioning.        German Pandya MD  6/14/2018

## 2018-06-14 NOTE — PLAN OF CARE
Problem: Patient Care Overview  Goal: Plan of Care Review  Outcome: Ongoing (interventions implemented as appropriate)   06/14/18 8308   Coping/Psychosocial   Plan of Care Reviewed With patient;mother;father   OTHER   Outcome Summary Pt remains in CCU. Pt now on T-piece, 10L 30% FiO2, sats remaining > 90%. Pt continues to require frequent suctioning. Positive sputum culture- MD Pandya notified, see orders. UOP adequate. VSS. Will continue to monitor.    Plan of Care Review   Progress improving       Problem: Fall Risk (Adult)  Goal: Absence of Fall  Outcome: Ongoing (interventions implemented as appropriate)      Problem: Skin Injury Risk (Adult)  Goal: Skin Health and Integrity  Outcome: Ongoing (interventions implemented as appropriate)      Problem: Nutrition, Enteral (Adult)  Goal: Signs and Symptoms of Listed Potential Problems Will be Absent, Minimized or Managed (Nutrition, Enteral)  Outcome: Ongoing (interventions implemented as appropriate)      Problem: Breathing Pattern Ineffective (Adult)  Goal: Effective Oxygenation/Ventilation  Outcome: Ongoing (interventions implemented as appropriate)      Problem: Pain, Acute (Adult)  Goal: Acceptable Pain Control/Comfort Level  Outcome: Ongoing (interventions implemented as appropriate)

## 2018-06-14 NOTE — PLAN OF CARE
Problem: Ventilation, Mechanical Invasive (Adult)  Intervention: Prevent Airway Displacement/Mechanical Dysfunction   06/14/18 0326   Prevent Airway Displacement/Mechanical Dysfunction   Airway Safety Measures manual resuscitator/mask/valve in room;suction at bedside

## 2018-06-15 VITALS
WEIGHT: 177.69 LBS | TEMPERATURE: 97.3 F | DIASTOLIC BLOOD PRESSURE: 92 MMHG | HEART RATE: 78 BPM | RESPIRATION RATE: 20 BRPM | SYSTOLIC BLOOD PRESSURE: 154 MMHG | OXYGEN SATURATION: 91 % | HEIGHT: 72 IN | BODY MASS INDEX: 24.07 KG/M2

## 2018-06-15 LAB
ANION GAP SERPL CALCULATED.3IONS-SCNC: 9.5 MMOL/L
BASOPHILS # BLD AUTO: 0.01 10*3/MM3 (ref 0–0.2)
BASOPHILS NFR BLD AUTO: 0.2 % (ref 0–1.5)
BUN BLD-MCNC: 10 MG/DL (ref 6–20)
BUN/CREAT SERPL: 23.8 (ref 7–25)
CALCIUM SPEC-SCNC: 8.5 MG/DL (ref 8.6–10.5)
CHLORIDE SERPL-SCNC: 99 MMOL/L (ref 98–107)
CO2 SERPL-SCNC: 31.5 MMOL/L (ref 22–29)
CREAT BLD-MCNC: 0.42 MG/DL (ref 0.76–1.27)
DEPRECATED RDW RBC AUTO: 48.4 FL (ref 37–54)
EOSINOPHIL # BLD AUTO: 0.15 10*3/MM3 (ref 0–0.7)
EOSINOPHIL NFR BLD AUTO: 2.4 % (ref 0.3–6.2)
ERYTHROCYTE [DISTWIDTH] IN BLOOD BY AUTOMATED COUNT: 13.7 % (ref 11.5–14.5)
GFR SERPL CREATININE-BSD FRML MDRD: >150 ML/MIN/1.73
GLUCOSE BLD-MCNC: 109 MG/DL (ref 65–99)
GLUCOSE BLDC GLUCOMTR-MCNC: 104 MG/DL (ref 70–130)
GLUCOSE BLDC GLUCOMTR-MCNC: 105 MG/DL (ref 70–130)
GLUCOSE BLDC GLUCOMTR-MCNC: 112 MG/DL (ref 70–130)
HCT VFR BLD AUTO: 38.3 % (ref 40.4–52.2)
HGB BLD-MCNC: 12.2 G/DL (ref 13.7–17.6)
IMM GRANULOCYTES # BLD: 0.02 10*3/MM3 (ref 0–0.03)
IMM GRANULOCYTES NFR BLD: 0.3 % (ref 0–0.5)
LYMPHOCYTES # BLD AUTO: 1.62 10*3/MM3 (ref 0.9–4.8)
LYMPHOCYTES NFR BLD AUTO: 26.4 % (ref 19.6–45.3)
MCH RBC QN AUTO: 31.4 PG (ref 27–32.7)
MCHC RBC AUTO-ENTMCNC: 31.9 G/DL (ref 32.6–36.4)
MCV RBC AUTO: 98.5 FL (ref 79.8–96.2)
MONOCYTES # BLD AUTO: 0.49 10*3/MM3 (ref 0.2–1.2)
MONOCYTES NFR BLD AUTO: 8 % (ref 5–12)
NEUTROPHILS # BLD AUTO: 3.84 10*3/MM3 (ref 1.9–8.1)
NEUTROPHILS NFR BLD AUTO: 62.7 % (ref 42.7–76)
PLATELET # BLD AUTO: 201 10*3/MM3 (ref 140–500)
PMV BLD AUTO: 9.1 FL (ref 6–12)
POTASSIUM BLD-SCNC: 3.4 MMOL/L (ref 3.5–5.2)
PROCALCITONIN SERPL-MCNC: 0.04 NG/ML (ref 0.1–0.25)
RBC # BLD AUTO: 3.89 10*6/MM3 (ref 4.6–6)
SODIUM BLD-SCNC: 140 MMOL/L (ref 136–145)
WBC NRBC COR # BLD: 6.13 10*3/MM3 (ref 4.5–10.7)

## 2018-06-15 PROCEDURE — 94668 MNPJ CHEST WALL SBSQ: CPT

## 2018-06-15 PROCEDURE — 85025 COMPLETE CBC W/AUTO DIFF WBC: CPT | Performed by: INTERNAL MEDICINE

## 2018-06-15 PROCEDURE — 82962 GLUCOSE BLOOD TEST: CPT

## 2018-06-15 PROCEDURE — 25010000002 ENOXAPARIN PER 10 MG: Performed by: INTERNAL MEDICINE

## 2018-06-15 PROCEDURE — 94799 UNLISTED PULMONARY SVC/PX: CPT

## 2018-06-15 PROCEDURE — 80048 BASIC METABOLIC PNL TOTAL CA: CPT | Performed by: INTERNAL MEDICINE

## 2018-06-15 PROCEDURE — 84145 PROCALCITONIN (PCT): CPT | Performed by: INTERNAL MEDICINE

## 2018-06-15 RX ORDER — BACLOFEN 20 MG/1
10 TABLET ORAL 3 TIMES DAILY PRN
Qty: 90 TABLET | Refills: 3 | Status: SHIPPED | OUTPATIENT
Start: 2018-06-15

## 2018-06-15 RX ADMIN — LEVOTHYROXINE SODIUM 50 MCG: 50 TABLET ORAL at 09:13

## 2018-06-15 RX ADMIN — SODIUM CHLORIDE 4 ML: 7 NEBU SOLN,3 % NEBU at 15:53

## 2018-06-15 RX ADMIN — POTASSIUM CHLORIDE 40 MEQ: 1.5 POWDER, FOR SOLUTION ORAL at 05:13

## 2018-06-15 RX ADMIN — IPRATROPIUM BROMIDE AND ALBUTEROL SULFATE 3 ML: .5; 3 SOLUTION RESPIRATORY (INHALATION) at 08:21

## 2018-06-15 RX ADMIN — IPRATROPIUM BROMIDE AND ALBUTEROL SULFATE 3 ML: .5; 3 SOLUTION RESPIRATORY (INHALATION) at 15:53

## 2018-06-15 RX ADMIN — POTASSIUM CHLORIDE 40 MEQ: 1.5 POWDER, FOR SOLUTION ORAL at 09:12

## 2018-06-15 RX ADMIN — SODIUM CHLORIDE 4 ML: 7 NEBU SOLN,3 % NEBU at 08:21

## 2018-06-15 RX ADMIN — ASPIRIN 325 MG: 325 TABLET ORAL at 09:12

## 2018-06-15 RX ADMIN — LEVETIRACETAM 750 MG: 100 SOLUTION ORAL at 05:11

## 2018-06-15 RX ADMIN — ENOXAPARIN SODIUM 40 MG: 100 INJECTION SUBCUTANEOUS at 05:11

## 2018-06-15 RX ADMIN — CARBAMAZEPINE 100 MG: 100 SUSPENSION ORAL at 09:12

## 2018-06-15 RX ADMIN — BACLOFEN 5 MG: 10 TABLET ORAL at 09:12

## 2018-06-15 RX ADMIN — RISPERIDONE 1 MG: 1 TABLET, FILM COATED ORAL at 09:12

## 2018-06-15 NOTE — PLAN OF CARE
Problem: Patient Care Overview  Goal: Plan of Care Review   06/15/18 3767   Coping/Psychosocial   Plan of Care Reviewed With patient   OTHER   Outcome Summary patient remained on t-piece overnight. mildy apneic overnight, frequent suctioning- fio2 to tpiece fluxuating from 35 to 50. potassium replaced this am. VSS will continue to monitor.    Plan of Care Review   Progress no change

## 2018-06-15 NOTE — DISCHARGE SUMMARY
Patient Identification:  Name: Brayden Lynn  Age: 57 y.o.  Sex: male  :  1960  MRN: [id number]  Primary Care Physician: Guillermo Do MD    Admit date: 2018  Discharge date and time:  Alannah 15, 2018  Discharged Condition: good    Discharge Diagnoses:   Acute on chronic respiratory failure  Colonization of the airways with Pseudomonas  Acute viral bronchopneumonia due to parainfluenza virus  Left lower lobe infiltrate, probable pneumonia  Metabolic encephalopathy   H/o TBI s/p trach - capped usually   Dysphagia s/p PEG   Chronic ruiz - replaced       Hospital Course: Brayden Lynn presented to [facility] with severe respiratory failure.  The patient has a history of tracheostomy, usually a size 6 metal Demond tracheostomy.  His parents are very skilled at providing care for him at home.  He has had tracheostomy for 12 years.     He has done quite well.  He was weaned off of mechanical ventilation and his Shiley plastic size 6 cuffed tracheostomy was exchanged today by respiratory therapy to a size 6 Demond tracheostomy.    He does have Pseudomonas growing from his sputum, which I believe is colonizing his airways.  There is currently no evidence of pneumonia.  He does have some mild atelectasis of the left lower lobe on x-ray, however he received proper treatment for pneumonia for 7 days, and he is not spiking fevers and he is not showing any signs or symptoms of active pneumonia.    His family would like to take him home today with home health.  They have the necessary equipment to provide oxygen and suctioning.  They have been notified to come back to the emergency room in case the patient becomes ill.  They expressed understanding      Consults:   IP CONSULT TO PULMONOLOGY  IP CONSULT TO NEUROLOGY  IP CONSULT TO NUTRITION SERVICES  IP CONSULT TO GASTROENTEROLOGY  IP CONSULT TO NUTRITION SERVICES    Significant Diagnostic Studies:   Imaging Results (most recent)      Procedure Component Value Units Date/Time    XR Chest 1 View [[id number]] Collected:  06/11/18 0709     Updated:  06/14/18 0507    Narrative:       PORTABLE CHEST X-RAY     CLINICAL HISTORY: Respiratory failure.     COMPARISON: 06/03/2018.     FINDINGS: Portable AP view of the chest was obtained with overlying  monitor leads in place. Tracheostomy remains in place. Lungs are fairly  well inflated. No active airspace disease. Resolving left effusion.  Heart size and vascularity are normal.             Impression:       Resolving left effusion.        This report was finalized on 6/14/2018 5:04 AM by Tio White M.D.       XR Chest 1 View [[id number]] Collected:  06/13/18 0043     Updated:  06/13/18 0043    Narrative:       X-RAY CHEST 1 VIEW.     HISTORY: Increased oxygen requirement.     COMPARISON: 6/11/2018.     FINDINGS:  Cardiomediastinal silhouette is within normal limits. Tracheostomy tube  is in position.     Worsening of the opacity at the left lung base concerning for  infiltrate. There also appears to be a small left effusion.              Impression:       Worsening opacity at the left lung base concerning for infiltrate and  small left effusion. Follow-up to resolution is recommended.           XR Abdomen KUB [[id number]] Collected:  06/05/18 0714     Updated:  06/06/18 0159    Narrative:       SINGLE VIEW ABDOMEN/KUB     HISTORY:Colonic ileus; J12.9-Viral pneumonia, unspecified; J96.01-Acute  respiratory failure with hypoxia; J96.02-Acute respiratory failure with  hypercapnia.     COMPARISON: 06/03/2018.     FINDINGS: Portable supine view of the abdomen obtained. 2 images were  submitted.      There is diminishing bowel gas and diminishing bowel distention likely  related to improving ileus or enteritis. Percutaneous gastrostomy tube  is present over the left upper quadrant..  Arthritic changes are present  in the hips, much greater on the right side. There are postsurgical  changes in the left hip..   Small calcifications are again noted over the  left renal shadow, likely nephrolithiasis.               Impression:       1. Resolving ileus/enteritis. No significant distention on today's  study.  2. Probable left nephrolithiasis..         This report was finalized on 6/6/2018 1:55 AM by Tio White M.D.       XR Chest 1 View [[id number]] Collected:  06/03/18 0443     Updated:  06/03/18 2154    Narrative:       X-RAY CHEST 1 VIEW.     HISTORY: Colonic ileus.     COMPARISON: No prior studies for comparison.     FINDINGS:  Cardiomediastinal silhouette is within normal limits. A tracheostomy  tube is in position.     There appears to be a small effusion on the left.              Impression:       Overall no significant change.         This report was finalized on 6/3/2018 9:51 PM by Dr. Simi Freedman M.D.       XR Abdomen KUB [[id number]] Collected:  06/03/18 0511     Updated:  06/03/18 2134    Narrative:       X-RAY ABDOMEN ONE VIEW KUB.     HISTORY:  Colonic ileus.     COMPARISON:  No prior studies for comparison.     FINDINGS:   Marked distention of the colon is noted up to 8.3 cm. No abnormal  calcifications are seen.     No free air in the abdomen. Hay catheter is in position.       Impression:       Persistent abnormal distention of the colonic bowel loops. No  significant improvement.  If symptoms persist or patient deteriorates, CT scan with contrast may  be performed.         This report was finalized on 6/3/2018 9:31 PM by Dr. Simi Freedman M.D.       CT Abdomen Pelvis Without Contrast [[id number]] Collected:  06/02/18 1309     Updated:  06/02/18 1349    Narrative:       CT ABDOMEN PELVIS WITHOUT CONTRAST-     CLINICAL: Ileus versus megacolon, traumatic brain injury patient with  abdominal distention.     Radiation dose reduction techniques were utilized, including automated  exposure control and exposure modulation based on body size.     FINDINGS: The stomach is collapsed, the G-tube position is  satisfactory.  The bladder is collapsed, Hay catheter position is satisfactory.     The colon is grossly distended without wall thickening or transition  point. There is gradual tapering to the rectum. The colon is filled with  fluid and a small amount of formed fecal material.     The small bowel is normal in appearance. No free intraperitoneal air or  free fluid identified.     Several small nonobstructing left renal calculi measuring up to 6 mm.  The left kidney is somewhat small in size compared to the right.     Bilateral lower lobe lung consolidation. 2.6 cm hepatic cyst identified.  The gallbladder has either been removed or it is completely collapsed.  No given history of previous cholecystectomy. In the gallbladder fossa  there is a calcific density measuring 1.4 cm, gallstone possible.     The pancreas, spleen and imaging of glands are within normal limits.  Caliber of the aorta is normal.     CONCLUSION:  1. Bilateral lower lobe lung consolidation, possible pneumonia.  2. G-tube and Hay catheter positions are satisfactory.  3. Nonobstructing left renal calculi.  4. Hepatic cysts.  5. Possible gallstone, the gallbladder has either been removed or  completely collapsed, no given history of cholecystectomy.  6. The entire colon is dilated and slowly tapers to the rectum, it is  filled with fluid and a small amount of formed fecal material. No wall  thickening or transition point and the small bowel caliber is normal.     This report was finalized on 6/2/2018 1:46 PM by Dr. Magdi Tse M.D.       XR Abdomen KUB [[id number]] Collected:  06/02/18 1057     Updated:  06/02/18 1108    Narrative:       ONE VIEW PORTABLE ABDOMEN AT 10:39 AM     HISTORY: Abdominal distention.     There is marked gaseous distention of the entire colon with several  segments of the colon measuring up to 10.5 cm in diameter. There is  relatively little definite small bowel distention and the findings are  nonspecific but could  relate to either severe ileus or possibly toxic  megacolon and further clinical correlation is required.     This report was finalized on 6/2/2018 11:05 AM by Dr. Jonathan Clarke M.D.       XR Chest 1 View [[id number]] Collected:  05/29/18 2349     Updated:  05/30/18 1740    Narrative:       X-RAY CHEST 1 VIEW.     HISTORY: Shortness of breath.     COMPARISON: No prior studies for comparison.     FINDINGS:  Borderline cardiomegaly, previously tube is in position.         Pulmonary congestion. Opacity at the left lung base and elevation of the  left hemidiaphragm.              Impression:       Infiltrate of the left lung base cannot be excluded, clinical  correlation is recommended.         This report was finalized on 5/30/2018 10:50 PM by Dr. Simi Freedman M.D.       CT Head Without Contrast [[id number]] Collected:  05/30/18 1358     Updated:  05/30/18 1556    Narrative:       CT HEAD WITHOUT CONTRAST     HISTORY:  Seizure, stroke.     COMPARISON: None.     A noncontrasted CT examination of the brain was performed.     FINDINGS: There is encephalomalacia appreciated involving the frontal  lobes bilaterally anteriorly and superiorly. The etiology is uncertain  and this may be secondary to prior trauma. There is volume loss with  enlargement of the frontal horns bilaterally. No focal area of decreased  attenuation to suggest acute infarction is identified. There is at least  moderate cerebellar atrophy.       Impression:       Bifrontal encephalomalacia. There is no evidence of acute  infarction or hemorrhage. Cerebellar atrophy is noted. This may be  secondary to chronic Dilantin use. Clinical correlation is required.  Further evaluation could be performed with an MRI examination of the  brain as indicated.        Radiation dose reduction techniques were utilized, including automated  exposure control and exposure modulation based on body size.     This report was finalized on 5/30/2018 3:50 PM by Dr. Fuentes  HERMES Gonzalez       CT Chest Without Contrast [[id number]] Collected:  05/30/18 1255     Updated:  05/30/18 1430    Narrative:       CT CHEST WITHOUT CONTRAST-     CLINICAL: Pneumonia, unresolved, complicated, left lower lobe.     COMPARISON: None.     Radiation dose reduction techniques were utilized, including automated  exposure control and exposure modulation based on body size.     FINDINGS: There is a peripheral patchy area of consolidation along the  left lower lobe costophrenic sulcus with a minimal amount of associated  pleural thickening however no gross pleural effusion. There is a  reticular nodular infiltrate demonstrated within the perihilar portions  of the left upper and lower lobes. There is a lesser amount of reticular  nodular infiltrate within the right suprahilar portion of the right  upper lobe. Also at this location there is a small noncalcified  pulmonary nodule measuring 5 mm seen on image 33. There is a similar  such nodule within the suprahilar left upper lobe seen on image #35,  measuring 7 mm. There are areas of peribronchial interstitial thickening  suggesting bronchitis.     There is tracheostomy tube in position. There are likely mildly enlarged  left infrahilar lymph nodes causing some bronchial effacement however no  endobronchial lesion seen. Mild-to-moderate enlargement of the  mediastinal lymph nodes. There is cardiac enlargement, no pericardial or  esophageal abnormality identified.     CONCLUSION:  1. Left lower lobe consolidation along the costophrenic sulcus with some  associated pleural thickening. Some fullness in the left infrahilar  region suggests probable enlarged lymph nodes with some effacement of  the bronchi. Left-sided perihilar upper and lower lobe reticular nodular  infiltrates.  2. Subtle right suprahilar upper lobe reticular nodular infiltrate.  3. 5 mm right upper lobe noncalcified pulmonary nodule and 7 mm left  upper lobe noncalcified pulmonary nodule.  Recommend at least 4-6 month  follow-up CT chest.  4. Tracheostomy tube in position. There are enlarged mediastinal lymph  nodes.     This report was finalized on 5/30/2018 2:27 PM by Dr. Magdi Tse M.D.                 Results from last 7 days  Lab Units 06/13/18  0140   RESPCX  Heavy growth (4+) Pseudomonas species*  No Normal Respiratory Yanet*   GRAM STAIN RESULT  Rare (1+) WBCs seen  Rare (1+) Epithelial cells per low power field  Mixed bacterial morphotypes seen on Gram Stain     TEST  RESULTS PENDING AT DISCHARGE   Order Current Status    AFB Culture - Lavage, Lung, Left Lower Lobe Preliminary result    Fungus Culture - Lavage, Lung, Left Lower Lobe Preliminary result    Respiratory Culture - Sputum, ET Suction Preliminary result          Discharge Exam:  Alert, Orientation is difficult to assess because the patient is nonverbal.  Size 6 metal Demond tracheostomy with oxygen collar is in place.  RRR, no m/r/g, no edema  Some rhonchi on the left side.  Right side sounds clear.  No wheezing, nonlabored  No clubbing or cyanosis  He has chronic deformities and contractures of his upper extremities bilaterally     Disposition:  Home    Patient Instructions:      Discharge Medications      Changes to Medications      Instructions Start Date   baclofen 20 MG tablet  Commonly known as:  LIORESAL  What changed:  · how much to take  · when to take this  · reasons to take this   10 mg, Per G Tube, 3 Times Daily PRN         Continue These Medications      Instructions Start Date   ABILIFY 20 MG tablet  Generic drug:  ARIPiprazole   20 mg, Per G Tube, Daily      aspirin 325 MG tablet   325 mg, Per G Tube, Daily      carBAMazepine 100 MG/5ML suspension  Commonly known as:  TEGretol   Per G Tube, 2 Times Daily      digoxin 125 MCG tablet  Commonly known as:  LANOXIN   125 mcg, Per G Tube, Daily Digoxin      levETIRAcetam 750 MG tablet  Commonly known as:  KEPPRA   750 mg, Per G Tube, 2 Times Daily       levothyroxine 50 MCG tablet  Commonly known as:  SYNTHROID, LEVOTHROID   50 mcg, Per G Tube, Daily         Stop These Medications    bumetanide 0.5 MG tablet  Commonly known as:  BUMEX     cetirizine 10 MG tablet  Commonly known as:  zyrTEC     diltiaZEM 30 MG tablet  Commonly known as:  CARDIZEM     lansoprazole 30 MG capsule  Commonly known as:  PREVACID             Your medication list      CHANGE how you take these medications      Instructions Last Dose Given Next Dose Due   baclofen 20 MG tablet  Commonly known as:  LIORESAL  What changed:  · how much to take  · when to take this  · reasons to take this      0.5 tablets by Per G Tube route 3 (Three) Times a Day As Needed for Muscle Spasms.          CONTINUE taking these medications      Instructions Last Dose Given Next Dose Due   ABILIFY 20 MG tablet  Generic drug:  ARIPiprazole      20 mg by Per G Tube route Daily.       aspirin 325 MG tablet      325 mg by Per G Tube route Daily.       carBAMazepine 100 MG/5ML suspension  Commonly known as:  TEGretol      by Per G Tube route 2 (Two) Times a Day.       digoxin 125 MCG tablet  Commonly known as:  LANOXIN      125 mcg by Per G Tube route Daily.       levETIRAcetam 750 MG tablet  Commonly known as:  KEPPRA      750 mg by Per G Tube route 2 (Two) Times a Day.       levothyroxine 50 MCG tablet  Commonly known as:  SYNTHROID, LEVOTHROID      50 mcg by Per G Tube route Daily.          STOP taking these medications    bumetanide 0.5 MG tablet  Commonly known as:  BUMEX        cetirizine 10 MG tablet  Commonly known as:  zyrTEC        diltiaZEM 30 MG tablet  Commonly known as:  CARDIZEM        lansoprazole 30 MG capsule  Commonly known as:  PREVACID              Where to Get Your Medications      These medications were sent to [id number] - [place], [place] - [address] [facility] - [phone]  - [phone]   [address] [place] [address]    Hours:  24-hours Phone:  [phone]    · baclofen 20 MG tablet             Medication Reconciliation: Please see electronically completed Med Rec.    Total time spent discharging patient including evaluation, medication reconciliation, arranging follow up, and post hospitalization instructions and education total time exceeds 30 minutes.    Signed:  German Pandya MD  6/15/2018  12:41 PM

## 2018-06-15 NOTE — PROGRESS NOTES
[facility]    Physicians Statement of Medical Necessity for Ambulance Transportation    It is medically necessary for:    Patient Name: Brayden Lynn    Insurance Information:      To be transported by ambulance:    From (if nursing facility, specify level of care: skilled, USP, etc): [facility]     To (specify level of care if nursing facility):   [address] [facility] [address]         Date of Service: 6/15/18    For dialysis patients state date dialysis began:    Diagnosis:     Past Medical/Surgical History:  Past Medical History:   Diagnosis Date   • Gastrostomy tube in place    • TBI (traumatic brain injury)    • Tracheostomy dependent       Past Surgical History:   Procedure Laterality Date   • BRONCHOSCOPY N/A 6/1/2018    Procedure: BRONCHOSCOPY AT BEDSIDE WITH LAVAGE;  Surgeon: Juan Blankenship MD;  Location: [facility] ENDOSCOPY;  Service: Pulmonary   • HIP SURGERY     • LEG SURGERY          Current Objective Medical Evidence(including physical exam finding to support reason for limitations):    Leg contractures  Bedridden  Requires oxygen  Unable to sit at 90 degree angle    Other: Trach, TBI    Physician Signature:           (RN,NP,PA,CAN, Discharge Planner)   Date/Time: 6/15/18  13:48       Printed Name:    JC Murillo RN,CCP_________________________________    [facility]   Phone: [phone] Phone: [phone] Phone: [phone]   Fax: [phone] Fax: [phone] Fax: [phone]

## 2018-06-15 NOTE — PROGRESS NOTES
Continued Stay Note  [facility]     Patient Name: Brayden Lynn  MRN: [id number]  Today's Date: 6/15/2018    Admit Date: 5/29/2018          Discharge Plan     Row Name 06/15/18 1437       Plan    Plan Home with parents and [facility]    Plan Comments Notified that pt will be discharged home today.  [facility] arranged for transport home.  Set for no later than 8pm but they will call sooner if they can work it in.  Family is in agreement with plan.  CCP contacted Carol with [facility] to alert them of discharge.  She will notify the office.                Discharge Codes    No documentation.       Expected Discharge Date and Time     Expected Discharge Date Expected Discharge Time    Mike 15, 2018             Sarita Murillo RN

## 2018-06-16 LAB
BACTERIA SPEC RESP CULT: ABNORMAL
GRAM STN SPEC: ABNORMAL

## 2018-06-18 NOTE — PROGRESS NOTES
Case Management Discharge Note    Final Note: Home via ambulance with [facility] and parents    Destination     No service coordination in this encounter.      Durable Medical Equipment     No service coordination in this encounter.      Dialysis/Infusion     No service coordination in this encounter.      Home Medical Care     Service Request Status Selected Specialties Address Phone Number Fax Number    [facility] Selected Home Health Services [address] [place] [id number] [phone]        [facility] 5/30/2018 1713    Patient was current with [facility] prior to admission                 Social Care     No service coordination in this encounter.        [facility].  Set for no later than 8pm but they will call sooner if they can work it in.  Family is in agreement with plan.)    Final Discharge Disposition Code: 06 - home with home health care

## 2018-06-29 LAB — FUNGUS WND CULT: NORMAL

## 2018-07-13 LAB
MYCOBACTERIUM SPEC CULT: NORMAL
NIGHT BLUE STAIN TISS: NORMAL

## 2023-06-23 ENCOUNTER — INPATIENT HOSPITAL (OUTPATIENT)
Dept: URBAN - METROPOLITAN AREA HOSPITAL 107 | Facility: HOSPITAL | Age: 63
End: 2023-06-23

## 2023-06-23 DIAGNOSIS — D64.9 ANEMIA, UNSPECIFIED: ICD-10-CM

## 2023-06-23 PROCEDURE — 99221 1ST HOSP IP/OBS SF/LOW 40: CPT | Performed by: NURSE PRACTITIONER

## 2023-06-24 ENCOUNTER — INPATIENT HOSPITAL (OUTPATIENT)
Dept: URBAN - METROPOLITAN AREA HOSPITAL 107 | Facility: HOSPITAL | Age: 63
End: 2023-06-24

## 2023-06-24 DIAGNOSIS — K62.5 HEMORRHAGE OF ANUS AND RECTUM: ICD-10-CM

## 2023-06-24 DIAGNOSIS — D64.9 ANEMIA, UNSPECIFIED: ICD-10-CM

## 2023-06-24 DIAGNOSIS — A41.9 SEPSIS, UNSPECIFIED ORGANISM: ICD-10-CM

## 2023-06-24 PROCEDURE — 99232 SBSQ HOSP IP/OBS MODERATE 35: CPT | Performed by: INTERNAL MEDICINE

## 2023-06-26 ENCOUNTER — INPATIENT HOSPITAL (OUTPATIENT)
Dept: URBAN - METROPOLITAN AREA HOSPITAL 107 | Facility: HOSPITAL | Age: 63
End: 2023-06-26

## 2023-06-26 DIAGNOSIS — K92.1 MELENA: ICD-10-CM

## 2023-06-26 PROCEDURE — 99233 SBSQ HOSP IP/OBS HIGH 50: CPT | Performed by: PHYSICIAN ASSISTANT

## 2023-06-27 ENCOUNTER — INPATIENT HOSPITAL (OUTPATIENT)
Dept: URBAN - METROPOLITAN AREA HOSPITAL 107 | Facility: HOSPITAL | Age: 63
End: 2023-06-27

## 2023-06-27 DIAGNOSIS — K92.0 HEMATEMESIS: ICD-10-CM

## 2023-06-27 PROCEDURE — 99233 SBSQ HOSP IP/OBS HIGH 50: CPT | Performed by: PHYSICIAN ASSISTANT

## (undated) DEVICE — LN SMPL O2 NASL/ORL SMART/CAPNOLINE PLS A/

## (undated) DEVICE — ADAPT SWVL FIBROPTIC BRONCH

## (undated) DEVICE — SINGLE USE BIOPSY VALVE MAJ-210: Brand: SINGLE USE BIOPSY VALVE (STERILE)

## (undated) DEVICE — MSK AIRWY LARYNG LMA UNIQUE STD PK SZ4

## (undated) DEVICE — SINGLE USE SUCTION VALVE MAJ-209: Brand: SINGLE USE SUCTION VALVE (STERILE)

## (undated) DEVICE — TUBING, SUCTION, 1/4" X 10', STRAIGHT: Brand: MEDLINE

## (undated) DEVICE — TRAP,MUCUS SPECIMEN, 80CC: Brand: MEDLINE

## (undated) DEVICE — VITAL SIGNS™ JACKSON-REES CIRCUITS: Brand: VITAL SIGNS™